# Patient Record
Sex: MALE | Race: WHITE | NOT HISPANIC OR LATINO | Employment: OTHER | URBAN - METROPOLITAN AREA
[De-identification: names, ages, dates, MRNs, and addresses within clinical notes are randomized per-mention and may not be internally consistent; named-entity substitution may affect disease eponyms.]

---

## 2018-08-02 ENCOUNTER — OFFICE VISIT (OUTPATIENT)
Dept: FAMILY MEDICINE | Facility: CLINIC | Age: 63
End: 2018-08-02
Payer: MEDICARE

## 2018-08-02 ENCOUNTER — TELEPHONE (OUTPATIENT)
Dept: FAMILY MEDICINE | Facility: CLINIC | Age: 63
End: 2018-08-02

## 2018-08-02 VITALS
OXYGEN SATURATION: 97 % | HEIGHT: 64 IN | DIASTOLIC BLOOD PRESSURE: 70 MMHG | WEIGHT: 134 LBS | TEMPERATURE: 98.6 F | BODY MASS INDEX: 22.88 KG/M2 | SYSTOLIC BLOOD PRESSURE: 156 MMHG | HEART RATE: 82 BPM

## 2018-08-02 DIAGNOSIS — E78.5 HYPERLIPIDEMIA, UNSPECIFIED HYPERLIPIDEMIA TYPE: ICD-10-CM

## 2018-08-02 DIAGNOSIS — I10 ESSENTIAL HYPERTENSION: ICD-10-CM

## 2018-08-02 DIAGNOSIS — Z79.4 TYPE 2 DIABETES MELLITUS WITH COMPLICATION, WITH LONG-TERM CURRENT USE OF INSULIN (H): Primary | ICD-10-CM

## 2018-08-02 DIAGNOSIS — M54.12 CERVICAL RADICULOPATHY: ICD-10-CM

## 2018-08-02 DIAGNOSIS — Z12.11 SCREEN FOR COLON CANCER: ICD-10-CM

## 2018-08-02 DIAGNOSIS — Z11.4 SCREENING FOR HIV (HUMAN IMMUNODEFICIENCY VIRUS): ICD-10-CM

## 2018-08-02 DIAGNOSIS — E11.8 TYPE 2 DIABETES MELLITUS WITH COMPLICATION, WITH LONG-TERM CURRENT USE OF INSULIN (H): Primary | ICD-10-CM

## 2018-08-02 DIAGNOSIS — S06.9X9D TRAUMATIC BRAIN INJURY WITH LOSS OF CONSCIOUSNESS, SUBSEQUENT ENCOUNTER: ICD-10-CM

## 2018-08-02 DIAGNOSIS — F10.21 ALCOHOLISM IN REMISSION (H): ICD-10-CM

## 2018-08-02 DIAGNOSIS — Z11.59 NEED FOR HEPATITIS C SCREENING TEST: ICD-10-CM

## 2018-08-02 DIAGNOSIS — Z59.00 HOMELESS: ICD-10-CM

## 2018-08-02 DIAGNOSIS — H91.93 BILATERAL HEARING LOSS, UNSPECIFIED HEARING LOSS TYPE: ICD-10-CM

## 2018-08-02 LAB
ALBUMIN SERPL-MCNC: 3.6 G/DL (ref 3.4–5)
ALP SERPL-CCNC: 61 U/L (ref 40–150)
ALT SERPL W P-5'-P-CCNC: 20 U/L (ref 0–70)
AMPHETAMINES UR QL: NOT DETECTED NG/ML
ANION GAP SERPL CALCULATED.3IONS-SCNC: 4 MMOL/L (ref 3–14)
AST SERPL W P-5'-P-CCNC: 18 U/L (ref 0–45)
BARBITURATES UR QL SCN: NOT DETECTED NG/ML
BENZODIAZ UR QL SCN: NOT DETECTED NG/ML
BILIRUB SERPL-MCNC: 0.2 MG/DL (ref 0.2–1.3)
BUN SERPL-MCNC: 15 MG/DL (ref 7–30)
BUPRENORPHINE UR QL: NOT DETECTED NG/ML
CALCIUM SERPL-MCNC: 9.2 MG/DL (ref 8.5–10.1)
CANNABINOIDS UR QL: NOT DETECTED NG/ML
CHLORIDE SERPL-SCNC: 106 MMOL/L (ref 94–109)
CHOLEST SERPL-MCNC: 263 MG/DL
CO2 SERPL-SCNC: 30 MMOL/L (ref 20–32)
COCAINE UR QL SCN: NOT DETECTED NG/ML
CREAT SERPL-MCNC: 1 MG/DL (ref 0.66–1.25)
CREAT UR-MCNC: 49 MG/DL
D-METHAMPHET UR QL: NOT DETECTED NG/ML
ERYTHROCYTE [DISTWIDTH] IN BLOOD BY AUTOMATED COUNT: 13.4 % (ref 10–15)
GFR SERPL CREATININE-BSD FRML MDRD: 75 ML/MIN/1.7M2
GLUCOSE SERPL-MCNC: 120 MG/DL (ref 70–99)
HBA1C MFR BLD: 6.6 % (ref 0–5.6)
HCT VFR BLD AUTO: 42.5 % (ref 40–53)
HCV AB SERPL QL IA: ABNORMAL
HDLC SERPL-MCNC: 53 MG/DL
HGB BLD-MCNC: 14.3 G/DL (ref 13.3–17.7)
HIV 1+2 AB+HIV1 P24 AG SERPL QL IA: NONREACTIVE
LDLC SERPL CALC-MCNC: 180 MG/DL
MCH RBC QN AUTO: 30.2 PG (ref 26.5–33)
MCHC RBC AUTO-ENTMCNC: 33.6 G/DL (ref 31.5–36.5)
MCV RBC AUTO: 90 FL (ref 78–100)
METHADONE UR QL SCN: NOT DETECTED NG/ML
MICROALBUMIN UR-MCNC: 5 MG/L
MICROALBUMIN/CREAT UR: 10.57 MG/G CR (ref 0–17)
NONHDLC SERPL-MCNC: 210 MG/DL
OPIATES UR QL SCN: NOT DETECTED NG/ML
OXYCODONE UR QL SCN: NOT DETECTED NG/ML
PCP UR QL SCN: NOT DETECTED NG/ML
PLATELET # BLD AUTO: 267 10E9/L (ref 150–450)
POTASSIUM SERPL-SCNC: 4.2 MMOL/L (ref 3.4–5.3)
PROPOXYPH UR QL: NOT DETECTED NG/ML
PROT SERPL-MCNC: 7.3 G/DL (ref 6.8–8.8)
RBC # BLD AUTO: 4.74 10E12/L (ref 4.4–5.9)
SODIUM SERPL-SCNC: 140 MMOL/L (ref 133–144)
TRICYCLICS UR QL SCN: NOT DETECTED NG/ML
TRIGL SERPL-MCNC: 150 MG/DL
WBC # BLD AUTO: 5.8 10E9/L (ref 4–11)

## 2018-08-02 PROCEDURE — 87389 HIV-1 AG W/HIV-1&-2 AB AG IA: CPT | Performed by: NURSE PRACTITIONER

## 2018-08-02 PROCEDURE — 99203 OFFICE O/P NEW LOW 30 MIN: CPT | Performed by: NURSE PRACTITIONER

## 2018-08-02 PROCEDURE — 36415 COLL VENOUS BLD VENIPUNCTURE: CPT | Performed by: NURSE PRACTITIONER

## 2018-08-02 PROCEDURE — 80306 DRUG TEST PRSMV INSTRMNT: CPT | Performed by: NURSE PRACTITIONER

## 2018-08-02 PROCEDURE — 80053 COMPREHEN METABOLIC PANEL: CPT | Performed by: NURSE PRACTITIONER

## 2018-08-02 PROCEDURE — 85027 COMPLETE CBC AUTOMATED: CPT | Performed by: NURSE PRACTITIONER

## 2018-08-02 PROCEDURE — 82043 UR ALBUMIN QUANTITATIVE: CPT | Performed by: NURSE PRACTITIONER

## 2018-08-02 PROCEDURE — 80061 LIPID PANEL: CPT | Performed by: NURSE PRACTITIONER

## 2018-08-02 PROCEDURE — 86803 HEPATITIS C AB TEST: CPT | Performed by: NURSE PRACTITIONER

## 2018-08-02 PROCEDURE — 83036 HEMOGLOBIN GLYCOSYLATED A1C: CPT | Performed by: NURSE PRACTITIONER

## 2018-08-02 RX ORDER — METHYLPHENIDATE HYDROCHLORIDE 10 MG/1
10 TABLET ORAL
COMMUNITY
Start: 2018-07-06

## 2018-08-02 RX ORDER — AMLODIPINE BESYLATE 10 MG/1
10 TABLET ORAL
COMMUNITY
Start: 2012-06-26 | End: 2018-08-02

## 2018-08-02 RX ORDER — ASPIRIN 81 MG/1
81 TABLET, CHEWABLE ORAL DAILY
Qty: 36 TABLET | Refills: 0 | Status: SHIPPED | OUTPATIENT
Start: 2018-08-02 | End: 2018-08-02

## 2018-08-02 RX ORDER — AMLODIPINE BESYLATE 10 MG/1
10 TABLET ORAL DAILY
Qty: 30 TABLET | Refills: 1 | Status: SHIPPED | OUTPATIENT
Start: 2018-08-02 | End: 2020-09-25

## 2018-08-02 RX ORDER — GABAPENTIN 100 MG/1
CAPSULE ORAL
Qty: 180 CAPSULE | Refills: 0 | Status: SHIPPED | OUTPATIENT
Start: 2018-08-02 | End: 2018-08-02

## 2018-08-02 RX ORDER — GABAPENTIN 300 MG/1
CAPSULE ORAL
COMMUNITY
Start: 2018-05-03 | End: 2019-02-28

## 2018-08-02 RX ORDER — GABAPENTIN 100 MG/1
CAPSULE ORAL
Qty: 180 CAPSULE | Refills: 0 | Status: SHIPPED | OUTPATIENT
Start: 2018-08-02 | End: 2019-02-28 | Stop reason: DRUGHIGH

## 2018-08-02 RX ORDER — ASPIRIN 81 MG/1
81 TABLET, CHEWABLE ORAL
COMMUNITY
Start: 2017-08-17 | End: 2018-08-02

## 2018-08-02 RX ORDER — ESZOPICLONE 2 MG/1
2 TABLET, FILM COATED ORAL
COMMUNITY
Start: 2018-04-27 | End: 2019-02-26

## 2018-08-02 RX ORDER — ASPIRIN 81 MG/1
81 TABLET, CHEWABLE ORAL DAILY
Qty: 36 TABLET | Refills: 0 | Status: SHIPPED | OUTPATIENT
Start: 2018-08-02 | End: 2018-08-29

## 2018-08-02 RX ORDER — AMLODIPINE BESYLATE 10 MG/1
10 TABLET ORAL DAILY
Qty: 30 TABLET | Refills: 1 | Status: SHIPPED | OUTPATIENT
Start: 2018-08-02 | End: 2018-08-02

## 2018-08-02 SDOH — ECONOMIC STABILITY - HOUSING INSECURITY: HOMELESSNESS UNSPECIFIED: Z59.00

## 2018-08-02 ASSESSMENT — PAIN SCALES - GENERAL: PAINLEVEL: NO PAIN (0)

## 2018-08-02 NOTE — MR AVS SNAPSHOT
After Visit Summary   8/2/2018    Faheem Jeff    MRN: [id number]           Patient Information     Date Of Birth          1955        Visit Information        Provider Department      8/2/2018 8:40 AM Lauren Cole APRN CNP [facility]        Today's Diagnoses     Type 2 diabetes mellitus with complication, with long-term current use of insulin (H)    -  1    Alcoholism in remission (H)        Hyperlipidemia, unspecified hyperlipidemia type        Cervical radiculopathy        Essential hypertension        Traumatic brain injury with loss of consciousness, subsequent encounter        Bilateral hearing loss, unspecified hearing loss type        Homeless        Need for hepatitis C screening test        Screening for HIV (human immunodeficiency virus)        Screen for colon cancer          Care Instructions    At [facility], we strive to deliver an exceptional experience to you, every time we see you.  If you receive a survey in the mail, please send us back your thoughts. We really do value your feedback.    Based on your medical history, these are the current health maintenance/preventive care services that you are due for (some may have been done at this visit.)  Health Maintenance Due   Topic Date Due     PHQ-2 Q1 YR  05/24/1965     TETANUS IMMUNIZATION (SYSTEM ASSIGNED)  05/24/1971     HIV SCREEN (SYSTEM ASSIGNED)  05/24/1971     HEPATITIS C SCREENING  05/24/1971     LIPID SCREEN Q5 YR MALE (SYSTEM ASSIGNED)  05/24/1988     COLON CANCER SCREEN (SYSTEM ASSIGNED)  05/24/2003     ADVANCE DIRECTIVE PLANNING Q5 YRS  05/24/2008     FALL RISK ASSESSMENT  05/24/2018     PNEUMOCOCCAL (1 of 2 - PCV13) 05/24/2018     AORTIC ANEURYSM SCREENING (SYSTEM ASSIGNED)  05/24/2018       Suggested websites for health information:  Www.[employer].org : Up to date and easily searchable information on multiple topics.  Www.medlineplus.gov : medication info, interactive  tutorials, watch real surgeries online  Www.familydoctor.org : good info from the Academy of Family Physicians  Www.cdc.gov : public health info, travel advisories, epidemics (H1N1)  Www.aap.org : children's health info, normal development, vaccinations  Www.health.state[place].us : [place] dept of health, public health issues in [place], N1N1    Your care team:                            Family Medicine Internal Medicine   MD Ken Stern MD Shantel Branch-Fleming, MD Katya Georgiev PA-C Megan Hill, APRN CNP    Anish Rod MD Pediatrics   CHRISTOS Price, MD Helena Bermeo APRN CNP   MD Agueda Centeno MD Deborah Mielke, MD Kim Thein, APRN CNP      Clinic hours: Monday - Thursday 7 am-7 pm; Fridays 7 am-5 pm.   Urgent care: Monday - Friday 11 am-9 pm; Saturday and Sunday 9 am-5 pm.  Pharmacy : Monday -Thursday 8 am-8 pm; Friday 8 am-6 pm; Saturday and Sunday 9 am-5 pm.     Clinic: [phone]   Pharmacy: [phone]              Follow-ups after your visit        Additional Services     AUDIOLOGY ADULT REFERRAL       Your provider has referred you to: [facility]: [facility] [phone]   [email or web address]    Specialty Testing:  Audiogram w/Tymps and Reflexes (Comprehensive Audiology Evaluation)                  Follow-up notes from your care team     Return if symptoms worsen or fail to improve, for follow with PCP and specialists at [facility].      Future tests that were ordered for you today     Open Future Orders        Priority Expected Expires Ordered    Fecal colorectal cancer screen (FIT) Routine 8/23/2018 10/25/2018 8/2/2018            Who to contact     If you have questions or need follow up information about today's clinic visit or your schedule please contact [facility] directly at [phone].  Normal or non-critical lab and imaging results will be  "communicated to you by [employer], letter or phone within 4 business days after the clinic has received the results. If you do not hear from us within 7 days, please contact the clinic through [employer] or phone. If you have a critical or abnormal lab result, we will notify you by phone as soon as possible.  Submit refill requests through [employer] or call your pharmacy and they will forward the refill request to us. Please allow 3 business days for your refill to be completed.          Additional Information About Your Visit        [employer] Information     [employer] lets you send messages to your doctor, view your test results, renew your prescriptions, schedule appointments and more. To sign up, go to [email or web address] . Click on \"Log in\" on the left side of the screen, which will take you to the Welcome page. Then click on \"Sign up Now\" on the right side of the page.     You will be asked to enter the access code listed below, as well as some personal information. Please follow the directions to create your username and password.     Your access code is: [email or web address]  Expires: 10/31/2018  5:42 PM     Your access code will  in 90 days. If you need help or a new code, please call your [place] clinic or [phone].        Care EveryWhere ID     This is your Care EveryWhere ID. This could be used by other organizations to access your [place] medical records  [id number]        Your Vitals Were     Pulse Temperature Height Pulse Oximetry BMI (Body Mass Index)       82 98.6  F (37  C) (Oral) 5' 4\" (1.626 m) 97% 23 kg/m2        Blood Pressure from Last 3 Encounters:   18 156/70    Weight from Last 3 Encounters:   18 134 lb (60.8 kg)              We Performed the Following     Albumin Random Urine Quantitative with Creat Ratio     AUDIOLOGY ADULT REFERRAL     CBC with platelets     Comprehensive metabolic panel     Hemoglobin A1c     Hepatitis C Screen Reflex to HCV RNA Quant and Genotype     HIV " Screening     Lipid panel reflex to direct LDL Fasting     Urine Drugs of Abuse Screen Panel 13          Today's Medication Changes          These changes are accurate as of 8/2/18  5:42 PM.  If you have any questions, ask your nurse or doctor.               Start taking these medicines.        Dose/Directions    amLODIPine 10 MG tablet   Commonly known as:  NORVASC   Used for:  Essential hypertension   Started by:  Lauren Cole APRN CNP        Dose:  10 mg   Take 1 tablet (10 mg) by mouth daily   Quantity:  30 tablet   Refills:  1       aspirin 81 MG chewable tablet   Used for:  Essential hypertension   Started by:  Lauren Cole APRN CNP        Dose:  81 mg   Take 1 tablet (81 mg) by mouth daily   Quantity:  36 tablet   Refills:  0       metFORMIN 1000 MG tablet   Commonly known as:  GLUCOPHAGE   Used for:  Type 2 diabetes mellitus with complication, with long-term current use of insulin (H)   Started by:  Lauren Cole APRN CNP        Dose:  1000 mg   Take 1 tablet (1,000 mg) by mouth 2 times daily (with meals)   Quantity:  60 tablet   Refills:  1       order for DME   Used for:  Type 2 diabetes mellitus with complication, with long-term current use of insulin (H)   Started by:  Lauren Cole APRN CNP        Equipment being ordered: Glucometer per insurance preference, lancets, test strips.  Patient to check sugars 4 times daily, 90 day supply for test strips and lancets, refill 3 times   Quantity:  1 Device   Refills:  0         These medicines have changed or have updated prescriptions.        Dose/Directions    * gabapentin 300 MG capsule   Commonly known as:  NEURONTIN   This may have changed:  Another medication with the same name was added. Make sure you understand how and when to take each.   Changed by:  Lauren Cole APRN CNP        TAKE 3 CAPSULES BY MOUTH AT BEDTIME.   Refills:  0       * gabapentin 100 MG capsule   Commonly known as:  NEURONTIN   This may have changed:  You  were already taking a medication with the same name, and this prescription was added. Make sure you understand how and when to take each.   Used for:  Cervical radiculopathy   Changed by:  Lauren Cole APRN CNP        Take one tab po tid and increase to 2 tabs tid for 1 week, then increase to 3 tabs tid   Quantity:  180 capsule   Refills:  0       * Notice:  This list has 2 medication(s) that are the same as other medications prescribed for you. Read the directions carefully, and ask your doctor or other care provider to review them with you.      Stop taking these medicines if you haven't already. Please contact your care team if you have questions.     HYDROcodone-acetaminophen 5-500 MG per tablet   Commonly known as:  VICODIN   Stopped by:  Lauren Cole APRN CNP           VICODIN 5-500 MG per tablet   Generic drug:  HYDROcodone-acetaminophen   Stopped by:  Lauren Cole APRN CNP                Where to get your medicines      These medications were sent to [facility] [id number] [facility] [address] [place] [address] AT [facility] [address]  [address] [facility] [address]     Phone:  [phone]     amLODIPine 10 MG tablet    aspirin 81 MG chewable tablet    gabapentin 100 MG capsule    metFORMIN 1000 MG tablet         Some of these will need a paper prescription and others can be bought over the counter.  Ask your nurse if you have questions.     Bring a paper prescription for each of these medications     order for DME                Primary Care Provider Fax #    Physician No Ref-Primary [phone]       No address on file        Equal Access to Services     DARON MCGUIRE AH: Hadii chidi velasquezo Someenu, waaxda luqadaha, qaybta kaalmada adeegyada, chinmay pinedo. So [facility] [phone].    ATENCIÓN: Si habla español, tiene a martinez disposición servicios gratuitos de asistencia lingüística. Llame al [phone].    We comply with applicable federal  civil rights laws and [place] laws. We do not discriminate on the basis of race, color, national origin, age, disability, sex, sexual orientation, or gender identity.            Thank you!     Thank you for choosing [facility]  for your care. Our goal is always to provide you with excellent care. Hearing back from our patients is one way we can continue to improve our services. Please take a few minutes to complete the written survey that you may receive in the mail after your visit with us. Thank you!             Your Updated Medication List - Protect others around you: Learn how to safely use, store and throw away your medicines at www.disposemymeds.org.          This list is accurate as of 8/2/18  5:42 PM.  Always use your most recent med list.                   Brand Name Dispense Instructions for use Diagnosis    * ** PATIENT ALERT **     0    Warning:  All refills must be approved by MD.        * ** PATIENT ALERT **     0    Warning:  All refills must be approved by MD.        amLODIPine 10 MG tablet    NORVASC    30 tablet    Take 1 tablet (10 mg) by mouth daily    Essential hypertension       aspirin 81 MG chewable tablet     36 tablet    Take 1 tablet (81 mg) by mouth daily    Essential hypertension       eszopiclone 2 MG tablet    LUNESTA     Take 2 mg by mouth        * gabapentin 300 MG capsule    NEURONTIN     TAKE 3 CAPSULES BY MOUTH AT BEDTIME.        * gabapentin 100 MG capsule    NEURONTIN    180 capsule    Take one tab po tid and increase to 2 tabs tid for 1 week, then increase to 3 tabs tid    Cervical radiculopathy       metFORMIN 1000 MG tablet    GLUCOPHAGE    60 tablet    Take 1 tablet (1,000 mg) by mouth 2 times daily (with meals)    Type 2 diabetes mellitus with complication, with long-term current use of insulin (H)       methylphenidate 10 MG tablet    RITALIN     Take 10 mg by mouth        order for DME     1 Device    Equipment being ordered: Glucometer per insurance  preference, lancets, test strips.  Patient to check sugars 4 times daily, 90 day supply for test strips and lancets, refill 3 times    Type 2 diabetes mellitus with complication, with long-term current use of insulin (H)       * Notice:  This list has 4 medication(s) that are the same as other medications prescribed for you. Read the directions carefully, and ask your doctor or other care provider to review them with you.

## 2018-08-02 NOTE — PATIENT INSTRUCTIONS
At [facility], we strive to deliver an exceptional experience to you, every time we see you.  If you receive a survey in the mail, please send us back your thoughts. We really do value your feedback.    Based on your medical history, these are the current health maintenance/preventive care services that you are due for (some may have been done at this visit.)  Health Maintenance Due   Topic Date Due     PHQ-2 Q1 YR  05/24/1967     TETANUS IMMUNIZATION (SYSTEM ASSIGNED)  05/24/1971     HIV SCREEN (SYSTEM ASSIGNED)  05/24/1971     HEPATITIS C SCREENING  05/24/1971     LIPID SCREEN Q5 YR MALE (SYSTEM ASSIGNED)  05/24/1988     COLON CANCER SCREEN (SYSTEM ASSIGNED)  05/24/2003     ADVANCE DIRECTIVE PLANNING Q5 YRS  05/24/2008     FALL RISK ASSESSMENT  05/24/2018     PNEUMOCOCCAL (1 of 2 - PCV13) 05/24/2018     AORTIC ANEURYSM SCREENING (SYSTEM ASSIGNED)  05/24/2018       Suggested websites for health information:  Www.[facility].com."[employer] : Up to date and easily searchable information on multiple topics.  Www.medlineplus.gov : medication info, interactive tutorials, watch real surgeries online  Www.familydoctor.org : good info from the Academy of Family Physicians  Www.cdc.gov : public health info, travel advisories, epidemics (H1N1)  Www.aap.org : children's health info, normal development, vaccinations  Www.health.[facility].[place].us : [place] dept of health, public health issues in [place], N1N1    Your care team:                            Family Medicine Internal Medicine   MD Ken Stern MD Shantel Branch-Fleming, MD Katya Georgiev PA-C Megan Hill, APRN CNP Nam Ho, MD Pediatrics   CHRISTOS Price, MD Helena Bermeo APRN CNP   MD Agueda Centeno MD Deborah Mielke, MD Kim Thein, APRN [facility]      Clinic hours: Monday - Thursday 7 am-7 pm; Fridays 7 am-5 pm.   Urgent care: Monday - Friday 11 am-9 pm; Saturday and Sunday 9 am-5 pm.  Pharmacy  : Monday -Thursday 8 am-8 pm; Friday 8 am-6 pm; Saturday and Sunday 9 am-5 pm.     Clinic: [phone]   Pharmacy: [phone]
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
## 2018-08-02 NOTE — TELEPHONE ENCOUNTER
Spoke to patient because he forgot to take the prescription from provider for glucometer, I asked him what pharmacy he wanted us to fax it to or he can pick it up, he stated he had to ask his friend and they would give us a call back.  Brook Martínez MA

## 2018-08-03 ENCOUNTER — PATIENT OUTREACH (OUTPATIENT)
Dept: CARE COORDINATION | Facility: CLINIC | Age: 63
End: 2018-08-03

## 2018-08-03 DIAGNOSIS — I10 ESSENTIAL HYPERTENSION: ICD-10-CM

## 2018-08-03 DIAGNOSIS — S06.9XAA TRAUMATIC BRAIN INJURY (H): ICD-10-CM

## 2018-08-03 DIAGNOSIS — F41.8 DEPRESSION WITH ANXIETY: ICD-10-CM

## 2018-08-03 DIAGNOSIS — F10.21 ALCOHOLISM IN REMISSION (H): Primary | ICD-10-CM

## 2018-08-03 DIAGNOSIS — G47.01 INSOMNIA DUE TO MEDICAL CONDITION: ICD-10-CM

## 2018-08-03 DIAGNOSIS — F11.20 CHRONIC NARCOTIC DEPENDENCE (H): ICD-10-CM

## 2018-08-03 DIAGNOSIS — G47.33 OSA (OBSTRUCTIVE SLEEP APNEA): ICD-10-CM

## 2018-08-03 DIAGNOSIS — G47.12 IDIOPATHIC HYPERSOMNIA WITHOUT LONG SLEEP TIME: ICD-10-CM

## 2018-08-03 DIAGNOSIS — F14.20 COCAINE DEPENDENCE, EPISODIC (H): ICD-10-CM

## 2018-08-03 DIAGNOSIS — G89.4 CHRONIC PAIN DISORDER: ICD-10-CM

## 2018-08-03 DIAGNOSIS — E11.9 DIABETES MELLITUS, TYPE II (H): ICD-10-CM

## 2018-08-03 DIAGNOSIS — Z59.00 HOMELESS: ICD-10-CM

## 2018-08-03 SDOH — ECONOMIC STABILITY - HOUSING INSECURITY: HOMELESSNESS UNSPECIFIED: Z59.00

## 2018-08-03 ASSESSMENT — ANXIETY QUESTIONNAIRES
3. WORRYING TOO MUCH ABOUT DIFFERENT THINGS: MORE THAN HALF THE DAYS
GAD7 TOTAL SCORE: 12
IF YOU CHECKED OFF ANY PROBLEMS ON THIS QUESTIONNAIRE, HOW DIFFICULT HAVE THESE PROBLEMS MADE IT FOR YOU TO DO YOUR WORK, TAKE CARE OF THINGS AT HOME, OR GET ALONG WITH OTHER PEOPLE: VERY DIFFICULT
7. FEELING AFRAID AS IF SOMETHING AWFUL MIGHT HAPPEN: MORE THAN HALF THE DAYS
1. FEELING NERVOUS, ANXIOUS, OR ON EDGE: MORE THAN HALF THE DAYS
6. BECOMING EASILY ANNOYED OR IRRITABLE: MORE THAN HALF THE DAYS
2. NOT BEING ABLE TO STOP OR CONTROL WORRYING: SEVERAL DAYS
5. BEING SO RESTLESS THAT IT IS HARD TO SIT STILL: SEVERAL DAYS

## 2018-08-03 ASSESSMENT — PATIENT HEALTH QUESTIONNAIRE - PHQ9: 5. POOR APPETITE OR OVEREATING: MORE THAN HALF THE DAYS

## 2018-08-03 NOTE — TELEPHONE ENCOUNTER
Called and spoke to patient and patient states he would like this sent to [facility]. I confirmed with him that his other Rxs were E-Scribed there yesterday. Faxed Rx DME for the glucometer, strips and lancets to [facility], [phone], right fax confirmed at 11:09 am today.  Nisreen Crespo MA/  For [employer]
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
## 2018-08-03 NOTE — TELEPHONE ENCOUNTER
"Requested Prescriptions   Pending Prescriptions Disp Refills     amLODIPine (NORVASC) 10 MG tablet [Pharmacy Med Name: AMLODIPINE BESYLATE 10MG TABLETS] 90 tablet 1    Last Written Prescription Date:  8/2/18  Last Fill Quantity: 30,  # refills: 1   Last Office Visit with [facility], [facility] or [facility] prescribing provider:  8/2/2018     Future Office Visit:      Sig: TAKE 1 TABLET(10 MG) BY MOUTH DAILY    Calcium Channel Blockers Protocol  Failed    8/2/2018  5:42 PM       Failed - Blood pressure under 140/90 in past 12 months    BP Readings from Last 3 Encounters:   08/02/18 156/70                Passed - Recent (12 mo) or future (30 days) visit within the authorizing provider's specialty    Patient had office visit in the last 12 months or has a visit in the next 30 days with authorizing provider or within the authorizing provider's specialty.  See \"Patient Info\" tab in inbasket, or \"Choose Columns\" in Meds & Orders section of the refill encounter.           Passed - Patient is age 18 or older       Passed - Normal serum creatinine on file in past 12 months    Recent Labs   Lab Test  08/02/18   0954   CR  1.00               "

## 2018-08-03 NOTE — TELEPHONE ENCOUNTER
I have the Rx DME for the Glucometer,lancets and test strips. Called and left message for patient to return our call to let us know if he would like this brought to our Pharmacy or left at the .  Nisreen Crespo MA/  For [employer]

## 2018-08-04 ASSESSMENT — ANXIETY QUESTIONNAIRES: GAD7 TOTAL SCORE: 12

## 2018-08-04 ASSESSMENT — PATIENT HEALTH QUESTIONNAIRE - PHQ9: SUM OF ALL RESPONSES TO PHQ QUESTIONS 1-9: 9

## 2018-08-06 RX ORDER — AMLODIPINE BESYLATE 10 MG/1
TABLET ORAL
Qty: 90 TABLET | Refills: 1 | OUTPATIENT
Start: 2018-08-06

## 2018-08-14 ENCOUNTER — PATIENT OUTREACH (OUTPATIENT)
Dept: CARE COORDINATION | Facility: CLINIC | Age: 63
End: 2018-08-14

## 2018-08-14 NOTE — PROGRESS NOTES
Clinic Care Coordination Contact  Care Team Conversations    Received return call from pt.  He will continue to see Dr. Smith at the [facility] in [place].  Aware he is getting a new Case Manger and continues to work with his ILS worker.     Plan :  No further CC outreach planned as pt will be following with another health care system     Bailey Martínez [facility]  , Clinic Care Coordination  Clinics:  [facility] [place], [place]  [phone]   8/14/2018   12:51 PM

## 2018-08-14 NOTE — PROGRESS NOTES
Clinic Care Coordination Contact  [facility]/Voicemail- Social Work     Referral Source: PCP  Clinical Data: Care Coordinator Outreach  Outreach attempted x  As a follow up to last conversation  Left message on voicemail with call back information and requested return call.  Plan If no response, :Care Coordinator will try to reach patient again in 3-weeks. If not contact then , CC will plan to close as pt. indicated he renee follow at [facility]  , Clinic Care Coordination  Clinics:  [facility], [place]  [phone]   8/14/2018   9:51 AM

## 2018-08-29 DIAGNOSIS — I10 ESSENTIAL HYPERTENSION: ICD-10-CM

## 2018-08-29 DIAGNOSIS — M54.12 CERVICAL RADICULOPATHY: ICD-10-CM

## 2018-08-29 NOTE — TELEPHONE ENCOUNTER
"Requested Prescriptions   Pending Prescriptions Disp Refills     aspirin 81 MG chewable tablet [Pharmacy Med Name: ASPIRIN 81MG CHEWABLE TABLETS]  Last Written Prescription Date:  08/02/18  Last Fill Quantity: 36,  # refills: 0   Last Office Visit with [facility], [facility] or [facility] prescribing provider:  08/02/18-thein   Future Office Visit:    36 tablet 0     Sig: CHEW AND SWALLOW 1 TABLET(81 MG) BY MOUTH DAILY    Analgesics (Non-Narcotic Tylenol and ASA Only) Passed    8/29/2018  2:38 PM       Passed - Recent (12 mo) or future (30 days) visit within the authorizing provider's specialty    Patient had office visit in the last 12 months or has a visit in the next 30 days with authorizing provider or within the authorizing provider's specialty.  See \"Patient Info\" tab in inbasket, or \"Choose Columns\" in Meds & Orders section of the refill encounter.           Passed - Patient is age 20 years or older    If ASA is flagged for ages under 20 years old. Forward to provider for confirmation Ryes Syndrome is not a concern.              gabapentin (NEURONTIN) 100 MG capsule [Pharmacy Med Name: GABAPENTIN 100MG CAPSULES]  Last Written Prescription Date:  08/02/18  Last Fill Quantity: 180,  # refills: 0   Last Office Visit with [facility], [facility] or [facility] prescribing provider:  08/02/18-Thein   Future Office Visit:    180 capsule 0     Sig: TAKE 1 CAPSULE BY MOUTH THREE TIMES DAILY. INCREASE TO 2 TABLET 3 TIMES DAILY FOR 1 WEEK, THEN. INCREASE TO 3 TABLET 3 TIMES DAILY    There is no refill protocol information for this order          "

## 2018-08-30 RX ORDER — ASPIRIN 81 MG/1
TABLET, CHEWABLE ORAL
Qty: 36 TABLET | Refills: 0 | Status: SHIPPED | OUTPATIENT
Start: 2018-08-30

## 2018-08-30 NOTE — TELEPHONE ENCOUNTER
For gabapentin:  Routing refill request to provider for review/approval because:  Drug not on the [facility] refill protocol     For aspirin:  Prescription approved per [facility] Refill Protocol.      Yvon Nguyen RN, BSN

## 2018-09-10 RX ORDER — GABAPENTIN 300 MG/1
300 CAPSULE ORAL 3 TIMES DAILY
Qty: 90 CAPSULE | Refills: 1 | Status: SHIPPED | OUTPATIENT
Start: 2018-09-10 | End: 2020-01-08

## 2018-09-10 RX ORDER — GABAPENTIN 100 MG/1
CAPSULE ORAL
Qty: 180 CAPSULE | Refills: 0 | OUTPATIENT
Start: 2018-09-10

## 2018-09-10 NOTE — TELEPHONE ENCOUNTER
I refilled his medication but encourage him to re establish care with [facility] where he is well known to them and  Established with specialty providers.  Lauren HERNANDEZ, CNP

## 2019-01-11 ENCOUNTER — TRANSFERRED RECORDS (OUTPATIENT)
Dept: HEALTH INFORMATION MANAGEMENT | Facility: CLINIC | Age: 64
End: 2019-01-11

## 2019-02-07 ENCOUNTER — TRANSFERRED RECORDS (OUTPATIENT)
Dept: HEALTH INFORMATION MANAGEMENT | Facility: CLINIC | Age: 64
End: 2019-02-07

## 2019-02-19 ENCOUNTER — TELEPHONE (OUTPATIENT)
Dept: FAMILY MEDICINE | Facility: CLINIC | Age: 64
End: 2019-02-19

## 2019-02-19 NOTE — TELEPHONE ENCOUNTER
Reason for call:  Other   Patient called regarding (reason for call): appointment  Additional comments: AURELIO. Patient is looking to establish care with Dr. Correa. He has an appointment on 02/25/19. He has a brain injury so he has trouble remembering things. This note is for Dr. Correa and in case patient forgets to mention these at his visit. His main concern is to make sure he can talk about the pain he is experiencing in his legs. They are continually painful. He would like to obtain a referral to an orthopedics specialist or pain clinic.     Deepali PARADA  Central Scheduler

## 2019-02-26 ENCOUNTER — APPOINTMENT (OUTPATIENT)
Dept: GENERAL RADIOLOGY | Facility: CLINIC | Age: 64
End: 2019-02-26
Attending: EMERGENCY MEDICINE
Payer: MEDICARE

## 2019-02-26 ENCOUNTER — HOSPITAL ENCOUNTER (EMERGENCY)
Facility: CLINIC | Age: 64
Discharge: HOME | End: 2019-02-26
Attending: EMERGENCY MEDICINE | Admitting: EMERGENCY MEDICINE
Payer: MEDICARE

## 2019-02-26 VITALS
OXYGEN SATURATION: 98 % | SYSTOLIC BLOOD PRESSURE: 153 MMHG | HEART RATE: 75 BPM | TEMPERATURE: 98 F | RESPIRATION RATE: 16 BRPM | BODY MASS INDEX: 23.05 KG/M2 | DIASTOLIC BLOOD PRESSURE: 86 MMHG | WEIGHT: 135 LBS | HEIGHT: 64 IN

## 2019-02-26 DIAGNOSIS — M25.552 HIP PAIN, LEFT: ICD-10-CM

## 2019-02-26 PROCEDURE — 99283 EMERGENCY DEPT VISIT LOW MDM: CPT

## 2019-02-26 PROCEDURE — 73502 X-RAY EXAM HIP UNI 2-3 VIEWS: CPT

## 2019-02-26 RX ORDER — METOPROLOL TARTRATE 25 MG/1
25 TABLET, FILM COATED ORAL 2 TIMES DAILY
COMMUNITY
End: 2019-12-05

## 2019-02-26 RX ORDER — LIDOCAINE 50 MG/G
2 PATCH TOPICAL EVERY 24 HOURS
Qty: 30 PATCH | Refills: 0 | Status: SHIPPED | OUTPATIENT
Start: 2019-02-26 | End: 2019-02-28

## 2019-02-26 ASSESSMENT — MIFFLIN-ST. JEOR: SCORE: 1308.36

## 2019-02-26 ASSESSMENT — ENCOUNTER SYMPTOMS
FLANK PAIN: 0
SHORTNESS OF BREATH: 0
BACK PAIN: 1
ARTHRALGIAS: 1
ABDOMINAL PAIN: 0

## 2019-02-26 NOTE — ED AVS SNAPSHOT
Emergency Department  [address] [place] [address]  Phone:  [phone]  Fax:  [phone]                                    Faheem Jeff   MRN: [id number]    Department:   Emergency Department   Date of Visit:  2/26/2019           After Visit Summary Signature Page    I have received my discharge instructions, and my questions have been answered. I have discussed any challenges I see with this plan with the nurse or doctor.    ..........................................................................................................................................  Patient/Patient Representative Signature      ..........................................................................................................................................  Patient Representative Print Name and Relationship to Patient    ..................................................               ................................................  Date                                   Time    ..........................................................................................................................................  Reviewed by Signature/Title    ...................................................              ..............................................  Date                                               Time          22EPIC Rev 08/18

## 2019-02-26 NOTE — ED PROVIDER NOTES
"  History     Chief Complaint:  Back Pain and Lower Leg Pain    HPI   Faheem Jeff is a 63 year old male who presents with back pain and lower leg pain, radiating from his left hip. The patient states that he has an appointment to get a primary physician in 2 days, however his pain is so severe and over the counter ibuprofen gives him diarrhea. The patient has been visiting multiple care facilities but has not received the care he has wants, so he visits the emergency department today to be further evaluated and to control his pain. The patient states that in January of 2019, he fell on ice on his porch, resulting in a minimally displaced left greater trochanter, contributing to his chronic pain. The patient denies any other associated symptoms. The patient does state he has slipped a few times since his last fall.    Allergies:  Meclizine  Trazodone derivatives    Medications:    Norvasc  Aspirin  Neurontin  Glucophage  Ritalin   Amlodipine  Metoprolol    Past Medical History:    Displacement of cervical intervertebral discs without myelopathy  Osteoarthritis  Drug Abuse    Past Surgical History:    History reviewed. No pertinent past surgical history.    Family History:    The patient denies any relevant family medical history.    Social History:  Marital Status: Single  Smoking Status: Every day  Alcohol Use: No  Drug Use: No    Review of Systems   Respiratory: Negative for shortness of breath.    Cardiovascular: Negative for chest pain.   Gastrointestinal: Negative for abdominal pain.   Genitourinary: Negative for flank pain.   Musculoskeletal: Positive for arthralgias and back pain.   All other systems reviewed and are negative.    Physical Exam     Patient Vitals for the past 24 hrs:   BP Temp Temp src Pulse Heart Rate Resp SpO2 Height Weight   02/26/19 1853 -- -- -- 75 75 16 98 % -- --   02/26/19 1637 153/86 98  F (36.7  C) Oral -- 77 16 97 % 1.626 m (5' 4\") 61.2 kg (135 lb)       Physical Exam  Nursing note " and vitals reviewed.  Constitutional:  Appears well-developed and well-nourished.   HENT:   Head:    Atraumatic.   Mouth/Throat:   Oropharynx is clear and moist. No oropharyngeal exudate.   Eyes:    Pupils are equal, round, and reactive to light.   Neck:    Normal range of motion. Neck supple.      No tracheal deviation present. No thyromegaly present.   Cardiovascular:  Normal rate, regular rhythm, no murmur   Pulmonary/Chest: Breath sounds are clear and equal without wheezes or crackles.  Abdominal:   Soft. Bowel sounds are normal. Exhibits no distension and      no mass. There is no tenderness.      There is no rebound and no guarding.   Musculoskeletal:  Exhibits no edema. Mild tenderness over the left greater trochanter    of the hip.  Lymphadenopathy:  No cervical adenopathy.   Neurological:   Alert and oriented to person, place, and time.  GCS 15.  CN 2-12 intact.  and proximal upper extremity strength strong and equal.  Bilateral lower extremity strength strong and equal, including strong dorsiflexion and plantarflexion strength.  Sensation intact and equal to the face, arms and legs.  No facial droop or weakness. Normal speech.  Follows commands and answers questions normally.     Skin:    Skin is warm and dry. No rash noted. No pallor.     Emergency Department Course     Imaging:  Radiographic findings were communicated with the patient who voiced understanding of the findings.    XR Pelvis w Hip Left 1 View:  Minimal cortical irregularity of the greater trochanter on  the left may be related to a healing fracture. No definite new  fracture or dislocation demonstrated.  Per Radiologist.     Emergency Department Course:  The patient arrived in the emergency department via EMS.  Past medical records, nursing notes, and vitals reviewed.  1639: I performed an exam of the patient and obtained history, as documented above.    The patient was taken for a pelvis with hip XR, see imaging results above.     I  rechecked the patient. Findings and plan explained to the Patient. Patient discharged home with instructions regarding supportive care, medications, and reasons to return. The importance of close follow-up was reviewed.     Impression & Plan      Medical Decision Making:  Faheem Jeff is a 63 year old male who presents to the ED for evaluation of ongoing left hip pain since he had fallen and had a mildly displaced left greater trochanter fracture. He has been going to different emergency departments and clinics requesting pain medication and I did not feel comfortable prescribing  Narcotic pain medications for him today especially since he has a pain clinic appointment tomorrow to establish care. I performed new XR's on him which did not show any sign of new fracture and it shows  Cortical irregularity which would be consistent with his healing old fracture and he is ambulating well here. I wrote him a prescription for lidocaine patches and he was told to continue the gabapentin. He has been having  Some diarrhea since this morning but does not appear dehydrated so I did not feel any testing was indicated. He states that this is likely due to ibuprofen which he is going to stop. He does not have any abdomin al pain or tenderness to be concerned about an ulcer or perforated ulcer so I feel he is safe to be discharged home.   Critical Care time:  none    Diagnosis:    ICD-10-CM   1. Hip pain, left M25.552       Disposition:  discharged to home    Discharge Medications:     Medication List      Started    lidocaine 5 % patch  Commonly known as:  LIDODERM  2 patches, Transdermal, EVERY 24 HOURS              Arnie Mcgill  2/26/2019    EMERGENCY DEPARTMENT    I, Arnie Mcgill, am serving as a scribe at 4:39 PM on 2/26/2019 to document services personally performed by Emily Jefferson MD based on my observations and the provider's statements to me.          Emily Jefferson MD  02/26/19 8509

## 2019-02-26 NOTE — ED NOTES
Bed: ED01  Expected date:   Expected time: 4:25 PM  Means of arrival:   Comments:  [address] 63m leg and back pain eta 20 min

## 2019-02-27 NOTE — ED NOTES
Patient was observed in triage lobby calling [facility] services for a Healthride. After getting off the phone, the patient approached the ER triage desk and asked if we could help coordinate a ride home as [facility] does not do healthrides after 7pm. He states that he did not have any available friends or family that could come pick him up, nor did he have money to pay for a ride.     ER charge RN consulted and confirmed we would send the patient home in a cab. Cab called at [phone].     Jay Williamson, 7:53 PM

## 2019-02-28 ENCOUNTER — OFFICE VISIT (OUTPATIENT)
Dept: FAMILY MEDICINE | Facility: CLINIC | Age: 64
End: 2019-02-28
Payer: MEDICARE

## 2019-02-28 VITALS
OXYGEN SATURATION: 97 % | HEART RATE: 74 BPM | RESPIRATION RATE: 17 BRPM | SYSTOLIC BLOOD PRESSURE: 144 MMHG | TEMPERATURE: 98.3 F | DIASTOLIC BLOOD PRESSURE: 80 MMHG | WEIGHT: 137 LBS | HEIGHT: 64 IN | BODY MASS INDEX: 23.39 KG/M2

## 2019-02-28 DIAGNOSIS — Z79.4 TYPE 2 DIABETES MELLITUS WITH COMPLICATION, WITH LONG-TERM CURRENT USE OF INSULIN (H): Primary | ICD-10-CM

## 2019-02-28 DIAGNOSIS — E11.8 TYPE 2 DIABETES MELLITUS WITH COMPLICATION, WITH LONG-TERM CURRENT USE OF INSULIN (H): Primary | ICD-10-CM

## 2019-02-28 DIAGNOSIS — M54.16 LUMBAR RADICULAR PAIN: ICD-10-CM

## 2019-02-28 DIAGNOSIS — S72.112D CLOSED DISPLACED FRACTURE OF GREATER TROCHANTER OF LEFT FEMUR WITH ROUTINE HEALING, SUBSEQUENT ENCOUNTER: ICD-10-CM

## 2019-02-28 LAB
HBA1C MFR BLD: 6.9 % (ref 0–5.6)
LDLC SERPL DIRECT ASSAY-MCNC: 195 MG/DL
TSH SERPL DL<=0.005 MIU/L-ACNC: 0.59 MU/L (ref 0.4–4)

## 2019-02-28 PROCEDURE — 80306 DRUG TEST PRSMV INSTRMNT: CPT | Performed by: FAMILY MEDICINE

## 2019-02-28 PROCEDURE — 99215 OFFICE O/P EST HI 40 MIN: CPT | Performed by: FAMILY MEDICINE

## 2019-02-28 PROCEDURE — 36415 COLL VENOUS BLD VENIPUNCTURE: CPT | Performed by: FAMILY MEDICINE

## 2019-02-28 PROCEDURE — 83721 ASSAY OF BLOOD LIPOPROTEIN: CPT | Performed by: FAMILY MEDICINE

## 2019-02-28 PROCEDURE — 83036 HEMOGLOBIN GLYCOSYLATED A1C: CPT | Performed by: FAMILY MEDICINE

## 2019-02-28 PROCEDURE — 84443 ASSAY THYROID STIM HORMONE: CPT | Performed by: FAMILY MEDICINE

## 2019-02-28 RX ORDER — CALCIUM CARBONATE/VITAMIN D3 500-10/5ML
LIQUID (ML) ORAL
COMMUNITY
End: 2020-01-08

## 2019-02-28 RX ORDER — CELECOXIB 200 MG/1
200 CAPSULE ORAL DAILY
Qty: 30 CAPSULE | Refills: 0 | Status: SHIPPED | OUTPATIENT
Start: 2019-02-28 | End: 2019-02-28

## 2019-02-28 ASSESSMENT — ANXIETY QUESTIONNAIRES
3. WORRYING TOO MUCH ABOUT DIFFERENT THINGS: MORE THAN HALF THE DAYS
5. BEING SO RESTLESS THAT IT IS HARD TO SIT STILL: MORE THAN HALF THE DAYS
1. FEELING NERVOUS, ANXIOUS, OR ON EDGE: MORE THAN HALF THE DAYS
GAD7 TOTAL SCORE: 13
IF YOU CHECKED OFF ANY PROBLEMS ON THIS QUESTIONNAIRE, HOW DIFFICULT HAVE THESE PROBLEMS MADE IT FOR YOU TO DO YOUR WORK, TAKE CARE OF THINGS AT HOME, OR GET ALONG WITH OTHER PEOPLE: EXTREMELY DIFFICULT
7. FEELING AFRAID AS IF SOMETHING AWFUL MIGHT HAPPEN: NOT AT ALL
6. BECOMING EASILY ANNOYED OR IRRITABLE: MORE THAN HALF THE DAYS
2. NOT BEING ABLE TO STOP OR CONTROL WORRYING: MORE THAN HALF THE DAYS

## 2019-02-28 ASSESSMENT — PATIENT HEALTH QUESTIONNAIRE - PHQ9
5. POOR APPETITE OR OVEREATING: NEARLY EVERY DAY
SUM OF ALL RESPONSES TO PHQ QUESTIONS 1-9: 17

## 2019-02-28 ASSESSMENT — MIFFLIN-ST. JEOR: SCORE: 1317.43

## 2019-02-28 ASSESSMENT — PAIN SCALES - GENERAL: PAINLEVEL: EXTREME PAIN (8)

## 2019-02-28 NOTE — LETTER
[address]  [address]  [phone]    March 4, 2019      Faheem Jeff  [address]  [place] [address]      Dear Faheem,  Attached you will find a copy of your recent laboratory report.   Your urine testing does not show any unexpected substance/drugs.  There is no sign of the Ritalin you have been prescribed.     Your thyroid testing is normal.   Your cholesterol level is elevated as is the long term blood sugar testing (A1C).  Use of cholesterol lowering medication is recommended to decrease the risk for heart disease for you.  We will be calling you to discuss this.   Please call or MyChart message me if you have any questions.       Sincerely,         Dinorah Correa MD

## 2019-02-28 NOTE — PROGRESS NOTES
SUBJECTIVE:   Faheem Jeff is a 63 year old male who presents to clinic today for the following health issues:  Over 50% of this 40-minute visit is spent face-to-face with the patient, counseling him on my recommended treatment of his various problems and coordination of his care, as summarized below:     Back Pain Follow Up    Description:   Location of pain:  Low back   Character of pain: sharp  Pain radiation: radiates into the left buttocks and leg  Since last visit, pain is:  worsened  New numbness or weakness in legs, not attributed to pain:  YES    Intensity: Currently 8/10    History:   Pain interferes with job: Not applicable, disabled  Therapies tried without relief: acetaminophen (Tylenol) and NSAIDs, is having diarrhea from the ibuprofen  Therapies tried with relief: rest/inactivity     He is unable to ambulate much on his own anymore. He previously had a motorized scooter, but it broke, so he has been trying to get a new scooter.  He has someone working with him to get this.     He does not have a pain management doctor at this time, as his previous doctor is no longer in practice. In the emergency room he was given lidocaine patches, but insurance wouldn't cover it. He has had multiple problems with both Medicare and [employer]. He was evicted from his house for sometime. He would like to see his previous occupational therapist, who was a big help to him. He states that his pain is almost intolerable at this point, he really needs to have a permanent pain management doctor, and he needs something stronger for his pain. He takes ibuprofen 1000 mg twice a day. This has been giving him acid reflux. Over the counter medications have not been effective for him, including Advil, Tylenol, and aspirin. His pain is severely limiting his life. He is trying to avoid surgery as long as possible. He had a fall recently and injured his left leg. He has also been seen at [facility]. He needs to get his  "prescriptions refilled.   He has not had good results with some of the doctors that he has seen for his pain. He feels like he has been going on a wild goose myesha trying to find someone to treat his pain, but has been continually disappointed by the specialists that he has seen. The doctors he have seen have offered him many different interventions, but nothing has seemed to offer him much long term relief.   He also notes that he has a \"brain injury\" as well. He states that in the past he has done street drugs, and states that his pain is so great that \"no legal drug has done much for [him]\".   He feels that Dr. Winter was one of the last doctors that was able to restore some of his function.   He has also had some numbness and tingling in his feet.       Accompanying Signs & Symptoms:  Risk of Fracture:  Recent history of trauma or blunt force  Risk of Cauda Equina:  None  Risk of Infection:  None  Risk of Cancer:  None      Amount of exercise or physical activity: None    Problems taking medications regularly: Not taking Lidocaine patch prescribed as insurance does not cover    Medication side effects: none    Diet: regular (no restrictions)      ED/UC Followup:    Facility:  [facility]  Date of visit: 2/26/19  Reason for visit: hip pain  Current Status: still experiencing hip pain, unable to get pain under control with ibuprofen and tylenol     Problem list and histories reviewed & adjusted, as indicated.  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 No pertinent surgical history.    Social History     Tobacco Use     Smoking status: Current Every Day Smoker     Smokeless tobacco: Never Used   Substance Use Topics     Alcohol use: No     History reviewed. No pertinent family history.      Current Outpatient Medications   Medication Sig Dispense Refill      PATIENT ALERT  Warning:  All refills must be approved by MD. 0 0      PATIENT ALERT  Warning:  All refills must be approved by MD. 0 0     amLODIPine (NORVASC) 10 MG tablet Take 1 tablet (10 mg) by mouth daily 30 tablet 1     aspirin 81 MG chewable tablet CHEW AND SWALLOW 1 TABLET(81 MG) BY MOUTH DAILY 36 tablet 0     gabapentin (NEURONTIN) 300 MG capsule Take 1 capsule (300 mg) by mouth 3 times daily 90 capsule 1     magnesium oxide 400 MG CAPS        metFORMIN (GLUCOPHAGE) 1000 MG tablet Take 1 tablet (1,000 mg) by mouth 2 times daily (with meals) 60 tablet 1     methylphenidate (RITALIN) 10 MG tablet Take 10 mg by mouth       metoprolol tartrate (LOPRESSOR) 25 MG tablet Take 25 mg by mouth 2 times daily       order for DME Equipment being ordered: Glucometer per insurance preference, lancets, test strips.  Patient to check sugars 4 times daily, 90 day supply for test strips and lancets, refill 3 times 1 Device 0     Allergies   Allergen Reactions     Dust Mite Extract Unknown     Meclizine Other (See Comments)     Described \"feeling funny and burning.\"     Poplar Bud Extract Unknown     Trazodone Derivatives Unknown     \"Puts me in a coma\"     Recent Labs   Lab Test 08/02/18  0954   A1C 6.6*   *   HDL 53   TRIG 150*   ALT 20   CR 1.00   GFRESTIMATED 75   GFRESTBLACK >90   POTASSIUM 4.2      BP Readings from Last 3 Encounters:   02/28/19 144/80 " "  02/26/19 153/86   08/02/18 156/70    Wt Readings from Last 3 Encounters:   02/28/19 62.1 kg (137 lb)   02/26/19 61.2 kg (135 lb)   08/02/18 60.8 kg (134 lb)         Labs reviewed in EPIC    Reviewed and updated as needed this visit by clinical staff  Tobacco  Allergies  Meds  Med Hx  Surg Hx  Fam Hx  Soc Hx      Reviewed and updated as needed this visit by Provider       ROS:  Constitutional, HEENT, cardiovascular, pulmonary, gi and gu systems are negative, except as otherwise noted.    This document serves as a record of the services and decisions personally performed and made by Dinorah Correa MD. It was created on her behalf by Neela Meier, a trained medical scribe. The creation of this document is based the provider's statements to the medical scribe.  Neela Meier February 28, 2019 3:06 PM      OBJECTIVE:     /80   Pulse 74   Temp 98.3  F (36.8  C) (Oral)   Resp 17   Ht 1.626 m (5' 4\")   Wt 62.1 kg (137 lb)   SpO2 97%   BMI 23.52 kg/m    Body mass index is 23.52 kg/m .  GENERAL: healthy, alert and no distress  EYES: Eyes grossly normal to inspection, PERRL and conjunctivae and sclerae normal  HENT: ear canals and TM's normal, nose and mouth without ulcers or lesions  NECK: no adenopathy, no asymmetry, masses, or scars and thyroid normal to palpation  RESP: lungs clear to auscultation - no rales, rhonchi or wheezes  CV: regular rate and rhythm, normal S1 S2, no S3 or S4, no murmur, click or rub, no peripheral edema and peripheral pulses strong  ABDOMEN: soft, nontender, no hepatosplenomegaly, no masses and bowel sounds normal  MS: tenderness in the lumbar paraspinous muscles.   Pain with ROM of both hips, left > right. Pain in left hip with flexion and external rotation. Mild tenderness to touch over the greater trochanter.   Reduced ROM in bilateral shoulders with pain on raising above horizontal.    SKIN: no suspicious lesions or rashes  NEURO: Normal strength and tone, speech normal, some " difficulty with communicating noted.    PSYCH: mentation appears normal, affect normal/bright    Diagnostic Test Results:    Results for orders placed or performed during the hospital encounter of 02/26/19   XR Pelvis w Hip Left 1 View    Narrative    PELVIS WITH UNILATERAL HIP ONE VIEW LEFT  2/26/2019 6:24 PM     HISTORY: Check for new fracture, history of left greater trochanter  fracture 1/4/2019. Now new fall.    COMPARISON: None.      Impression    IMPRESSION: Minimal cortical irregularity of the greater trochanter on  the left may be related to a healing fracture. No definite new  fracture or dislocation demonstrated.    KOREY ARVIZU MD       ASSESSMENT/PLAN:     1. Type 2 diabetes mellitus with complication, with long-term current use of insulin (H)  He is due for some of his chronic panel management. Will complete the needed diabetes labs today.   - HEMOGLOBIN A1C  - TSH WITH FREE T4 REFLEX  - LDL cholesterol direct    2. Closed displaced fracture of greater trochanter of left femur with routine healing, subsequent encounter  Discussed there results of his most recent xray, which possibly showed a healing fracture on the left greater trochanter. Prescription for Celebrex was given, advised only short term use to avoid any cardiac side effects. He will follow up with the [facility], possibly have a new MRI done of the lower back, and to discuss his pain treatment plan.   - celecoxib (CELEBREX) 200 MG capsule; Take 1 capsule (200 mg) by mouth daily  Dispense: 30 capsule; Refill: 0  - PAIN MANAGEMENT REFERRAL    3. Lumbar radicular pain  - obtain records from previous imaging and treatments.    Will need to complete a drug screen today given his history, and also in expectation that he will start following with pain management and need one soon any ways. Referral to pain management was also placed.   - Drug Abuse Screen Panel 13, Urine (Pain Care Package)  - PAIN MANAGEMENT  REFERRAL    Patient Instructions   Release of information for records from [facility] and [facility]    Referral to pain clinic.    Discontinue the ibuprofen now.  Begin Celebrex for the short term use for hip pain           The information in this document, created by the medical scribe for me, accurately reflects the services I personally performed and the decisions made by me. I have reviewed and approved this document for accuracy.     Dinorah Correa MD  [facility]

## 2019-02-28 NOTE — PATIENT INSTRUCTIONS
Release of information for records from [facility] and [facility]    Referral to pain clinic.    Discontinue the ibuprofen now.  Begin Celebrex for the short term use for hip pain

## 2019-02-28 NOTE — LETTER
My Depression Action Plan  Name: Faheem Jeff   Date of Birth 1955  Date: 2/28/2019    My doctor: [facility]   My clinic: [facility] [address] [address] [phone]          GREEN    ZONE   Good Control    What it looks like:     Things are going generally well. You have normal up s and down s. You may even feel depressed from time to time, but bad moods usually last less than a day.   What you need to do:  1. Continue to care for yourself (see self care plan)  2. Check your depression survival kit and update it as needed  3. Follow your physician s recommendations including any medication.  4. Do not stop taking medication unless you consult with your physician first.           YELLOW         ZONE Getting Worse    What it looks like:     Depression is starting to interfere with your life.     It may be hard to get out of bed; you may be starting to isolate yourself from others.    Symptoms of depression are starting to last most all day and this has happened for several days.     You may have suicidal thoughts but they are not constant.   What you need to do:     1. Call your care team, your response to treatment will improve if you keep your care team informed of your progress. Yellow periods are signs an adjustment may need to be made.     2. Continue your self-care, even if you have to fake it!    3. Talk to someone in your support network    4. Open up your depression survival kit           RED    ZONE Medical Alert - Get Help    What it looks like:     Depression is seriously interfering with your life.     You may experience these or other symptoms: You can t get out of bed most days, can t work or engage in other necessary activities, you have trouble taking care of basic hygiene, or basic responsibilities, thoughts of suicide or death that will not go away, self-injurious behavior.     What you need to do:  1. Call your  care team and request a same-day appointment. If they are not available (weekends or after hours) call your local crisis line, emergency room or 911.            Depression Self Care Plan / Survival Kit    Self-Care for Depression  Here s the deal. Your body and mind are really not as separate as most people think.  What you do and think affects how you feel and how you feel influences what you do and think. This means if you do things that people who feel good do, it will help you feel better.  Sometimes this is all it takes.  There is also a place for medication and therapy depending on how severe your depression is, so be sure to consult with your medical provider and/ or Behavioral Health Consultant if your symptoms are worsening or not improving.     In order to better manage my stress, I will:    Exercise  Get some form of exercise, every day. This will help reduce pain and release endorphins, the  feel good  chemicals in your brain. This is almost as good as taking antidepressants!  This is not the same as joining a gym and then never going! (they count on that by the way ) It can be as simple as just going for a walk or doing some gardening, anything that will get you moving.      Hygiene   Maintain good hygiene (Get out of bed in the morning, Make your bed, Brush your teeth, Take a shower, and Get dressed like you were going to work, even if you are unemployed).  If your clothes don't fit try to get ones that do.    Diet  I will strive to eat foods that are good for me, drink plenty of water, and avoid excessive sugar, caffeine, alcohol, and other mood-altering substances.  Some foods that are helpful in depression are: complex carbohydrates, B vitamins, flaxseed, fish or fish oil, fresh fruits and vegetables.    Psychotherapy  I agree to participate in Individual Therapy (if recommended).    Medication  If prescribed medications, I agree to take them.  Missing doses can result in serious side effects.  I  understand that drinking alcohol, or other illicit drug use, may cause potential side effects.  I will not stop my medication abruptly without first discussing it with my provider.    Staying Connected With Others  I will stay in touch with my friends, family members, and my primary care provider/team.    Use your imagination  Be creative.  We all have a creative side; it doesn t matter if it s oil painting, sand castles, or mud pies! This will also kick up the endorphins.    Witness Beauty  (AKA stop and smell the roses) Take a look outside, even in mid-winter. Notice colors, textures. Watch the squirrels and birds.     Service to others  Be of service to others.  There is always someone else in need.  By helping others we can  get out of ourselves  and remember the really important things.  This also provides opportunities for practicing all the other parts of the program.    Humor  Laugh and be silly!  Adjust your TV habits for less news and crime-drama and more comedy.    Control your stress  Try breathing deep, massage therapy, biofeedback, and meditation. Find time to relax each day.     My support system    Clinic Contact:  Phone number:    Contact 1:  Phone number:    Contact 2:  Phone number:    Gnosticist/:  Phone number:    Therapist:  Phone number:    Local crisis center:    Phone number:    Other community support:  Phone number:

## 2019-03-01 LAB
AMPHETAMINES UR QL: NOT DETECTED NG/ML
BARBITURATES UR QL SCN: NOT DETECTED NG/ML
BENZODIAZ UR QL SCN: NOT DETECTED NG/ML
BUPRENORPHINE UR QL: NOT DETECTED NG/ML
CANNABINOIDS UR QL: NOT DETECTED NG/ML
COCAINE UR QL SCN: NOT DETECTED NG/ML
D-METHAMPHET UR QL: NOT DETECTED NG/ML
METHADONE UR QL SCN: NOT DETECTED NG/ML
OPIATES UR QL SCN: NOT DETECTED NG/ML
OXYCODONE UR QL SCN: NOT DETECTED NG/ML
PCP UR QL SCN: NOT DETECTED NG/ML
PROPOXYPH UR QL: NOT DETECTED NG/ML
TRICYCLICS UR QL SCN: NOT DETECTED NG/ML

## 2019-03-01 ASSESSMENT — ANXIETY QUESTIONNAIRES: GAD7 TOTAL SCORE: 13

## 2019-03-01 NOTE — TELEPHONE ENCOUNTER
"Requested Prescriptions   Pending Prescriptions Disp Refills     celecoxib (CELEBREX) 200 MG capsule [Pharmacy Med Name: CELECOXIB 200MG CAPSULES]  POSSIBLE DUPLICATE REQUEST. FILLED ON 2/28/19.     90 capsule 0     Sig: TAKE 1 CAPSULE(200 MG) BY MOUTH DAILY    NSAID Medications Failed - 2/28/2019  4:38 PM       Failed - Blood pressure under 140/90 in past 12 months    BP Readings from Last 3 Encounters:   02/28/19 144/80   02/26/19 153/86   08/02/18 156/70                Failed - Patient is age 6-64 years       Passed - Normal ALT on file in past 12 months    Recent Labs   Lab Test 08/02/18  0954   ALT 20            Passed - Normal AST on file in past 12 months    Recent Labs   Lab Test 08/02/18  0954   AST 18            Passed - Recent (12 mo) or future (30 days) visit within the authorizing provider's specialty    Patient had office visit in the last 12 months or has a visit in the next 30 days with authorizing provider or within the authorizing provider's specialty.  See \"Patient Info\" tab in inbasket, or \"Choose Columns\" in Meds & Orders section of the refill encounter.             Passed - Normal CBC on file in past 12 months    Recent Labs   Lab Test 08/02/18  0954   WBC 5.8   RBC 4.74   HGB 14.3   HCT 42.5                   Passed - Medication is active on med list       Passed - Normal serum creatinine on file in past 12 months    Recent Labs   Lab Test 08/02/18  0954   CR 1.00               "

## 2019-03-02 ENCOUNTER — TELEPHONE (OUTPATIENT)
Dept: FAMILY MEDICINE | Facility: CLINIC | Age: 64
End: 2019-03-02

## 2019-03-02 DIAGNOSIS — Z79.4 TYPE 2 DIABETES MELLITUS WITH COMPLICATION, WITH LONG-TERM CURRENT USE OF INSULIN (H): ICD-10-CM

## 2019-03-02 DIAGNOSIS — E11.8 TYPE 2 DIABETES MELLITUS WITH COMPLICATION, WITH LONG-TERM CURRENT USE OF INSULIN (H): ICD-10-CM

## 2019-03-02 DIAGNOSIS — E78.5 HYPERLIPIDEMIA, UNSPECIFIED HYPERLIPIDEMIA TYPE: Primary | ICD-10-CM

## 2019-03-02 DIAGNOSIS — S72.112D CLOSED DISPLACED FRACTURE OF GREATER TROCHANTER OF LEFT FEMUR WITH ROUTINE HEALING, SUBSEQUENT ENCOUNTER: ICD-10-CM

## 2019-03-02 NOTE — RESULT ENCOUNTER NOTE
Please print letter and attach results.  PSK      Attached you will find a copy of your recent laboratory report.  Your urine testing does not show any unexpected substance/drugs.  There is no sign of the Ritalin you have been prescribed.    Your thyroid testing is normal.  Your cholesterol level is elevated as is the long term blood sugar testing (A1C).  Use of cholesterol lowering medication is recommended to decrease the risk for heart disease for you.  We will be calling you to discuss this.  Please call or MyChart message me if you have any questions.    Sincerely,         Dinorah Correa MD    abdomen

## 2019-03-02 NOTE — TELEPHONE ENCOUNTER
Please call patient re:  Results.   If agreeable - I would recommend Lipitor 10 mg daily.             Attached you will find a copy of your recent laboratory report.  Your urine testing does not show any unexpected substance/drugs.  There is no sign of the Ritalin you have been prescribed.    Your thyroid testing is normal.  Your cholesterol level is elevated as is the long term blood sugar testing (A1C).  Use of cholesterol lowering medication is recommended to decrease the risk for heart disease for you.  We will be calling you to discuss this.  Please call or MyChart message me if you have any questions.     Sincerely,         Dinorah Correa MD

## 2019-03-04 NOTE — TELEPHONE ENCOUNTER
This writer attempted to contact Faheem on 03/04/19      Reason for call results and left message.      If patient calls back:   Registered Nurse called. Follow Triage Call workflow        Anni Kennedy RN

## 2019-03-05 ENCOUNTER — TELEPHONE (OUTPATIENT)
Dept: PALLIATIVE MEDICINE | Facility: CLINIC | Age: 64
End: 2019-03-05

## 2019-03-05 RX ORDER — CELECOXIB 200 MG/1
CAPSULE ORAL
Qty: 90 CAPSULE | Refills: 0 | Status: SHIPPED | OUTPATIENT
Start: 2019-03-05 | End: 2019-03-10

## 2019-03-05 NOTE — TELEPHONE ENCOUNTER
patient returned call    Best number to reach caller: Cell number on file:    Telephone Information:   Mobile [phone]       Is it ok to leave a detailed message: YES

## 2019-03-05 NOTE — TELEPHONE ENCOUNTER
Letter was sent yesterday.    This writer attempted to contact patient on 03/05/19      Reason for call results and left message.      If patient calls back:   Registered Nurse called. Follow Triage Call workflow        Matilda Alexandre RN

## 2019-03-05 NOTE — TELEPHONE ENCOUNTER
Patient updated on the below.  Patient saw Dr. Caicedo at [facility] and was prescribed Atorvastatin 40 mg daily 9/20/18.  Patient has been taking it and is now out.  Agreed to continue; pls send a prescription if appropriate.    Patient is requesting an increase in Celebrex dose, pain is not controlled.  Has an apt with pain clinic on 3/11, looking to be addressed before the appointment.     Matilda Alexandre RN

## 2019-03-05 NOTE — TELEPHONE ENCOUNTER
Routing refill request to provider for review/approval because:  Patient failed age and BP for this medication per protocol.    Sarah Hobson RN, [facility]

## 2019-03-05 NOTE — TELEPHONE ENCOUNTER
[facility] Referral      1. Confirmed address with patient? Yes  2. Confirmed phone number with patient? Yes  3. Confirmed referring provider? Yes  4. Is the PCP the same as the referring provider? Yes  5. Has the patient been to any previous pain clinics? Yes  (If yes, send KEM with welcome letter)  6. Which insurance are we to bill for this appointment?  [place]    7. Informed pt of cancellation (48 hour) policy? Yes    REGARDING OPIOID MEDICATIONS: We will always address appropriateness of opioid pain medications, but we generally will not automatically take on a prescribing role. When we do take on prescribing of opioids for chronic pain, it is in collaboration with the referring physician for an intermediate period of time (months), with an expectation that the primary physician or provider will assume the prescribing role if medications are effective at stable doses with demonstrated compliance. Therefore, please do not assume that your prescribing responsibilities end on the day of pain clinic consultation.  8. Informed pt of prescribing policy? Yes    9.Please be aware that once you are established with a pain provider and location, you will need to continue have all future visits with that provider and location. It is best to determine what location is the most convenient for you and schedule with that one.    ** PATIENT INFORMED OF THIS POLICY Yes      9. Referring Provider: [facility]     10. Criteria for Triage Eval:   -Missed/Failed 1st DUAL appointment? N/A   -Medication Focused? N/A   -Mental Health Concerns? (e.g. Recent psych hospitalization/snap shot)? N/A   -Active substance abuse? N/A   -Patient behaviors (e.g. Offensive language/raised voice)? N/A     verbalization

## 2019-03-05 NOTE — TELEPHONE ENCOUNTER
Left VM for patient to schedule a new evaluation.        Cielo AUGUSTIN    [facility]

## 2019-03-06 NOTE — TELEPHONE ENCOUNTER
Faheem returned call    Best number to reach caller: Home number on file [phone] (home)    Is it ok to leave a detailed message: YES     Says he is still waiting to here back from someone in regards to increasing Celebrex dosage.  Please call as soon as possible.  Thank you

## 2019-03-06 NOTE — TELEPHONE ENCOUNTER
Reason for Call:  Other call back - routed to dyad/lindsey and parked.     Detailed comments: Patient wants to speak to a nurse regarding results - A1c is elevated and cholesterol and also if provider can increase Celebrex.     Phone Number Patient can be reached at: Cell number on file:    Telephone Information:   Mobile [phone]       Best Time: asap    Can we leave a detailed message on this number? YES    Call taken on 3/6/2019 at 4:42 PM by Charles Pinon

## 2019-03-06 NOTE — TELEPHONE ENCOUNTER
Reason for Call:  Other     Detailed comments: Patient called said he would like a higher dosage on the CELEBREX. He said he will be going to the pain clinic on Monday and they wont give him medication on his first visit.    Phone Number Patient can be reached at: Home number on file [phone] (home)    Best Time: anytime    Can we leave a detailed message on this number? YES    Call taken on 3/6/2019 at 9:50 AM by Buck Puga

## 2019-03-06 NOTE — TELEPHONE ENCOUNTER
I will route to Dr. Correa to address since she last saw this patient and has been the one who recommended lipitor   A1C shows diabetes is controlled with current medication regimen

## 2019-03-06 NOTE — TELEPHONE ENCOUNTER
Spoke with patient and he would like to increase the Celebrex due to his pain not being controlled on his current dose of celebrex. He is scheduled for the pain clinic on 3/11/19 but will most likely not get any mediation at that consult. Patient also wanted to know if atorvastatin should be continued at current dose he was previously prescribed (40 mg daily). Patient also would like to know if his metformin needs to be increased due to elevated A1C. He states he takes 1 and a half tablets of metformin in the morning (1,500 mg) and 1 tablet (1,000 mg) in the evening. Orders pended for atorvastatin; please advise on Celebrex increase and if metformin dosage should be changed. Patient would like this resolved ASAP so sending high priority message.         Yvon Nguyen RN, BSN

## 2019-03-10 RX ORDER — ATORVASTATIN CALCIUM 40 MG/1
40 TABLET, FILM COATED ORAL DAILY
Qty: 90 TABLET | Refills: 3 | Status: SHIPPED | OUTPATIENT
Start: 2019-03-10 | End: 2019-12-05

## 2019-03-10 RX ORDER — CELECOXIB 200 MG/1
200 CAPSULE ORAL 2 TIMES DAILY
Qty: 60 CAPSULE | Refills: 1 | Status: SHIPPED | OUTPATIENT
Start: 2019-03-10 | End: 2019-03-12

## 2019-03-11 ENCOUNTER — HOSPITAL ENCOUNTER (OUTPATIENT)
Facility: CLINIC | Age: 64
Setting detail: SPECIMEN
Discharge: HOME | End: 2019-03-11
Admitting: NURSE PRACTITIONER
Payer: MEDICARE

## 2019-03-11 ENCOUNTER — OFFICE VISIT (OUTPATIENT)
Dept: PALLIATIVE MEDICINE | Facility: CLINIC | Age: 64
End: 2019-03-11
Payer: MEDICARE

## 2019-03-11 VITALS — SYSTOLIC BLOOD PRESSURE: 160 MMHG | OXYGEN SATURATION: 98 % | DIASTOLIC BLOOD PRESSURE: 74 MMHG | HEART RATE: 77 BPM

## 2019-03-11 DIAGNOSIS — Z71.6 TOBACCO ABUSE COUNSELING: ICD-10-CM

## 2019-03-11 DIAGNOSIS — Z79.891 LONG TERM (CURRENT) USE OF OPIATE ANALGESIC: Primary | ICD-10-CM

## 2019-03-11 DIAGNOSIS — M50.30 DDD (DEGENERATIVE DISC DISEASE), CERVICAL: ICD-10-CM

## 2019-03-11 DIAGNOSIS — M51.369 DDD (DEGENERATIVE DISC DISEASE), LUMBAR: ICD-10-CM

## 2019-03-11 PROCEDURE — 99205 OFFICE O/P NEW HI 60 MIN: CPT | Performed by: NURSE PRACTITIONER

## 2019-03-11 PROCEDURE — 80307 DRUG TEST PRSMV CHEM ANLYZR: CPT | Performed by: NURSE PRACTITIONER

## 2019-03-11 ASSESSMENT — PAIN SCALES - GENERAL: PAINLEVEL: WORST PAIN (10)

## 2019-03-11 NOTE — TELEPHONE ENCOUNTER
Please call patient re:   1.  Cholesterol medication - he can resume the Lipitor 40 mg daily that he has previously taken for this.    2.  He should continue the current doses of the Metformin since he is in the goal range of less than 7% for this.      3.   For the pain - he can increase to twice a day Celebrex use.       PSK
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
## 2019-03-11 NOTE — TELEPHONE ENCOUNTER
Patient states he will  Atorvastatin today.     Patient states that he has been taking Metformin differently than what is documented in Epic.   Per Epic, patient is to take 1,000 mg of Metformin BID.     Patient states that his provider Dr. Marian Weston at [facility] had advised patient to take Metformin with the dosing 1,500 mg in the AM and 1,000 mg in the PM. Patient states he has been doing this for the last couple of years.     Routing to provider to review and advise on Metformin dosing discrepancy.     Pricila Almonte RN

## 2019-03-11 NOTE — TELEPHONE ENCOUNTER
Patient  returned call    Best number to reach caller: Home number on file [phone] (home)    Is it ok to leave a detailed message: Not Applicable       Has questions

## 2019-03-11 NOTE — TELEPHONE ENCOUNTER
Spoke with Faheem to relay provider message. He states understanding and has no further questions. Will  medication today.     Pricila Almonte RN

## 2019-03-11 NOTE — PATIENT INSTRUCTIONS
After Visit Instructions:     Thank you for coming to [facility] for your care. It is my goal to partner with you to help you reach your optimal state of health.     Continue daily self-care, identifying contributing factors, and monitoring variations in pain level. Continue to integrate self-care into your life.      1. Schedule physical therapy assessment  2. Schedule follow-up with DAVID Mcadams NP-C in 2 weeks  3. Labs: Urine drug screen   4. Procedures recommended: We will talk about this in the future    5. Release of information: [facility]  6. Medication recommendations:   1. We will talk about this at your next appt.       DAVID Drummond NP-C  [facility]  Clinic Number:  [phone]

## 2019-03-12 DIAGNOSIS — S72.112D CLOSED DISPLACED FRACTURE OF GREATER TROCHANTER OF LEFT FEMUR WITH ROUTINE HEALING, SUBSEQUENT ENCOUNTER: ICD-10-CM

## 2019-03-12 NOTE — LETTER
[address]  [address]  [phone]    March 14, 2019      Faheem Jeff  [address]  [facility] [address]      Dear Faheem,    We recently received a call from your pharmacy requesting a refill of your medication.    A review of your chart indicates that an appointment is required with your provider.  Please call the clinic at [phone] to schedule your appointment.    We have authorized one refill of your medication to allow time for you to schedule.   If you have a history of diabetes or high cholesterol, please come in fasting for the appointment. Fasting entails nothing to eat or drink 8 hours prior to your appointment; with the exception on water. You may take your medication the day of the appointment.    Thank you,      Dinorah Correa M.D.

## 2019-03-12 NOTE — TELEPHONE ENCOUNTER
"Requested Prescriptions   Pending Prescriptions Disp Refills     celecoxib (CELEBREX) 200 MG capsule [Pharmacy Med Name: CELECOXIB 200MG CAPSULES]  May be duplicate request.  Last Written Prescription Date:  3/10/19  Last Fill Quantity: 60 capsule,  # refills: 1   Last office visit: 2/28/2019 with prescribing provider:  Dr. Correa   Future Office Visit:   Next 5 appointments (look out 90 days)    Mar 25, 2019  1:30 PM CDT  Return Visit with DAVID Cosme CNP  [facility] ([facility] [address]  [address]  [facility] [address]  [phone]          180 capsule 1     Sig: TAKE 1 CAPSULE(200 MG) BY MOUTH TWICE DAILY    NSAID Medications Failed - 3/12/2019  8:28 AM       Failed - Blood pressure under 140/90 in past 12 months    BP Readings from Last 3 Encounters:   03/11/19 160/74   02/28/19 144/80   02/26/19 153/86                Failed - Patient is age 6-64 years       Passed - Normal ALT on file in past 12 months    Recent Labs   Lab Test 08/02/18  0954   ALT 20            Passed - Normal AST on file in past 12 months    Recent Labs   Lab Test 08/02/18  0954   AST 18            Passed - Recent (12 mo) or future (30 days) visit within the authorizing provider's specialty    Patient had office visit in the last 12 months or has a visit in the next 30 days with authorizing provider or within the authorizing provider's specialty.  See \"Patient Info\" tab in inbasket, or \"Choose Columns\" in Meds & Orders section of the refill encounter.             Passed - Normal CBC on file in past 12 months    Recent Labs   Lab Test 08/02/18  0954   WBC 5.8   RBC 4.74   HGB 14.3   HCT 42.5                   Passed - Medication is active on med list       Passed - Normal serum creatinine on file in past 12 months    Recent Labs   Lab Test 08/02/18  0954   CR 1.00               "

## 2019-03-14 LAB — PAIN DRUG SCR UR W RPTD MEDS: NORMAL

## 2019-03-14 RX ORDER — CELECOXIB 200 MG/1
CAPSULE ORAL
Qty: 180 CAPSULE | Refills: 0 | Status: SHIPPED | OUTPATIENT
Start: 2019-03-14 | End: 2019-12-05

## 2019-03-14 NOTE — TELEPHONE ENCOUNTER
Routing refill request to provider for review/approval because:  **Patient is asking for a 90 day supply. Was only given 2 months on most recent script, RN can not change to 90.  Fails protocol, pt above recommended age limit and BP above goal.    Bonny Regalado, RN

## 2019-03-14 NOTE — TELEPHONE ENCOUNTER
Given 90 day supply but due for follow up with Dr. Correa - last visit with Dr. Correa 2/28/19 recommended follow up with her in one month

## 2019-03-15 ENCOUNTER — TELEPHONE (OUTPATIENT)
Dept: PALLIATIVE MEDICINE | Facility: CLINIC | Age: 64
End: 2019-03-15

## 2019-03-15 NOTE — TELEPHONE ENCOUNTER
Please call Faheem. His UDS showed metabolites of cocaine. Per Pain Center policy, I cannot prescribe opiate pain medication for him due to the cocaine use. I have placed a referral for Addiction Med for consideration of Suboxone to help with his pain. He has a Pain PT appt on Monday. I'd like him to keep that appt as well as his appt with me the following week to discuss additional options for helping him to manage his multiple pain issues.     DAVID Drummond, NP-C  [facility]

## 2019-03-15 NOTE — TELEPHONE ENCOUNTER
Outreach X1. Left a  requesting call back. Provided call back number.    KESHIA OdonnellN, RN  Care Coordinator  [facility] [place]

## 2019-03-18 DIAGNOSIS — Z79.4 TYPE 2 DIABETES MELLITUS WITH COMPLICATION, WITH LONG-TERM CURRENT USE OF INSULIN (H): ICD-10-CM

## 2019-03-18 DIAGNOSIS — E11.8 TYPE 2 DIABETES MELLITUS WITH COMPLICATION, WITH LONG-TERM CURRENT USE OF INSULIN (H): ICD-10-CM

## 2019-03-18 PROBLEM — G89.29 OTHER CHRONIC PAIN: Status: ACTIVE | Noted: 2019-03-18

## 2019-03-18 NOTE — TELEPHONE ENCOUNTER
Outreach X2. Left a  requesting call back. Provided call back number.    KESHIA OdonnellN, RN  Care Coordinator  [facility]

## 2019-03-20 NOTE — TELEPHONE ENCOUNTER
Prescription approved per [facility].  Matilda Alexandre RN

## 2019-03-20 NOTE — TELEPHONE ENCOUNTER
Reviewed chart. Pt No-showed his appointment with Pain PT on Monday 3/18.     Called pt and LM requesting a call back at the main clinic number    TRINITY Goodson, RN-BC  Patient Care Supervisor/Care Coordinator  [facility] [place]

## 2019-03-25 ENCOUNTER — OFFICE VISIT (OUTPATIENT)
Dept: PALLIATIVE MEDICINE | Facility: CLINIC | Age: 64
End: 2019-03-25
Payer: MEDICARE

## 2019-03-25 VITALS
WEIGHT: 137 LBS | BODY MASS INDEX: 23.52 KG/M2 | OXYGEN SATURATION: 98 % | RESPIRATION RATE: 16 BRPM | HEART RATE: 80 BPM | DIASTOLIC BLOOD PRESSURE: 75 MMHG | SYSTOLIC BLOOD PRESSURE: 135 MMHG

## 2019-03-25 DIAGNOSIS — Z71.6 TOBACCO ABUSE COUNSELING: ICD-10-CM

## 2019-03-25 DIAGNOSIS — F19.20 CHEMICAL DEPENDENCY (H): ICD-10-CM

## 2019-03-25 DIAGNOSIS — M54.16 LUMBAR RADICULAR PAIN: Primary | ICD-10-CM

## 2019-03-25 PROCEDURE — 99214 OFFICE O/P EST MOD 30 MIN: CPT | Performed by: NURSE PRACTITIONER

## 2019-03-25 ASSESSMENT — PAIN SCALES - GENERAL: PAINLEVEL: SEVERE PAIN (6)

## 2019-03-25 NOTE — PATIENT INSTRUCTIONS
After Visit Instructions:     Thank you for coming to [facility] for your care. It is my goal to partner with you to help you reach your optimal state of health.     Continue daily self-care, identifying contributing factors, and monitoring variations in pain level. Continue to integrate self-care into your life.      1. Keep physical therapy assessment appt on 4/11/19 at 12:30 pm  2. Schedule follow-up with DAVID Mcadams NP-C in 4 weeks after you've had the MRI.  3. Imaging: MRI of lumbar spine. Order given to take to [facility].   4. Procedures recommended: We will talk about this after your MRI   5. Referrals: Addiction Medicine. They will call you to schedule.   6. Please have Max Moran (ILS worker) send paperwork regarding scooter. Ok for him to call since you signed the release to communicate. If Max can come to your appts with Uma, this may be helpful.   7. Medication recommendations:   1. No change to current medications.     DAVID Drummond NP-C  [facility]  Clinic Number:  [phone]   Fax number : [phone]

## 2019-03-25 NOTE — PROGRESS NOTES
[facility]    CHIEF COMPLAINT: Multiple pain concerns.     INTERVAL HISTORY:  Last seen on 3/11/19.        Recommendations/plan at the last visit included:               1. Schedule physical therapy assessment  2. Schedule follow-up with DAVID Mcadams NP-C in 2 weeks  3. Labs: Urine drug screen   4. Procedures recommended: We will talk about this in the future    5. Release of information: [facility]  6. Medication recommendations:        1. We will talk about this at your next appt.        Since last visit:   - No showed PT assessment.   -Urine drug screen showed metabolites of cocaine.    Pain Information:   Pain quality: Aching, Exhausting, Miserable, Nagging, Shooting, Stabbing, Tiring and Unbearable    Pain rating: intensity ranges from 6/10 to 8/10, and averages 7/10 on a 0-10 scale.      Annual Controlled Substance Agreement/UDS due date: N/A    Current Pain Relevant Medications:    Gabapentin 300 mg TID  Celebrex 200 mg every day  Ritalin 10 mg daily  Tylenol: Up to 400 mg daily      Previous Pain Relevant Medications: (H--helped; HI--Helped initially; SWH--Somewhat helpful; NH--No help; W--worse; SE--side effects; ?--Unsure if helpful)   NOTE: This medication information taken from patient's intake form, not medical records.               Opiates: Codeine:H, Hydrocodone:H, Methadone:NH, Morphine:N, Oxycodone:H, Tramadol:NH              NSAIDS: Celebrex:NH, Ibuprofen:SWH, Relafen:NH              Muscle Relaxants: Flexeril:H              Anti-migraine mediations: none noted              Anti-depressants: Amitriptyline:NH, Nortriptyline:NH              Sleep aids:none noted              Anxiolytics: none noted              Neuropathics: Gabapentin:H                Topicals: none noted              Other medications not covered above: none noted.      Any illicit drug use: Hx of polysubstance abuse, cocaine,unsure of last use   EtOH use: 9/5/06, last use   Caffeine use:  1-2 per day.   Nicotine use: 1 PPD   Any use of prescriptions other than how they were prescribed:denies         THE 4 As OF OPIOID MAINTENANCE ANALGESIA    Analgesia: Is pain relief clinically significant? N/A   Activity: Is patient functional and able to perform Activities of Daily Living? N/A   Adverse effects: Is patient free from adverse side effects from opiates? N/A   Adherence to Rx protocol: Is patient adhering to Controlled Substance Agreement and taking medications ONLY as ordered? N/A     Is Narcan prescribed for opiate use >50 MME daily? N/A     [place] Board of Pharmacy Data Base Reviewed:    YES; multiple controlled substances including opiates, stimulants          Medications:  Current Outpatient Medications   Medication Sig Dispense Refill     amLODIPine (NORVASC) 10 MG tablet Take 1 tablet (10 mg) by mouth daily 30 tablet 1     aspirin 81 MG chewable tablet CHEW AND SWALLOW 1 TABLET(81 MG) BY MOUTH DAILY 36 tablet 0     atorvastatin (LIPITOR) 40 MG tablet Take 1 tablet (40 mg) by mouth daily 90 tablet 3     celecoxib (CELEBREX) 200 MG capsule TAKE 1 CAPSULE(200 MG) BY MOUTH TWICE DAILY 180 capsule 0     gabapentin (NEURONTIN) 300 MG capsule Take 1 capsule (300 mg) by mouth 3 times daily 90 capsule 1     magnesium oxide 400 MG CAPS        metFORMIN (GLUCOPHAGE) 1000 MG tablet Take 1 tablet (1,000 mg) by mouth 2 times daily (with meals) 60 tablet 1     metFORMIN (GLUCOPHAGE) 500 MG tablet TAKE 3 TABLETS BY MOUTH IN THE MORNING AND 2 TABLETS IN THE EVENING 450 tablet 1     methylphenidate (RITALIN) 10 MG tablet Take 10 mg by mouth       metoprolol tartrate (LOPRESSOR) 25 MG tablet Take 25 mg by mouth 2 times daily       order for DME Equipment being ordered: Glucometer per insurance preference, lancets, test strips.  Patient to check sugars 4 times daily, 90 day supply for test strips and lancets, refill 3 times 1 Device 0       Review of Systems: A 10-point review of systems was negative, with the  exception of chronic pain issues.      Social History: Reviewed; unchanged from previous consultation.      Family history: Reviewed; unchanged from previous consultation.     PHYSICAL EXAM    Vitals:    03/25/19 1322   BP: 135/75   BP Location: Right arm   Patient Position: Chair   Cuff Size: Adult Regular   Pulse: 80   Resp: 16   SpO2: 98%   Weight: 62.1 kg (137 lb)       Constitutional: healthy, alert and mild distress  HEENT: Head atraumatic, normocephalic. Eyes without conjunctival injection or jaundice. Neck supple. No obvious neck masses.  Cardiovascular: No edema or JVD appreciated. Peripheral pulses are palpable, no edema on bilateral lower extremities  Skin: No rash, lesions, or petechiae of exposed skin.   Extremities: No clubbing, cyanosis, or edema to exposed extremities  Psychiatric/mental status: Alert, without lethargy or stupor. Appropriate affect. Mood: Intermittently upset regarding discussion of his cocaine use and concern for not being able to get opiates.  Poor historian due to pre-existing TBI  Neurologic exam:  CN:  Cranial nerves 2-12 are grossly normal.  Motor:  3/5 UE and 2/5 LE strength    Musculoskeletal exam:  Cervical spine:             Flex:  10 degrees, painful              Ext: 10 degrees, painful              Rotation to right: 10 degrees, painful              Rotation to left: 10 degrees, painful              Tenderness in the cervical spine at midline. Yes             Tenderness in the cervical paraspinal muscles. Yes  Thoracic spine:              Kyphosis. Yes             Tenderness in the thoracic spine at midline. Yes             Tenderness in the thoracic paraspinal muscles. Yes  Lumbar/Sacral spine:             Forward Flexion:  refused             Ext: Refused              Rotation/ext to right: Refused             Rotation/ext to left: Refused             Lordosis. No             Tenderness in the lumbar spine at midline. Yes             Tenderness in the lumbar paraspinal  muscles.Yes        DIRE Score for ongoing opioid management is calculated as follows:    Diagnosis = 3 pts (advanced condition; severe pain/objective findings)    Intractability = 1 pt (few therapies tried; passive patient role)    Risk        Psych = 2 pts (personality dysfunction/mental illness that moderately interferes with care)         Chem Hlth = 2 pts (use of medications to cope with stress; chemical dependency in remission)       Reliability = 2 pts (occasional difficulties with compliance; generally reliable)       Social = 2 pts (reduction in some relationships/life rolls)       (Psych + Chem hlth + Reliability + Social) = 12    Efficacy = 2 pts (moderate benefit/function; low med dose; too early/not tried meds)    DIRE Score = 14        7-13: likely NOT suitable candidate for long-term opioid analgesia       14-21: may be a suitable candidate for long-term opioid analgesia        Previous Diagnostic Tests:   Imaging Studies:                ASSESSMENT:   1.  DDD of lumbar spine.   2.  Recent fracture of left femur  3.  Bilateral knee osteoarthritis   4.  Hx: traumatic brain injury 2006  5.  Hx: Polysubstance abuse in remission, current tobacco use    Faheem presents back to follow-up after urine drug screen to discuss medication management.  As noted above his UDS was positive for metabolites of cocaine.  He does not dispute cocaine use however is concerned that he cannot get opiate pain medication.  We had previously tried to reach him by phone to let him know about the test results and that I had placed in addiction medicine referral.  He was having difficulty with his phone working properly and did not get the message.  I walked him across the [place]rillo to the addiction medicine clinic so that he could schedule this appointment.  Due to his TBI he has very difficult time remembering appointments and no showed his pain PT assessment although he was provided with a calendar that had all of his appointments on  it and that appointment was listed.  He provides a consent to communicate with his ILS worker and his traumatic brain injury .  I have asked Faheem to see if his ILS worker is able to come with him to his next appointment to help coordinate and have better follow through on his treatment plan.  We will get an updated MRI of his lumbar spine to review for possible interventional procedures.  I have given him a paper order to hand carry to [facility] as that is his preference for imaging location.    Tobacco use: Reviewed at this appointment    Plan:    Diagnosis reviewed, treatment option addressed, and risk/benifits discussed.  Self-care instructions given.  I am recommending a multidisciplinary treatment plan to help this patient better manage pain.        1. Keep physical therapy assessment appt on 4/11/19 at 12:30 pm  2. Schedule follow-up with DAVID Mcadams NP-C in 4 weeks after you've had the MRI.  3. Imaging: MRI of lumbar spine. Order given to take to [facility].   4. Procedures recommended: We will talk about this after your MRI   5. Referrals: Addiction Medicine. They will call you to schedule.   6. Please have Max Luisa (ILS worker) send paperwork regarding scooter. Ok for him to call since you signed the release to communicate. If Max can come to your appts with Uma, this may be helpful.   7. Medication recommendations:   1. No change to current medications.     Total time spent face to face was 30 minutes and more than 50% of face to face time was spent in counseling and/or coordination of care regarding the diagnosis and recommendations above.      DAVID Drummond NP-C   [facility]    Disclaimer: This note consists of symbols derived from keyboarding, dictation and/or voice recognition software. As a result, there may be errors in the script that have gone undetected. Please consider this when interpreting information found in this chart.

## 2019-05-14 ENCOUNTER — TELEPHONE (OUTPATIENT)
Dept: PALLIATIVE MEDICINE | Facility: CLINIC | Age: 64
End: 2019-05-14

## 2019-05-14 NOTE — TELEPHONE ENCOUNTER
Today (5-) patient missed their 3rd appointment within the [facility] program.    Routing to the nurse team to follow up with patient.    Juliane Tougaloo  Patient Representative  [facility]

## 2019-05-15 NOTE — TELEPHONE ENCOUNTER
Called patient to inquire about what barriers he has to making his appointments.  Phone message says this person is not accepting calls right now and there is no option for leaving a voicemail.     KESHIA OdonnellN, RN  Care Coordinator  [facility]

## 2019-05-16 NOTE — TELEPHONE ENCOUNTER
Please place appt note that Faheem is not to schedule any further appts with therapies until seen by me. Routing to PCP,Dr Correa, as an FYI.       DAVID Drummond, NP-C  [facility] [place]

## 2019-05-17 NOTE — TELEPHONE ENCOUNTER
Added scheduling note to patient's last no show appt for schedulers' reference.        Juliane Napakiak  Patient Representative  [facility]

## 2019-10-13 DIAGNOSIS — Z79.4 TYPE 2 DIABETES MELLITUS WITH COMPLICATION, WITH LONG-TERM CURRENT USE OF INSULIN (H): ICD-10-CM

## 2019-10-13 DIAGNOSIS — E11.8 TYPE 2 DIABETES MELLITUS WITH COMPLICATION, WITH LONG-TERM CURRENT USE OF INSULIN (H): ICD-10-CM

## 2019-10-14 NOTE — TELEPHONE ENCOUNTER
"Requested Prescriptions   Pending Prescriptions Disp Refills     metFORMIN (GLUCOPHAGE) 500 MG tablet [Pharmacy Med Name: METFORMIN 500MG TABLETS] 450 tablet 5     Sig: TAKE 3 TABLETS BY MOUTH IN THE MORNING AND 2 TABLETS IN THE EVENING       Biguanide Agents Failed - 10/13/2019  5:09 AM        Failed - Blood pressure less than 140/90 in past 6 months     BP Readings from Last 3 Encounters:   03/25/19 135/75   03/11/19 160/74   02/28/19 144/80                 Failed - Patient has documented A1c within the specified period of time.     If HgbA1C is 8 or greater, it needs to be on file within the past 3 months.  If less than 8, must be on file within the past 6 months.     Recent Labs   Lab Test 02/28/19  1545   A1C 6.9*             Failed - Patient's CR is NOT>1.4 OR Patient's EGFR is NOT<45 within past 12 mos.     Recent Labs   Lab Test 08/02/18  0954   GFRESTIMATED 75   GFRESTBLACK >90       Recent Labs   Lab Test 08/02/18  0954   CR 1.00             Failed - Recent (6 mo) or future (30 days) visit within the authorizing provider's specialty     Patient had office visit in the last 6 months or has a visit in the next 30 days with authorizing provider or within the authorizing provider's specialty.  See \"Patient Info\" tab in inbasket, or \"Choose Columns\" in Meds & Orders section of the refill encounter.            Passed - Patient has documented LDL within the past 12 mos.     Recent Labs   Lab Test 02/28/19  1545   *             Passed - Patient has had a Microalbumin in the past 15 mos.     Recent Labs   Lab Test 08/02/18  0956   MICROL 5   UMALCR 10.57             Passed - Patient is age 10 or older        Passed - Patient does NOT have a diagnosis of CHF.        Passed - Medication is active on med list        metFORMIN (GLUCOPHAGE) 500 MG tablet  Last Written Prescription Date:  3/20/19  Last Fill Quantity: 450,  # refills: 1   Last office visit: 2/28/2019 with prescribing provider:  Dr. Correa   Future " Office Visit:

## 2019-10-16 NOTE — TELEPHONE ENCOUNTER
Routing refill request to provider for review/approval because:  Labs not current:  Creatinine, A1C  BP and visit not current. Two doses on current medication list.    Sarah Hobson RN, [facility]

## 2019-12-05 ENCOUNTER — OFFICE VISIT (OUTPATIENT)
Dept: FAMILY MEDICINE | Facility: CLINIC | Age: 64
End: 2019-12-05
Payer: MEDICARE

## 2019-12-05 ENCOUNTER — MEDICAL CORRESPONDENCE (OUTPATIENT)
Dept: HEALTH INFORMATION MANAGEMENT | Facility: CLINIC | Age: 64
End: 2019-12-05

## 2019-12-05 ENCOUNTER — TELEPHONE (OUTPATIENT)
Dept: FAMILY MEDICINE | Facility: CLINIC | Age: 64
End: 2019-12-05

## 2019-12-05 VITALS
HEART RATE: 70 BPM | TEMPERATURE: 97.9 F | DIASTOLIC BLOOD PRESSURE: 70 MMHG | WEIGHT: 137.8 LBS | HEIGHT: 64 IN | SYSTOLIC BLOOD PRESSURE: 136 MMHG | RESPIRATION RATE: 24 BRPM | BODY MASS INDEX: 23.52 KG/M2 | OXYGEN SATURATION: 99 %

## 2019-12-05 DIAGNOSIS — E78.5 HYPERLIPIDEMIA, UNSPECIFIED HYPERLIPIDEMIA TYPE: ICD-10-CM

## 2019-12-05 DIAGNOSIS — M54.12 CERVICAL RADICULOPATHY: ICD-10-CM

## 2019-12-05 DIAGNOSIS — E11.8 TYPE 2 DIABETES MELLITUS WITH COMPLICATION, WITH LONG-TERM CURRENT USE OF INSULIN (H): ICD-10-CM

## 2019-12-05 DIAGNOSIS — M54.16 LUMBAR RADICULAR PAIN: ICD-10-CM

## 2019-12-05 DIAGNOSIS — K21.9 GASTROESOPHAGEAL REFLUX DISEASE, ESOPHAGITIS PRESENCE NOT SPECIFIED: ICD-10-CM

## 2019-12-05 DIAGNOSIS — I10 ESSENTIAL HYPERTENSION: ICD-10-CM

## 2019-12-05 DIAGNOSIS — E11.9 TYPE 2 DIABETES MELLITUS WITHOUT COMPLICATION, WITH LONG-TERM CURRENT USE OF INSULIN (H): Primary | ICD-10-CM

## 2019-12-05 DIAGNOSIS — H91.93 DECREASED HEARING OF BOTH EARS: ICD-10-CM

## 2019-12-05 DIAGNOSIS — Z79.4 TYPE 2 DIABETES MELLITUS WITHOUT COMPLICATION, WITH LONG-TERM CURRENT USE OF INSULIN (H): Primary | ICD-10-CM

## 2019-12-05 DIAGNOSIS — G47.12 IDIOPATHIC HYPERSOMNIA WITHOUT LONG SLEEP TIME: ICD-10-CM

## 2019-12-05 DIAGNOSIS — Z79.4 TYPE 2 DIABETES MELLITUS WITH COMPLICATION, WITH LONG-TERM CURRENT USE OF INSULIN (H): ICD-10-CM

## 2019-12-05 DIAGNOSIS — G47.33 OSA (OBSTRUCTIVE SLEEP APNEA): ICD-10-CM

## 2019-12-05 DIAGNOSIS — S06.9X9D TRAUMATIC BRAIN INJURY WITH LOSS OF CONSCIOUSNESS, SUBSEQUENT ENCOUNTER: ICD-10-CM

## 2019-12-05 LAB
ALBUMIN SERPL-MCNC: 3.7 G/DL (ref 3.4–5)
ALP SERPL-CCNC: 85 U/L (ref 40–150)
ALT SERPL W P-5'-P-CCNC: 20 U/L (ref 0–70)
ANION GAP SERPL CALCULATED.3IONS-SCNC: 5 MMOL/L (ref 3–14)
AST SERPL W P-5'-P-CCNC: 11 U/L (ref 0–45)
BILIRUB SERPL-MCNC: 0.2 MG/DL (ref 0.2–1.3)
BUN SERPL-MCNC: 22 MG/DL (ref 7–30)
CALCIUM SERPL-MCNC: 9.5 MG/DL (ref 8.5–10.1)
CHLORIDE SERPL-SCNC: 105 MMOL/L (ref 94–109)
CHOLEST SERPL-MCNC: 290 MG/DL
CO2 SERPL-SCNC: 31 MMOL/L (ref 20–32)
CREAT SERPL-MCNC: 0.86 MG/DL (ref 0.66–1.25)
ERYTHROCYTE [DISTWIDTH] IN BLOOD BY AUTOMATED COUNT: 13.9 % (ref 10–15)
GFR SERPL CREATININE-BSD FRML MDRD: 90 ML/MIN/{1.73_M2}
GLUCOSE SERPL-MCNC: 125 MG/DL (ref 70–99)
HBA1C MFR BLD: 6.8 % (ref 0–5.6)
HCT VFR BLD AUTO: 40.2 % (ref 40–53)
HDLC SERPL-MCNC: 59 MG/DL
HGB BLD-MCNC: 13.6 G/DL (ref 13.3–17.7)
LDLC SERPL CALC-MCNC: 203 MG/DL
MCH RBC QN AUTO: 29.6 PG (ref 26.5–33)
MCHC RBC AUTO-ENTMCNC: 33.8 G/DL (ref 31.5–36.5)
MCV RBC AUTO: 87 FL (ref 78–100)
NONHDLC SERPL-MCNC: 231 MG/DL
PLATELET # BLD AUTO: 323 10E9/L (ref 150–450)
POTASSIUM SERPL-SCNC: 4.6 MMOL/L (ref 3.4–5.3)
PROT SERPL-MCNC: 7.4 G/DL (ref 6.8–8.8)
RBC # BLD AUTO: 4.6 10E12/L (ref 4.4–5.9)
SODIUM SERPL-SCNC: 141 MMOL/L (ref 133–144)
TRIGL SERPL-MCNC: 141 MG/DL
WBC # BLD AUTO: 5.7 10E9/L (ref 4–11)

## 2019-12-05 PROCEDURE — 80061 LIPID PANEL: CPT | Performed by: FAMILY MEDICINE

## 2019-12-05 PROCEDURE — 83036 HEMOGLOBIN GLYCOSYLATED A1C: CPT | Performed by: FAMILY MEDICINE

## 2019-12-05 PROCEDURE — 80053 COMPREHEN METABOLIC PANEL: CPT | Performed by: FAMILY MEDICINE

## 2019-12-05 PROCEDURE — 85027 COMPLETE CBC AUTOMATED: CPT | Performed by: FAMILY MEDICINE

## 2019-12-05 PROCEDURE — 36415 COLL VENOUS BLD VENIPUNCTURE: CPT | Performed by: FAMILY MEDICINE

## 2019-12-05 PROCEDURE — 99214 OFFICE O/P EST MOD 30 MIN: CPT | Performed by: FAMILY MEDICINE

## 2019-12-05 RX ORDER — ATORVASTATIN CALCIUM 40 MG/1
40 TABLET, FILM COATED ORAL DAILY
Qty: 90 TABLET | Refills: 3 | Status: SHIPPED | OUTPATIENT
Start: 2019-12-05 | End: 2020-09-25

## 2019-12-05 RX ORDER — METOPROLOL TARTRATE 25 MG/1
12.5 TABLET, FILM COATED ORAL 2 TIMES DAILY
COMMUNITY
Start: 2019-12-05 | End: 2023-08-19

## 2019-12-05 ASSESSMENT — PATIENT HEALTH QUESTIONNAIRE - PHQ9
SUM OF ALL RESPONSES TO PHQ QUESTIONS 1-9: 9
5. POOR APPETITE OR OVEREATING: NEARLY EVERY DAY

## 2019-12-05 ASSESSMENT — ANXIETY QUESTIONNAIRES
6. BECOMING EASILY ANNOYED OR IRRITABLE: NOT AT ALL
5. BEING SO RESTLESS THAT IT IS HARD TO SIT STILL: NOT AT ALL
3. WORRYING TOO MUCH ABOUT DIFFERENT THINGS: NOT AT ALL
1. FEELING NERVOUS, ANXIOUS, OR ON EDGE: SEVERAL DAYS
2. NOT BEING ABLE TO STOP OR CONTROL WORRYING: SEVERAL DAYS
GAD7 TOTAL SCORE: 5
7. FEELING AFRAID AS IF SOMETHING AWFUL MIGHT HAPPEN: NOT AT ALL

## 2019-12-05 ASSESSMENT — MIFFLIN-ST. JEOR: SCORE: 1316.06

## 2019-12-05 ASSESSMENT — PAIN SCALES - GENERAL: PAINLEVEL: EXTREME PAIN (8)

## 2019-12-05 NOTE — PATIENT INSTRUCTIONS
Referral to PHYSICAL THERAPY and OT - Call one number to schedule at any of the above locations: [phone].    [facility] will send me paperwork for the hospital bed.    Referral for hearing testing.      Refill medications.  Labs today.

## 2019-12-05 NOTE — TELEPHONE ENCOUNTER
Please call  Patients ILS worker - [employer] - [place]  [phone]  -   Patient needs appointment with the following:    PHYSICAL THERAPY  OT  Hearing testing.    Give ILS worker the # for scheduling.  Referral to PHYSICAL THERAPY and OT - Call one number to schedule at any of the above locations: [phone].   hearing testing at [facility] -  You can call [phone]    Thanks,PSK

## 2019-12-05 NOTE — PROGRESS NOTES
CURRENT SYMPTOMS:    Yellow / orange mucus   Cough  Sinus pain  Headache   No fever    Patient believes he has a sinus infection. Patient requesting an antibiotic be sent with out an appointment. Subjective     Faheem Jeff is a 64 year old male who presents to clinic today for the following health issues:    HPI     Concern - Need referral for medical bed  Hard to sleep at night - sleeping on air mattress but painful. Unable to prop on pillows - causes pain in neck. Ideally sleep at a 30-45 degree angle but stiffness if unable to move during night, frequently repositions but difficult to do on own due to pain and decreased strength.   Shoulder pain with attempting to lay down.  Back pain when flat.    History GERD - no current symptoms.      He is able to adequately operate bed controls on his own.       Diabetes Follow-up      How often are you checking your blood sugar? Not at all, haven't checked blood sugar in a while    What concerns do you have today about your diabetes? None     Do you have any of these symptoms? (Select all that apply)  Numbness in feet, Burning in feet and Blurry vision     Have you had a diabetic eye exam in the last 12 months? No    BP Readings from Last 2 Encounters:   12/05/19 136/70   03/25/19 135/75     Hemoglobin A1C (%)   Date Value   02/28/2019 6.9 (H)   08/02/2018 6.6 (H)     LDL Cholesterol Calculated (mg/dL)   Date Value   08/02/2018 180 (H)     LDL Cholesterol Direct (mg/dL)   Date Value   02/28/2019 195 (H)       Diabetes Management Resources    Hyperlipidemia Follow-Up      Are you regularly taking any medication or supplement to lower your cholesterol?   Yes- atorvastatin     Are you having muscle aches or other side effects that you think could be caused by your cholesterol lowering medication?  No    Hypertension Follow-up      Do you check your blood pressure regularly outside of the clinic? No     Are you following a low salt diet? No    Are your blood pressures ever more than 140 on the top number (systolic) OR more   than 90 on the bottom number (diastolic), for example 140/90? No, does not monitor BP outside of clinic visits     Depression and Anxiety  Follow-Up    How are you doing with your depression since your last visit? Worsened- so much trouble of arthritis and winter is here    How are you doing with your anxiety since your last visit?  No change    Are you having other symptoms that might be associated with depression or anxiety? No    Have you had a significant life event? No     Do you have any concerns with your use of alcohol or other drugs? No    Social History     Tobacco Use     Smoking status: Current Every Day Smoker     Smokeless tobacco: Never Used   Substance Use Topics     Alcohol use: No     Drug use: No     PHQ 8/3/2018 2/28/2019 12/5/2019   PHQ-9 Total Score 9 17 9   Q9: Thoughts of better off dead/self-harm past 2 weeks Not at all Not at all Not at all     LANE-7 SCORE 8/3/2018 2/28/2019 12/5/2019   Total Score 12 13 5           Suicide Assessment Five-step Evaluation and Treatment (SAFE-T)      How many servings of fruits and vegetables do you eat daily?  0-1    On average, how many sweetened beverages do you drink each day (Examples: soda, juice, sweet tea, etc.  Do NOT count diet or artificially sweetened beverages)?   0    How many days per week do you miss taking your medication? 0    Concern - Need referral for hearing aid  Has had hearing aids previously.  Does not have them now and difficulty with communication.  Isolation     Concern - Need referral for OT/PT that is closer to home. Current PT location is too far - neck and back pain.  Has had PHYSICAL THERAPY and OT in past.  History of closed head injury in past.            BP Readings from Last 3 Encounters:   12/05/19 136/70   03/25/19 135/75   03/11/19 160/74    Wt Readings from Last 3 Encounters:   12/05/19 62.5 kg (137 lb 12.8 oz)   03/25/19 62.1 kg (137 lb)   02/28/19 62.1 kg (137 lb)                    Reviewed and updated as needed this visit by Provider  Tobacco  Allergies  Meds  Med Hx  Surg Hx  Fam Hx  Soc Hx        Review of Systems   ROS COMP: Constitutional,  "HEENT, cardiovascular, pulmonary, gi and gu systems are negative, except as otherwise noted.      Objective    /70 (BP Location: Left arm, Patient Position: Chair, Cuff Size: Adult Regular)   Pulse 70   Temp 97.9  F (36.6  C) (Oral)   Resp 24   Ht 1.626 m (5' 4\")   Wt 62.5 kg (137 lb 12.8 oz)   SpO2 99%   BMI 23.65 kg/m    Body mass index is 23.65 kg/m .  Physical Exam   GENERAL: alert, no distress and fatigued  EYES: Eyes grossly normal to inspection, PERRL and conjunctivae and sclerae normal  HENT: ear canals and TM's normal, nose and mouth without ulcers or lesions  NECK: no adenopathy, no asymmetry, masses, or scars and thyroid normal to palpation  RESP: lungs clear to auscultation - no rales, rhonchi or wheezes  CV: regular rate and rhythm, normal S1 S2, no S3 or S4, no murmur, click or rub, no peripheral edema and peripheral pulses strong  MS: tenderness to palpation over the paravertebral muscles lumbar and cervical spine.    NEURO: Normal strength and tone, sensory exam grossly normal, mentation intact, speech normal, gait abnormal: antalgic/unsteady and hard of hearing.  PSYCH: mentation appears normal, affect flat, fatigued and appearance disheveled    Diagnostic Test Results:  Labs reviewed in Epic  Results for orders placed or performed in visit on 12/05/19   Lipid panel reflex to direct LDL Fasting     Status: Abnormal   Result Value Ref Range    Cholesterol 290 (H) <200 mg/dL    Triglycerides 141 <150 mg/dL    HDL Cholesterol 59 >39 mg/dL    LDL Cholesterol Calculated 203 (H) <100 mg/dL    Non HDL Cholesterol 231 (H) <130 mg/dL   HEMOGLOBIN A1C     Status: Abnormal   Result Value Ref Range    Hemoglobin A1C 6.8 (H) 0 - 5.6 %   Comprehensive metabolic panel     Status: Abnormal   Result Value Ref Range    Sodium 141 133 - 144 mmol/L    Potassium 4.6 3.4 - 5.3 mmol/L    Chloride 105 94 - 109 mmol/L    Carbon Dioxide 31 20 - 32 mmol/L    Anion Gap 5 3 - 14 mmol/L    Glucose 125 (H) 70 - 99 " mg/dL    Urea Nitrogen 22 7 - 30 mg/dL    Creatinine 0.86 0.66 - 1.25 mg/dL    GFR Estimate 90 >60 mL/min/[1.73_m2]    GFR Estimate If Black >90 >60 mL/min/[1.73_m2]    Calcium 9.5 8.5 - 10.1 mg/dL    Bilirubin Total 0.2 0.2 - 1.3 mg/dL    Albumin 3.7 3.4 - 5.0 g/dL    Protein Total 7.4 6.8 - 8.8 g/dL    Alkaline Phosphatase 85 40 - 150 U/L    ALT 20 0 - 70 U/L    AST 11 0 - 45 U/L   CBC with platelets     Status: None   Result Value Ref Range    WBC 5.7 4.0 - 11.0 10e9/L    RBC Count 4.60 4.4 - 5.9 10e12/L    Hemoglobin 13.6 13.3 - 17.7 g/dL    Hematocrit 40.2 40.0 - 53.0 %    MCV 87 78 - 100 fl    MCH 29.6 26.5 - 33.0 pg    MCHC 33.8 31.5 - 36.5 g/dL    RDW 13.9 10.0 - 15.0 %    Platelet Count 323 150 - 450 10e9/L           Assessment & Plan     1. Type 2 diabetes mellitus without complication, with long-term current use of insulin (H)  Controlled.  Continue current metformin dosing.   - Lipid panel reflex to direct LDL Fasting  - HEMOGLOBIN A1C  - atorvastatin (LIPITOR) 40 MG tablet; Take 1 tablet (40 mg) by mouth daily  Dispense: 90 tablet; Refill: 3  - Albumin Random Urine Quantitative with Creat Ratio; Future    2. Hyperlipidemia, unspecified hyperlipidemia type  Controlled.  Continue.    - atorvastatin (LIPITOR) 40 MG tablet; Take 1 tablet (40 mg) by mouth daily  Dispense: 90 tablet; Refill: 3    3. Essential hypertension  Controlled.  Continue lopressor and norvasc.  - Comprehensive metabolic panel  - CBC with platelets  - metoprolol tartrate (LOPRESSOR) 25 MG tablet; Take 0.5 tablets (12.5 mg) by mouth 2 times daily    4. Gastroesophageal reflux disease, esophagitis presence not specified  Monitor - continue prn treatment.  - calcium carbonate (OS-LORIN) 1500 (600 Ca) MG tablet; Take 1 tablet (600 mg) by mouth 2 times daily (with meals)  Dispense: 60 tablet; Refill: 3    5. Traumatic brain injury with loss of consciousness, subsequent encounter  6. Cervical radiculopathy  7. Lumbar radicular pain  Therapy and  hospital bed.    - order for DME; Equipment being ordered: Hospital Bed  Dispense: 1 each; Refill: 0  - [facility] PT, HAND, AND CHIROPRACTIC REFERRAL; Future  - OCCUPATIONAL THERAPY REFERRAL; Future    8. EDWINA (obstructive sleep apnea)  9. 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  Care with sleep medicine.    10. Decreased hearing of both ears  Evaluation planned.  - AUDIOLOGY ADULT REFERRAL     Tobacco Cessation:   reports that he has been smoking. He has never used smokeless tobacco.  Tobacco Cessation Action Plan: Information offered: Patient not interested at this time        Patient Instructions   Referral to PHYSICAL THERAPY and OT - Call one number to schedule at any of the above locations: [phone].    [facility] will send me paperwork for the hospital bed.    Referral for hearing testing.      Refill medications.  Labs today.            Return in about 2 weeks (around 12/19/2019) for wellness exam.    Dinorah Correa MD  [facility]

## 2019-12-05 NOTE — LETTER
[address]  [address]  [phone]    December 10, 2019      Faheem Jeff  [address]  [place] [address]      Dear Faheem,    Attached you will find a copy of your recent laboratory report.   Your cholesterol is higher than previous.  I would recommend use of the Lipitor as we discussed in the office.  A refill was sent in at the time of your visit.  Follow up labs are recommended in 3-4 months.   Your kidney and liver testing is normal.   Your blood cell counts are normal.   Long term blood sugar testing indicates good control.  I would recommend you continue on the same medications as previous.   Please call or MyChart message me if you have any questions.     Sincerely,         Dinorah Correa MD

## 2019-12-06 ASSESSMENT — ANXIETY QUESTIONNAIRES: GAD7 TOTAL SCORE: 5

## 2019-12-09 ENCOUNTER — THERAPY VISIT (OUTPATIENT)
Dept: PHYSICAL THERAPY | Facility: CLINIC | Age: 64
End: 2019-12-09
Attending: FAMILY MEDICINE
Payer: MEDICARE

## 2019-12-09 DIAGNOSIS — M54.12 CERVICAL RADICULOPATHY: ICD-10-CM

## 2019-12-09 DIAGNOSIS — M54.16 LUMBAR RADICULAR PAIN: ICD-10-CM

## 2019-12-09 DIAGNOSIS — M54.2 NECK PAIN: ICD-10-CM

## 2019-12-09 PROCEDURE — 97162 PT EVAL MOD COMPLEX 30 MIN: CPT | Mod: GP | Performed by: PHYSICAL THERAPIST

## 2019-12-09 PROCEDURE — 97110 THERAPEUTIC EXERCISES: CPT | Mod: GP | Performed by: PHYSICAL THERAPIST

## 2019-12-09 NOTE — TELEPHONE ENCOUNTER
This writer attempted to contact 1 on 12/09/19      Reason for call RELAY MESSAGE and left message.      If Patient's ILS calls back:   Relay message, (read verbatim), document that pt called and close encounter        [id number], MEDICAL ASSISTANT

## 2019-12-09 NOTE — LETTER
"DEPARTMENT OF HEALTH AND HUMAN SERVICES  [facility]ENTERS FOR MEDICARE & MEDICAID SERVICES    PLAN/UPDATED PLAN OF PROGRESS FOR OUTPATIENT REHABILITATION    PATIENTS NAME:  Faheem Jeff   : 1955    PROVIDER NUMBER:    [id number]    HICN:  [id number]     PROVIDER NAME: [facility] Located within [facility] PHYSICAL [facility]    MEDICAL RECORD NUMBER: [id number]     START OF CARE DATE:  SOC Date: 19   TYPE:  PT    PRIMARY/TREATMENT DIAGNOSIS: (Pertinent Medical Diagnosis)     Cervical radiculopathy  Lumbar radicular pain  Neck pain    VISITS FROM START OF CARE:  Rxs Used: 1     [facility] Initial Evaluation  Subjective:  The history is provided by the patient. No  was used.   Faheem Jeff being seen for lumbar pain secondary to \"degenerative disc disease.\" Also, coming in for neck pain secondary to a \"pinched nerve.\" Reports he has stenosis in his neck. States that he is not active and spends most of his time lying down because sitting and standing for long periods of time are painful. Pain has been around for many years and nothing seems to make it better. Has been to multiple pain specialists without improvement. Rides a scooter almost everywhere with the occasional use of a cane. .   Problem began 2019. Problem occurred: gradual onset.  and reported as 9/10 on pain scale. General health as reported by patient is fair. Pertinent medical history includes:  High blood pressure, heart problems, osteoarthritis, numbness/tingling, diabetes, cancer, history of fractures, sleep disorder/apnea and smoking.  Medical allergies: other. Other medical allergies details: Meclazine.  Surgeries include:  Other. Other surgery history details: Bladder resections.  Current medications: Cardiac medication, sleep medication and high blood pressure medication. Primary job tasks include:  Prolonged standing, prolonged sitting, lifting/carrying, pushing/pulling and repetitive " "tasks.  Pain is described as sharp and shooting and is intermittent. Pain is worse during the day. Since onset symptoms are gradually worsening. Special tests:  X-ray (Imaging revealed DJD in spine and stenosis in neck). Previous treatment includes physical therapy and chiropractic. There was mild improvement following previous treatment. Patient is Disabled/Previously a .   Barriers include:  None as reported by patient.  Red flags:  Calf pain-swelling-warmth and chest pain (Calf pain manifests as cramping in the middle of the night. No current chest pain but has had pain in the past. ).  Type of problem:  Lumbar  Condition occurred with:  Degenerative joint disease and repetition/overuse. This is a chronic condition    Patient reports pain:  Central lumbar spine, lumbar spine right and lumbar spine left. Radiates to:  Lower leg right and lower leg left. Associated symptoms:  Loss of balance, loss of motion, loss of motion/stiffness and loss of strength (Pt reports \"shooting\" pain into the lower extremities but denies tingling, numbness, and burning sensations into either leg). Symptoms are exacerbated by walking, sitting, certain positions, standing, lifting, stress, lying down, twisting and bending and relieved by rest.    Objective:  Gait:  Pt rides a scooter mostly to get around. States that he does not walk much anymore because he is afraid of the pain. Has a SEC that is available for use around the house or for short distances.  Gait Type:  Antalgic   Weight Bearing Status:  WBAT   Assistive Devices:  Cane       Lumbar/SI Evaluation  AROM Lumbar:   Flexion:            To mid-shin, no pain. Pt states this is a relieving position  PATIENTS NAME:  Faheem Jeff   : 1955      Ext:                    10%, increased pain   Side Bend:        Left:  To upper thigh, painful    Right:  To mid thigh, no pain  Rotation:           Left:  Painful with rotation    Right:  Painful with rotation  Side Glide:      "   Left:     Right:   Lumbar Myotomes:    T12-L3 (Hip Flex):  Left: 4+    Right: 4+  L2-4 (Quads):  Left:  5      L4 (Ankle DF):  Left:  4+    Right:  4+  L5 (Great Toe Ext): Left: 5    Right: 5   S1 (Toe Raise):  Left: 4+    Right: 4+  Lumbar DTR's:  not assessed  Cord Signs:  not assessed  Lumbar Dermtomes:  normal (Pt able to sense light touch in bilateral LEs in all dermatomes)  Lumbar Palpation:    Tenderness present at Left:    Quadratus Lumborum; Piriformis; Gluteus Medius and Greater Trochanter  Tenderness not present at Left:    Erector Spinae or Hamstrings  Tenderness present at Right: Quadratus Lumborum; Piriformis; Gluteus Medius and Greater Trochanter  Tenderness not present at Right:  Erector Spinae or Hamstrings  Functional Tests:  Core strength and proprioception lumbar: Sit to stand: Pt fatigues after completing 5 from  standard chair height.  Lumbar Provocation:    Left negative with:  PROM hip  Right negative with:  PROM hip    Assessment/Plan:    Patient is a 64 year old male with cervical and lumbar complaints.    Patient has the following significant findings with corresponding treatment plan.                Diagnosis 1:  Neck pain, needs further evaluation  Pain -  hot/cold therapy, US, electric stimulation, manual therapy, self management, education and home program  Decreased ROM/flexibility - manual therapy, therapeutic exercise and home program  Decreased joint mobility - manual therapy and therapeutic exercise  Decreased strength - therapeutic exercise, therapeutic activities and home program  Impaired muscle performance - neuro re-education  Decreased function - therapeutic activities and home program  Impaired posture - neuro re-education and home program  Diagnosis 2:  Low back pain consistent with stenosis and generalized weakness   Pain -  hot/cold therapy, US, electric stimulation, mechanical traction, manual therapy, self management, education, directional preference exercise and  home program  Decreased ROM/flexibility - manual therapy, therapeutic exercise and home program  Decreased joint mobility - manual therapy, therapeutic exercise and home program  Decreased strength - therapeutic exercise, therapeutic activities and home program  Impaired gait - gait training and home program  Impaired muscle performance - neuro re-education and home program  Decreased function - therapeutic activities and home program  Impaired posture - neuro re-education and home program    Therapy Evaluation Codes:   1) History comprised of:   Personal factors that impact the plan of care:      Age, Coping style, Past/current experiences, Time since onset of symptoms and Work status.    Comorbidity factors that impact the plan of care are:      Cancer, Chest pain, Pain at night/rest, Smoking and Weakness.     Medications impacting care: None.  2) Examination of Body Systems comprised of:   Body structures and functions that impact the plan of care:      Cervical spine and Lumbar spine.   Activity limitations that impact the plan of care are:      Bathing, Bending, Driving, Dressing, Lifting, Reading/Computer work, Sitting, Squatting/kneeling, Stairs, Standing, Walking, Working, Sleeping and Laying down.  3) Clinical presentation characteristics are:   Stable/Uncomplicated.  PATIENTS NAME:  Faheem Jeff   : 1955        4) Decision-Making    Moderate complexity using standardized patient assessment instrument and/or measureable assessment of functional outcome.  Cumulative Therapy Evaluation is: Moderate complexity.  Previous and current functional limitations:  (See Goal Flow Sheet for this information)    Short term and Long term goals: (See Goal Flow Sheet for this information)   Communication ability:  Patient appears to be able to clearly communicate and understand verbal and written communication and follow directions correctly.  Treatment Explanation - The following has been discussed with the  "patient:   RX ordered/plan of care  Anticipated outcomes  Possible risks and side effects  This patient would benefit from PT intervention to resume normal activities.   Rehab potential is fair.    Frequency:  1 X week, once daily  Duration:  for 8 weeks  Discharge Plan:  Achieve all LTG.  Independent in home treatment program.  Reach maximal therapeutic benefit.      Caregiver Signature/Credentials _____________________________ Date ________      Treating Provider: Corina Mullins PT   I have reviewed and certified the need for these services and plan of treatment while under my care.        PHYSICIAN'S SIGNATURE:   _________________________________________  Date___________         Dinorah Correa MD    Certification period:  Beginning of Cert date period: 12/09/19 to  End of Cert period date: 03/07/20     Functional Level Progress Report: Please see attached \"Goal Flow sheet for Functional level.\"    ____X____ Continue Services or       ________ DC Services                Service dates: From  SOC Date: 12/09/19 date to present                         "

## 2019-12-10 ENCOUNTER — TELEPHONE (OUTPATIENT)
Dept: FAMILY MEDICINE | Facility: CLINIC | Age: 64
End: 2019-12-10

## 2019-12-10 NOTE — TELEPHONE ENCOUNTER
This writer attempted to contact 1 on 12/10/19      Reason for call per message  and left message.      If patient calls back:   Relay message , (read verbatim), document that pt called and close encounter        [id number], MEDICAL ASSISTANT

## 2019-12-10 NOTE — RESULT ENCOUNTER NOTE
Please print letter and attach results.  PSK        Attached you will find a copy of your recent laboratory report.  Your cholesterol is higher than previous.  I would recommend use of the Lipitor as we discussed in the office.  A refill was sent in at the time of your visit.  Follow up labs are recommended in 3-4 months.  Your kidney and liver testing is normal.  Your blood cell counts are normal.  Long term blood sugar testing indicates good control.  I would recommend you continue on the same medications as previous.   Please call or MyChart message me if you have any questions.    Sincerely,         Dinorah Correa MD

## 2019-12-16 NOTE — TELEPHONE ENCOUNTER
Patient went to PHYSICAL THERAPY but was physically unable to do the visit  He is having pain issues and it cannot be tolerated     Can you call him to advise  [phone]

## 2019-12-16 NOTE — TELEPHONE ENCOUNTER
Provider to address the update on the PT visit.    Sarah Hobson RN, [facility]

## 2019-12-17 NOTE — TELEPHONE ENCOUNTER
"Called and realyed message to patient. He stated that magnesium is on his list of medication but he is not currently taking it. Patient is requesting for it to be prescribed so he does not have to pay out of pocket for it.     Patient is also saying that he is trying to get better but he is struggling to get around due to the high amount of pain that he is is. Faheem is requesting medication to be prescribed for the pain so he can continue on with his PT. \"I am even willing to take a drug test.\"     Patient is also scheduled for a physical on 01/08/2020.     Juliann Henry RN    "

## 2019-12-17 NOTE — TELEPHONE ENCOUNTER
Please call to get more information on this -  If he is having worse pain than his normal - should follow up in office.    Was to follow up with me for physical this week or next week anyway.   Has he gotten the hospital bed?        TALIBK

## 2019-12-17 NOTE — TELEPHONE ENCOUNTER
He has magnesium oxide on his list of meds already.  He is ok to continue this if already taking it or to start if not taking.   Continue the exercises at home with gradual increase back to PHYSICAL THERAPY with PHYSICAL THERAPIST is recommended.    MARIE

## 2019-12-17 NOTE — TELEPHONE ENCOUNTER
Patient states that PT was very hard on him. He wants to continue with it but he is struggling with the pain from it.     States that he is also doing the therapy that was provided to him by PT to attempt at home. He is trying to go at it very slowly. Patient is looking for pain medication that could help him continue with PT.     OT provider told the patient to start taking magnesium as well. aFheem would like to know what his provider thinks of this.     [facility] ([facility] living [facility]) is coming this afternoon and the patient would like to wait for her to get there to schedule and apt with Dr. Correa.     Patient has no received his bed yet, but will request for the ILS to follow up with it when she comes this afternoon.     Routing to provider to please review.     Juliann Henry RN

## 2019-12-19 ENCOUNTER — TELEPHONE (OUTPATIENT)
Dept: FAMILY MEDICINE | Facility: CLINIC | Age: 64
End: 2019-12-19

## 2020-01-08 ENCOUNTER — OFFICE VISIT (OUTPATIENT)
Dept: FAMILY MEDICINE | Facility: CLINIC | Age: 65
End: 2020-01-08
Payer: MEDICARE

## 2020-01-08 VITALS
WEIGHT: 135 LBS | SYSTOLIC BLOOD PRESSURE: 130 MMHG | OXYGEN SATURATION: 98 % | DIASTOLIC BLOOD PRESSURE: 72 MMHG | TEMPERATURE: 98 F | HEIGHT: 64 IN | BODY MASS INDEX: 23.05 KG/M2 | HEART RATE: 67 BPM

## 2020-01-08 DIAGNOSIS — I10 ESSENTIAL HYPERTENSION: ICD-10-CM

## 2020-01-08 DIAGNOSIS — Z00.00 ROUTINE GENERAL MEDICAL EXAMINATION AT A HEALTH CARE FACILITY: Primary | ICD-10-CM

## 2020-01-08 DIAGNOSIS — M54.12 CERVICAL RADICULOPATHY: ICD-10-CM

## 2020-01-08 DIAGNOSIS — Z12.5 SCREENING FOR PROSTATE CANCER: ICD-10-CM

## 2020-01-08 DIAGNOSIS — S06.9X9D TRAUMATIC BRAIN INJURY WITH LOSS OF CONSCIOUSNESS, SUBSEQUENT ENCOUNTER: ICD-10-CM

## 2020-01-08 DIAGNOSIS — M54.16 LUMBAR RADICULAR PAIN: ICD-10-CM

## 2020-01-08 DIAGNOSIS — Z11.59 NEED FOR HEPATITIS C SCREENING TEST: ICD-10-CM

## 2020-01-08 LAB
CREAT UR-MCNC: 28 MG/DL
MICROALBUMIN UR-MCNC: <5 MG/L
MICROALBUMIN/CREAT UR: NORMAL MG/G CR (ref 0–17)
PSA SERPL-ACNC: 1.35 UG/L (ref 0–4)

## 2020-01-08 PROCEDURE — G0472 HEP C SCREEN HIGH RISK/OTHER: HCPCS | Performed by: FAMILY MEDICINE

## 2020-01-08 PROCEDURE — G0438 PPPS, INITIAL VISIT: HCPCS | Performed by: FAMILY MEDICINE

## 2020-01-08 PROCEDURE — G0103 PSA SCREENING: HCPCS | Performed by: FAMILY MEDICINE

## 2020-01-08 PROCEDURE — 36415 COLL VENOUS BLD VENIPUNCTURE: CPT | Performed by: FAMILY MEDICINE

## 2020-01-08 PROCEDURE — 99213 OFFICE O/P EST LOW 20 MIN: CPT | Mod: 25 | Performed by: FAMILY MEDICINE

## 2020-01-08 PROCEDURE — 82043 UR ALBUMIN QUANTITATIVE: CPT | Performed by: FAMILY MEDICINE

## 2020-01-08 RX ORDER — CALCIUM CARBONATE/VITAMIN D3 500-10/5ML
1 LIQUID (ML) ORAL DAILY
Qty: 90 CAPSULE | Refills: 3 | Status: SHIPPED | OUTPATIENT
Start: 2020-01-08

## 2020-01-08 RX ORDER — MULTIPLE VITAMINS W/ MINERALS TAB 9MG-400MCG
1 TAB ORAL DAILY
Qty: 90 TABLET | Refills: 3 | Status: SHIPPED | OUTPATIENT
Start: 2020-01-08

## 2020-01-08 RX ORDER — GABAPENTIN 300 MG/1
900 CAPSULE ORAL AT BEDTIME
Qty: 90 CAPSULE | Refills: 1 | COMMUNITY
Start: 2020-01-08 | End: 2020-09-25

## 2020-01-08 ASSESSMENT — MIFFLIN-ST. JEOR: SCORE: 1303.36

## 2020-01-08 NOTE — PATIENT INSTRUCTIONS
I will send a prescription for magnesium and a multivitamin. If it is not covered by insurance, unfortunately, you will have to pay out of pocket. [employer] may have coupons you can use.     I have ordered a referral for the pain clinic. They should contact you to schedule a consult. I advise following up with them to explore further options to manage your pain.    Flu shot today.    Blood and urine labs today.    We will attempt to get the records from your colonoscopy.  I will let you know if you are due for another one.        Preventive Health Recommendations:     See your health care provider every year to    Review health changes.     Discuss preventive care.      Review your medicines if your doctor has prescribed any.      Talk with your health care provider about whether you should have a test to screen for prostate cancer (PSA).    Every 3 years, have a diabetes test (fasting glucose). If you are at risk for diabetes, you should have this test more often.    Every 5 years, have a cholesterol test. Have this test more often if you are at risk for high cholesterol or heart disease.     Every 10 years, have a colonoscopy. Or, have a yearly FIT test (stool test). These exams will check for colon cancer.    Talk to with your health care provider about screening for Abdominal Aortic Aneurysm if you have a family history of AAA or have a history of smoking.    Shots:     Get a flu shot each year.     Get a tetanus shot every 10 years.     Talk to your doctor about your pneumonia vaccines. There are now two you should receive - Pneumovax (PPSV 23) and Prevnar (PCV 13).     Talk to your pharmacist about a shingles vaccine.     Talk to your doctor about the hepatitis B vaccine.  Nutrition:     Eat at least 5 servings of fruits and vegetables each day.     Eat whole-grain bread, whole-wheat pasta and brown rice instead of white grains and rice.     Get adequate Calcium and Vitamin D.   Lifestyle    Exercise for at  least 150 minutes a week (30 minutes a day, 5 days a week). This will help you control your weight and prevent disease.     Limit alcohol to one drink per day.     No smoking.     Wear sunscreen to prevent skin cancer.    See your dentist every six months for an exam and cleaning.    See your eye doctor every 1 to 2 years to screen for conditions such as glaucoma, macular degeneration, cataracts, etc.    Personalized Prevention Plan  You are due for the preventive services outlined below.  Your care team is available to assist you in scheduling these services.  If you have already completed any of these items, please share that information with your care team to update in your medical record.  Health Maintenance Due   Topic Date Due     Diabetic Foot Exam  1955     Discuss Advance Care Planning  1955     Eye Exam  1955     Colonoscopy  05/24/1963     Zoster (Shingles) Vaccine (2 of 3) 11/26/2013     Diptheria Tetanus Pertussis (DTAP/TDAP/TD) Vaccine (3 - Td) 09/05/2016     Annual Wellness Visit  05/24/2018     FALL RISK ASSESSMENT  05/24/2018     Pneumococcal Vaccine (1 of 2 - PCV13) 05/24/2018     AORTIC ANEURYSM SCREENING (SYSTEM ASSIGNED)  05/24/2018     Kidney Microalbumin Urine Test  08/02/2019     Flu Vaccine (1) 09/01/2019     PHQ-2  01/01/2020

## 2020-01-08 NOTE — PROGRESS NOTES
"  SUBJECTIVE:   Faheem Jeff is a 64 year old male who presents for Preventive Visit.      Are you in the first 12 months of your Medicare Part B coverage?  No    Physical Health:    In general, how would you rate your overall physical health? Poor    Outside of work, how many days during the week do you exercise? none    Outside of work, approximately how many minutes a day do you exercise?not applicable    If you drink alcohol do you typically have >3 drinks per day or >7 drinks per week? No    Do you usually eat at least 4 servings of fruit and vegetables a day, include whole grains & fiber and avoid regularly eating high fat or \"junk\" foods? Yes    Do you have any problems taking medications regularly?  No    Do you have any side effects from medications? none    Needs assistance for the following daily activities: transportation and preparing meals    Which of the following safety concerns are present in your home?  none identified     Hearing impairment: Yes, Difficulty understanding soft or whispered speech.    In the past 6 months, have you been bothered by leaking of urine? no    Mental Health:    In general, how would you rate your overall mental or emotional health? fair  PHQ-2 Score:      Do you feel safe in your environment? Yes    Have you ever done Advance Care Planning? (For example, a Health Directive, POLST, or a discussion with a medical provider or your loved ones about your wishes): Yes, advance care planning is on file.    Additional concerns to address?  YES, Physical therapy concerns and pain management     Fall risk:  Fallen 2 or more times in the past year?: Yes  Any fall with injury in the past year?: No  Timed Up and Go Test (>13.5 is fall risk; contact physician) : 10    Cognitive Screenin) Repeat 3 items (Leader, Season, Table)    2) Clock draw: Declined  3) 3 item recall: Declined   Results: Declined  History of traumatic brain injury, poor memory chronic.    Mini-CogTM " Copyright REKHA Mortensen. Licensed by the author for use in [facility]; reprinted with permission (melany@[facility]). All rights reserved.      Do you have sleep apnea, excessive snoring or daytime drowsiness?: yes          HPI    Pain - chronic pain back/neck. Reports not tolerating the PHYSICAL THERAPY exercises.  Patient presents with his . He would like further support in managing pain at multiple sites including shoulders and back. He is currently not taking pain medication for this. He has gone to physical therapy before but these activities caused more discomfort for him so he stopped the exercises.  He notes he is not interested in any pain injections, stating he has done them in the past.  He recalls multiple falls last year that he does not believe he has recovered from. He uses a scooter for additional support.  Has been seen at pain clinics in the past.  Was seen at  Pain Management but missed appointments there and has not rescheduled.  Was also seen at [facility] pain clinic.        Sleep  He follows with a sleep doctor. He is not using a cpap machine. Lost it when he was homeless.  They are trying to get him a new machine.  He does not always compliant with Lunesta. He is waiting for his hospital bed to be delivered.     Health Maintenance  Labs were completed in December 2019.   He reports he had a colonoscopy completed with Dr. Wilbur Silvestre within the last 10 years he believes but uncertain date.  Release of information signed for obtaining records .  Willing to receive influenza vaccination.      Additional Notes  Patient brought in medication list from sleep doctor visit at [facility]  in order to update his med list today.    He notes magnesium was prescribed by his occupational therapist, but insurance is no longer covering the cost. He also notes insurance will not cover cost of multivitamin.    Reviewed and updated as needed this visit by clinical staff  Tobacco  Allergies  Meds   Med Hx  Surg Hx  Fam Hx  Soc Hx        Reviewed and updated as needed this visit by Provider  Tobacco  Allergies  Meds  Med Hx  Surg Hx  Fam Hx  Soc Hx       Social History     Tobacco Use     Smoking status: Current Every Day Smoker     Smokeless tobacco: Never Used   Substance Use Topics     Alcohol use: No                           Current providers sharing in care for this patient include:   Patient Care Team:  Clinic, [facility] as PCP - Dinorah Hayden MD as Assigned PCP    The following health maintenance items are reviewed in Epic and correct as of today:  Health Maintenance   Topic Date Due     DIABETIC FOOT EXAM  1955     ADVANCE CARE PLANNING  1955     EYE EXAM  1955     COLONOSCOPY  05/24/1963     ZOSTER IMMUNIZATION (2 of 3) 11/26/2013     DTAP/TDAP/TD IMMUNIZATION (3 - Td) 09/05/2016     MEDICARE ANNUAL WELLNESS VISIT  05/24/2018     FALL RISK ASSESSMENT  05/24/2018     PNEUMOCOCCAL IMMUNIZATION 65+ LOW/MEDIUM RISK (1 of 2 - PCV13) 05/24/2018     AORTIC ANEURYSM SCREENING (SYSTEM ASSIGNED)  05/24/2018     MICROALBUMIN  08/02/2019     INFLUENZA VACCINE (1) 09/01/2019     PHQ-2  01/01/2020     A1C  06/05/2020     BMP  12/05/2020     LIPID  12/05/2020     TSH W/FREE T4 REFLEX  02/28/2021     HEPATITIS C SCREENING  Completed     IPV IMMUNIZATION  Aged Out     MENINGITIS IMMUNIZATION  Aged Out     BP Readings from Last 3 Encounters:   01/08/20 130/72   12/05/19 136/70   03/25/19 135/75    Wt Readings from Last 3 Encounters:   01/08/20 61.2 kg (135 lb)   12/05/19 62.5 kg (137 lb 12.8 oz)   03/25/19 62.1 kg (137 lb)            Pneumonia Vaccine:obtain with next visit    ROS:  Constitutional, HEENT, cardiovascular, pulmonary, GI, , musculoskeletal, neuro, skin, endocrine and psych systems are negative, except as otherwise noted.    This document serves as a record of the services and decisions personally performed and made by Dinorah Correa MD. It was created on  "her behalf by Pam Arias, a trained medical scribe. The creation of this document is based on the provider's statements to the medical scribe.  Pam Arias 10:01 AM 1/8/2020      OBJECTIVE:   /72 (BP Location: Right arm, Patient Position: Chair, Cuff Size: Adult Regular)   Pulse 67   Temp 98  F (36.7  C) (Oral)   Ht 1.626 m (5' 4\")   Wt 61.2 kg (135 lb)   SpO2 98%   BMI 23.17 kg/m   Estimated body mass index is 23.17 kg/m  as calculated from the following:    Height as of this encounter: 1.626 m (5' 4\").    Weight as of this encounter: 61.2 kg (135 lb).  EXAM:   GENERAL: alert and no distress  EYES: Eyes grossly normal to inspection, PERRL and conjunctivae and sclerae normal  HENT: ear canals and TM's normal, nose and mouth without ulcers or lesions  NECK: no adenopathy, no asymmetry, masses, or scars and thyroid normal to palpation  RESP: lungs clear to auscultation - no rales, rhonchi or wheezes  CV: regular rate and rhythm, normal S1 S2, no S3 or S4, no murmur, click or rub, no peripheral edema and peripheral pulses strong  ABDOMEN: soft, nontender, no hepatosplenomegaly, no masses and bowel sounds normal  MS: tenderness to palpation paravertebral muscles cervical and lumbar spine.  Reacts to light touch on UE and LE bilaterally with complaints of pain.    SKIN: no suspicious lesions or rashes  NEURO: Normal strength and tone, sensory exam grossly normal, abnormal mental status - memory limited on questioning, oriented times 3, speech normal and cranial nerves 2-12 intact  PSYCH: concentration poor and affect normal/bright  LYMPH: no cervical, supraclavicular, axillary, or inguinal adenopathy  Diabetic foot exam: normal DP and PT pulses, no trophic changes or ulcerative lesions, normal sensory exam and normal monofilament exam    Diagnostic Test Results:  Labs reviewed in Epic  Results for orders placed or performed in visit on 01/08/20 (from the past 24 hour(s))   Albumin Random Urine " "Quantitative with Creat Ratio   Result Value Ref Range    Creatinine Urine 28 mg/dL    Albumin Urine mg/L <5 mg/L    Albumin Urine mg/g Cr Unable to calculate due to low value 0 - 17 mg/g Cr   PSA, screen   Result Value Ref Range    PSA 1.35 0 - 4 ug/L         ASSESSMENT / PLAN:   1. Routine general medical examination at a health care facility  Screening and preventative care discussed.   - magnesium oxide 400 MG CAPS; Take 1 capsule by mouth daily  Dispense: 90 capsule; Refill: 3  - multivitamin w/minerals (THERA-VIT-M) tablet; Take 1 tablet by mouth daily  Dispense: 90 tablet; Refill: 3    2. Cervical radiculopathy  Ongoing pain.  Updated dosing on gabapentin recorded.    - gabapentin (NEURONTIN) 300 MG capsule; Take 3 capsules (900 mg) by mouth At Bedtime  Dispense: 90 capsule; Refill: 1  - PAIN MANAGEMENT REFERRAL    3. Essential hypertension  Controlled.   - Albumin Random Urine Quantitative with Creat Ratio    4. Screening for prostate cancer  Normal screening  - PSA, screen    5. Lumbar radicular pain  Referral for ongoing managment  - PAIN MANAGEMENT REFERRAL    6. Traumatic brain injury with loss of consciousness, subsequent encounter  Complicating management - referral to pain clinic.  Has assistance to maintain his appointments, etc.  - PAIN MANAGEMENT REFERRAL    COUNSELING:  Reviewed preventive health counseling, as reflected in patient instructions  Special attention given to:       Immunizations       Colon cancer screening    Estimated body mass index is 23.65 kg/m  as calculated from the following:    Height as of 12/5/19: 1.626 m (5' 4\").    Weight as of 12/5/19: 62.5 kg (137 lb 12.8 oz).         reports that he has been smoking. He has never used smokeless tobacco.  Tobacco Cessation Action Plan: Information offered: Patient not interested at this time    Appropriate preventive services were discussed with this patient, including applicable screening as appropriate for cardiovascular disease, " diabetes, osteopenia/osteoporosis, and glaucoma.  As appropriate for age/gender, discussed screening for colorectal cancer, prostate cancer, breast cancer, and cervical cancer. Checklist reviewing preventive services available has been given to the patient.    Reviewed patients plan of care and provided an AVS. The Basic Care Plan (routine screening as documented in Health Maintenance) for Faheem meets the Care Plan requirement. This Care Plan has been established and reviewed with the Patient.    Counseling Resources:  ATP IV Guidelines  Pooled Cohorts Equation Calculator  Breast Cancer Risk Calculator  FRAX Risk Assessment  ICSI Preventive Guidelines  Dietary Guidelines for Americans, 2010  USDA's MyPlate  ASA Prophylaxis  Lung CA Screening    The information in this document, created by the medical scribe, Pam Arias, for me, accurately reflects the services I personally performed and the decisions made by me. I have reviewed and approved this document for accuracy prior to leaving the patient care area.    Dinorah Correa MD  [facility]

## 2020-01-08 NOTE — LETTER
[address]  [address]  [phone]    January 20, 2020      Faheem Jeff  [address]  [place] [address]      Dear Faheem,    Attached you will find a copy of your recent laboratory report.   Your testing for hepatitis C screening is negative/normal.   Prostate cancer screening is negative/normal.   Your urine testing is normal.  There is not elevated protein present.   Please call or MyChart message me if you have any questions.     Sincerely,         Dinorah Correa MD

## 2020-01-14 LAB — HCV AB SERPL QL IA: NONREACTIVE

## 2020-01-16 ENCOUNTER — TELEPHONE (OUTPATIENT)
Dept: PALLIATIVE MEDICINE | Facility: CLINIC | Age: 65
End: 2020-01-16

## 2020-01-16 NOTE — TELEPHONE ENCOUNTER
AN to schedule 60 minute return. Refer to Uma's note below and ask pt if he wants to schedule appt or if we should contact ILS worker to schedule(consent to communicate on file).    Leana MEADOWS    [facility]

## 2020-01-16 NOTE — TELEPHONE ENCOUNTER
Order received for a New Eval.    Are there any red flags that may impact the assessment or management of the patient? Mental Illness / Communication Difficulties (explain): TBI history and Patient has already been evaluated/treated at a pain clinic: has been seen at multiple pain clinics in past. Most recently - [facility] In 2019. Currently has assistance with scheduling and maintaining appointments through ILS worker.    Pt is still established in Pain Management Program. Last seen 3/25/2019.    Routing to Uma to review if she wants pt scheduled as a new or return and for 30 or 60 minutes.    Leana MEADOWS    [facility]

## 2020-01-16 NOTE — TELEPHONE ENCOUNTER
I would like an hour with him and would prefer his ILS worker/ or another support person to be there for the appt due to his TBI and memory issues.      Plan of care will likely be the same and will not include opiate pain medications. I had referred him to Addiction Med for consideration of Suboxone. Please ask if he ever got the lumbar MRI that was ordered last March and if so, where was it done. It is not in the [facility] system .     Please inform him of the above when scheduling.     DAVID Drummond, NP-C  [facility]

## 2020-01-19 NOTE — RESULT ENCOUNTER NOTE
Please print letter and attach results.  PSK      Attached you will find a copy of your recent laboratory report.  Your testing for hepatitis C screening is negative/normal.  Prostate cancer screening is negative/normal.  Your urine testing is normal.  There is not elevated protein present.  Please call or MyChart message me if you have any questions.    Sincerely,         Dinorah Correa MD

## 2020-01-21 ENCOUNTER — TELEPHONE (OUTPATIENT)
Dept: FAMILY MEDICINE | Facility: CLINIC | Age: 65
End: 2020-01-21

## 2020-01-21 NOTE — TELEPHONE ENCOUNTER
Reason for Call:  Other Referral    Detailed comments: Pt's ILS Worker  calling to follow up if there was referral sent to pain clinic for Pt and would like a call back    Phone Number ILS Worker can be reached at: Other phone number:  [phone]    Best Time: anytime    Can we leave a detailed message on this number? YES    Call taken on 1/21/2020 at 2:03 PM by Sanodr Norwood

## 2020-01-21 NOTE — TELEPHONE ENCOUNTER
Per review of chart, there is a pain management referral placed on 1/8/2020.    Returned call to patient ILS workerLeigh Ann.   Gave scheduling information of  Pain Management Center at [phone]. Instructed patient or worker to call to set up, referral is in chart.  Call back to office with any further questions.    Bonny Regalado RN  [facility]/ [facility]

## 2020-01-21 NOTE — TELEPHONE ENCOUNTER
Reason for call:  Order   Order or referral being requested: Hearing Aids   Reason for request: Patient is partially deaf   Date needed: as soon as possible  Has the patient been seen by the PCP for this problem? YES    Additional comments: Would like to get hearing aids.     Phone number to reach patient:  Home number on file [phone] (home)    Best Time:  Any    Can we leave a detailed message on this number?  YES'

## 2020-01-21 NOTE — TELEPHONE ENCOUNTER
"Returned call to Faheem.  Per review of chart, a referral to Audiology for eval and treat due to hearing loss,  is already in patient chart, was given by Dr. Correa at time of last OV on 12/5/19.  Patient stated he was not aware of this referral, would appreciate scheduling information.  Scheduling # for Audiology at [facility] given ([phone]).  Patient agreed, will have his \"helper\" call to set this up.   No further questions or concerns at this time.    Bonny Regalado RN  [facility]/ [facility]      "

## 2020-01-27 ENCOUNTER — OFFICE VISIT (OUTPATIENT)
Dept: AUDIOLOGY | Facility: CLINIC | Age: 65
End: 2020-01-27
Attending: FAMILY MEDICINE
Payer: MEDICARE

## 2020-01-27 DIAGNOSIS — H90.6 MIXED HEARING LOSS, BILATERAL: Primary | ICD-10-CM

## 2020-01-27 PROCEDURE — 99207 ZZC NO CHARGE LOS: CPT | Performed by: AUDIOLOGIST

## 2020-01-27 PROCEDURE — 92557 COMPREHENSIVE HEARING TEST: CPT | Performed by: AUDIOLOGIST

## 2020-01-27 PROCEDURE — 92550 TYMPANOMETRY & REFLEX THRESH: CPT | Performed by: AUDIOLOGIST

## 2020-01-27 NOTE — PROGRESS NOTES
AUDIOLOGY REPORT    SUBJECTIVE:  Faheem Jeff is a 64 year old male who was seen in the Audiology Clinic at the [facility] for audiologic evaluation, referred by Dinorah Correa M.D.  The patient reports a long standing hearing loss and use of hearing aids. The patient denies  bilateral tinnitus, bilateral otalgia, bilateral drainage and bilateral aural fullness but reports a history of noise exposure..  The patient notes difficulty with communication in a variety of listening situations.  They were accompanied today by their self.    OBJECTIVE:  Abuse Screening:  Do you feel unsafe at home or work/school? No  Do you feel threatened by someone? No  Does anyone try to keep you from having contact with others, or doing things outside of your home? No  Physical signs of abuse present? No     Otoscopic exam indicates Right ear partially occluded with cerumen, left ear clear     Pure Tone Thresholds assessed using conventional audiometry with good  reliability from 250-8000 Hz bilaterally using insert earphones and circumaural headphones     RIGHT:  moderate-severe sloping to profound mixed hearing loss (loss is mostly sensorineural with an air bone gap at 250-500 and 4000 Hz    LEFT:  moderate-severe  mixed hearing loss (loss is mostly sensorineural with an air bone gap at 250-500 Hz    Tympanogram:    RIGHT: normal eardrum mobility    LEFT:   normal eardrum mobility    Reflexes (reported by stimulus ear):  RIGHT: Ipsilateral is absent at frequencies tested  RIGHT: Contralateral is absent at frequencies tested  LEFT:   Ipsilateral is absent at frequencies tested  LEFT:   Contralateral is absent at frequencies tested      Speech Reception Threshold:    RIGHT: 65 dB HL    LEFT:   55 dB HL    Speech Reception Thresholds are in good agreement with pure tone thresholds.    Word Recognition Score:     RIGHT: 0% at 100 dB HL and 12% at 85 dB HLusing NU-6 recorded word list.    LEFT:   84% at 95 dB HL using  NU-6 recorded word list.      ASSESSMENT:     ICD-10-CM    1. Mixed hearing loss, bilateral H90.6        Today s results were discussed with the patient in detail.     PLAN:  Patient was counseled regarding hearing loss and impact on communication.  Patient is a good candidate for amplification at this time.  It is recommended that the patient schedule an ENT consultation for:    1) Asymmetrical hearing loss with very poor word recognition on the right side  2) Cerumen removal  3)  Medical clearance for hearing aid use    Following ENT appointment a hearing aid consultation is recommended.      Please call this clinic with questions regarding these results or recommendations.      Jono Frances MA, CCC-A  [place] Licensed Audiologist #[id number]  1/27/2020    CC: Dinorah Correa M.D

## 2020-01-30 ENCOUNTER — OFFICE VISIT (OUTPATIENT)
Dept: PALLIATIVE MEDICINE | Facility: CLINIC | Age: 65
End: 2020-01-30
Payer: MEDICARE

## 2020-01-30 VITALS — SYSTOLIC BLOOD PRESSURE: 147 MMHG | DIASTOLIC BLOOD PRESSURE: 56 MMHG | HEART RATE: 68 BPM | OXYGEN SATURATION: 99 %

## 2020-01-30 DIAGNOSIS — M50.30 DDD (DEGENERATIVE DISC DISEASE), CERVICAL: ICD-10-CM

## 2020-01-30 DIAGNOSIS — M54.16 LUMBAR RADICULAR PAIN: Primary | ICD-10-CM

## 2020-01-30 DIAGNOSIS — M51.369 DDD (DEGENERATIVE DISC DISEASE), LUMBAR: ICD-10-CM

## 2020-01-30 PROCEDURE — 99214 OFFICE O/P EST MOD 30 MIN: CPT | Performed by: NURSE PRACTITIONER

## 2020-01-30 ASSESSMENT — PAIN SCALES - GENERAL: PAINLEVEL: WORST PAIN (10)

## 2020-01-30 NOTE — PATIENT INSTRUCTIONS
After Visit Instructions:     Thank you for coming to [facility] for your care. It is my goal to partner with you to help you reach your optimal state of health.     Continue daily self-care, identifying contributing factors, and monitoring variations in pain level. Continue to integrate self-care into your life.      1. Recommend physical therapy  2. Recommend seeing Addiction Medicine clinic to discuss Suboxone which is a pain medication   3. Recommend MRI of lumbar spine  4. Follow up as needed with Pain Center if you have MRI and want to work with us.         DAVID Drummond, NP-C  [facility]  [facility]    Clinic Number:  [phone]     Call with any questions about your care and for scheduling assistance.     Calls are returned Monday through Friday between 8 AM and 4:30 PM. We usually get back to you within 2 business days depending on the issue/request.    If we are prescribing your medications:    For opioid medication refills, call the clinic or send a [employer] message 7 days in advance.  Please include:    Name of requested medication    Name of the pharmacy.    For non-opioid medications, call your pharmacy directly to request a refill. Please allow 3-4 days to be processed.     Per [place] State Law:    All controlled substance prescriptions must be filled within 30 days of being written.      For those controlled substances allowing refills, pickup must occur within 30 days of last fill.      We believe regular attendance is key to your success in our program!      Any time you are unable to keep your appointment we ask that you call us at least 24 hours in advance to cancel.This will allow us to offer the appointment time to another patient.   Multiple missed appointments may lead to dismissal from the clinic.

## 2020-01-30 NOTE — PROGRESS NOTES
[facility]    Patient is being re-referred after 10 month absence from this clinic:   1/8/2020: Reason for Referral: Evaluation for comprehensive services  Do you have any specific questions for the pain specialist? No  Are there any red flags that may impact the assessment or management of the patient? Mental Illness / Communication Difficulties (explain): TBI history and Patient has already been evaluated/treated at a pain clinic: has been seen at multiple pain clinics in past.  Most recently - [facility]  In 2019.  Currently has assistance with scheduling and maintaining appointments through [employer] worker.  What is your diagnosis for the patient's pain? DDD lumbar, DDD cervical, TBI     CHIEF COMPLAINT: Widespread pain    INTERVAL HISTORY:  Last seen on 3/25/19.        Recommendations/plan at the last visit included:  1.         Keep physical therapy assessment appt on 4/11/19 at 12:30 pm  2.         Schedule follow-up with DAVID Mcadams NP-C in 4 weeks after you've had the MRI.  3.         Imaging: MRI of lumbar spine. Order given to take to [facility].   4.         Procedures recommended: We will talk about this after your MRI   5.         Referrals: Addiction Medicine. They will call you to schedule.   6.         Please have Max Moran ([employer] worker) send paperwork regarding scooter. Ok for him to call since you signed the release to communicate. If Max can come to your appts with Uma, this may be helpful.   7. Medication recommendations:             1. No change to current medications.     Since last visit:   -Continues to have significant pain throughout his body.  -He chose not to follow-up on having a MRI or seeing addiction medicine as discussed before.    Pain Information:   Pain quality: No descriptor given   Pain rating: intensity ranges from 7/10 to 10/10, and averages 8/10 on a 0-10 scale.      Annual Controlled Substance Agreement/UDS due date:  N/A    Current Pain Relevant Medications:    Gabapentin 300 mg TID  Celebrex 200 mg every day  Ritalin 10 mg daily  Tylenol: Up to 400 mg daily      Previous Pain Relevant Medications: (H--helped; HI--Helped initially; [facility]--Somewhat helpful; NH--No help; W--worse; SE--side effects; ?--Unsure if helpful)   NOTE: This medication information taken from patient's intake form, not medical records.               Opiates: Codeine:H, Hydrocodone:H, Methadone:NH, Morphine:N, Oxycodone:H, Tramadol:NH              NSAIDS: Celebrex:NH, Ibuprofen:[facility], Relafen:NH              Muscle Relaxants: Flexeril:H              Anti-migraine mediations: none noted              Anti-depressants: Amitriptyline:NH, Nortriptyline:NH              Sleep aids:none noted              Anxiolytics: none noted              Neuropathics: Gabapentin:H                Topicals: none noted              Other medications not covered above: none noted.      Any illicit drug use: Hx of polysubstance abuse, cocaine,unsure of last use   EtOH use: 9/5/06, last use   Caffeine use: 1-2 per day.   Nicotine use: 1 PPD   Any use of prescriptions other than how they were prescribed:denies         THE 4 As OF OPIOID MAINTENANCE ANALGESIA    Analgesia: Is pain relief clinically significant? N/A   Activity: Is patient functional and able to perform Activities of Daily Living? N/A   Adverse effects: Is patient free from adverse side effects from opiates? N/A   Adherence to Rx protocol: Is patient adhering to Controlled Substance Agreement and taking medications ONLY as ordered? N/A     Is Narcan prescribed for opiate use >50 MME daily? N/A     [place] Board of Pharmacy Data Base Reviewed:    YES; multiple controlled substances including opiates, stimulants     Medications:  Current Outpatient Medications   Medication Sig Dispense Refill     amLODIPine (NORVASC) 10 MG tablet Take 1 tablet (10 mg) by mouth daily 30 tablet 1     aspirin 81 MG chewable tablet CHEW AND  SWALLOW 1 TABLET(81 MG) BY MOUTH DAILY 36 tablet 0     atorvastatin (LIPITOR) 40 MG tablet Take 1 tablet (40 mg) by mouth daily 90 tablet 3     calcium carbonate (OS-LORIN) 1500 (600 Ca) MG tablet Take 1 tablet (600 mg) by mouth 2 times daily (with meals) 60 tablet 3     gabapentin (NEURONTIN) 300 MG capsule Take 3 capsules (900 mg) by mouth At Bedtime 90 capsule 1     magnesium oxide 400 MG CAPS Take 1 capsule by mouth daily 90 capsule 3     metFORMIN (GLUCOPHAGE) 500 MG tablet TAKE 3 TABLETS BY MOUTH IN THE MORNING AND 2 TABLETS IN THE EVENING 450 tablet 1     methylphenidate (RITALIN) 10 MG tablet Take 10 mg by mouth       metoprolol tartrate (LOPRESSOR) 25 MG tablet Take 0.5 tablets (12.5 mg) by mouth 2 times daily       multivitamin w/minerals (THERA-VIT-M) tablet Take 1 tablet by mouth daily 90 tablet 3     order for DME Equipment being ordered: Hospital Bed 1 each 0     order for DME Equipment being ordered: Glucometer per insurance preference, lancets, test strips.  Patient to check sugars 4 times daily, 90 day supply for test strips and lancets, refill 3 times 1 Device 0       Review of Systems: A 10-point review of systems was negative, with the exception of chronic pain issues.      PHYSICAL EXAM    Vitals:    01/30/20 1413   BP: (!) 147/56   Pulse: 68   SpO2: 99%       Constitutional: healthy, alert and mild emotional distress  HEENT: Head atraumatic, normocephalic. Eyes without conjunctival injection or jaundice. Neck supple. No obvious neck masses.  Cardiovascular: No edema or JVD appreciated. Peripheral pulses are palpable, no edema on bilateral lower extremities  Skin: No rash, lesions, or petechiae of exposed skin.   Extremities: No clubbing, cyanosis, or edema to exposed extremities  Psychiatric/mental status: Alert, without lethargy or stupor.. Mood: Upset about discussion of his polysubstance use as well as his perception that he is being asked to do more than he is able to do.  Poor historian due  to pre-existing TBI  Neurologic exam:  CN:  Cranial nerves 2-12 are grossly normal.  Motor:  2/5 UE and 2/5 LE strength     Musculoskeletal exam:  Cervical spine:             Flex:  10 degrees, painful              Ext: 10 degrees, painful              Rotation to right: 10 degrees, painful              Rotation to left: 10 degrees, painful              Tenderness in the cervical spine at midline. Yes             Tenderness in the cervical paraspinal muscles. Yes  Thoracic spine:              Kyphosis. Yes             Tenderness in the thoracic spine at midline. Yes             Tenderness in the thoracic paraspinal muscles. Yes  Lumbar/Sacral spine:             Forward Flexion:  refused             Ext: Refused              Rotation/ext to right: Refused             Rotation/ext to left: Refused             Lordosis. No             Tenderness in the lumbar spine at midline. Yes             Tenderness in the lumbar paraspinal muscles.Yes        DIRE Score for ongoing opioid management is calculated as follows:    Diagnosis = 3 pts (advanced condition; severe pain/objective findings)    Intractability = 1 pt (few therapies tried; passive patient role)    Risk        Psych = 2 pts (personality dysfunction/mental illness that moderately interferes with care)         Chem Hlth = 2 pts (use of medications to cope with stress; chemical dependency in remission)       Reliability = 2 pts (occasional difficulties with compliance; generally reliable)       Social = 2 pts (reduction in some relationships/life rolls)       (Psych + Chem hlth + Reliability + Social) = 12    Efficacy = 2 pts (moderate benefit/function; low med dose; too early/not tried meds)    DIRE Score = 14        7-13: likely NOT suitable candidate for long-term opioid analgesia       14-21: may be a suitable candidate for long-term opioid analgesia        Previous Diagnostic Tests:   Imaging Studies:                ASSESSMENT:   1.  DDD of lumbar  spine.   2.  Recent fracture of left femur  3.  Bilateral knee osteoarthritis   4.  Hx: traumatic brain injury 2006  5.  Hx: Polysubstance abuse in remission, current tobacco use    Faheem presents after 10-month absence from the pain clinic.  We talked briefly about the last time that he was seen and the recommendations at that time.  He began yelling about everybody making him go all over town and do things and he cannot do that because he has so much pain.  He was advised to lower his voice which he did.  We talked about my needing more information in the form of an MRI before I could recommend any treatment.  We also discussed that given his history of polysubstance drug abuse I cannot prescribe opiate pain medication for him which is why I had recommended that he see the addiction medicine team to consider Suboxone.  He again escalated raising his voice repeating that I wanted him to run all over town and do things he is unable to do.    I told him that my recommendations remain the same as they did previously, physical therapy, a lumbar MRI assessment with the addiction medicine team.  He stated that he is not interested in any of these things.  I told him that he is welcome back to the pain clinic if he would like to.     Hypertension: Faheem to follow up with Primary Care provider regarding elevated blood pressure.   Tobacco use: Advised complete tobacco cessation.     Plan:    Diagnosis reviewed, treatment option addressed, and risk/benifits discussed.  Self-care instructions given.  I am recommending a multidisciplinary treatment plan to help this patient better manage pain.      1. Recommend physical therapy  2. Recommend seeing Addiction Medicine clinic to discuss Suboxone which is a pain medication   3. Recommend MRI of lumbar spine  4. Follow up as needed with Pain Center if you have MRI and want to work with us.       A total of 30 minutes was spent on the patient today, greater than 50% of that time  was spent on face to face counseling and care coordination regarding diagnoses and treatment options as mentioned above including the multidisciplinary pain management program and the benefits of physical therapy, pain psychology and medication options.      DAVID Drummond, NP-C  [facility] [place]

## 2020-02-12 ENCOUNTER — TELEPHONE (OUTPATIENT)
Dept: FAMILY MEDICINE | Facility: CLINIC | Age: 65
End: 2020-02-12

## 2020-02-12 ENCOUNTER — TELEPHONE (OUTPATIENT)
Dept: OTOLARYNGOLOGY | Facility: CLINIC | Age: 65
End: 2020-02-12

## 2020-02-12 NOTE — TELEPHONE ENCOUNTER
Reason for call:  Other   Patient called regarding (reason for call): call back  Additional comments: [facility] worker is calling because she wants to discuss the patients last appt and wants to know where they go from here. Please call back     Phone number to reach patient:  Other phone number:  [phone]    Best Time:  any    Can we leave a detailed message on this number?  YES

## 2020-03-11 ENCOUNTER — OFFICE VISIT (OUTPATIENT)
Dept: OTOLARYNGOLOGY | Facility: CLINIC | Age: 65
End: 2020-03-11
Payer: MEDICARE

## 2020-03-11 VITALS
BODY MASS INDEX: 21.68 KG/M2 | HEART RATE: 70 BPM | OXYGEN SATURATION: 98 % | DIASTOLIC BLOOD PRESSURE: 62 MMHG | SYSTOLIC BLOOD PRESSURE: 124 MMHG | RESPIRATION RATE: 16 BRPM | TEMPERATURE: 97.8 F | HEIGHT: 64 IN | WEIGHT: 127 LBS

## 2020-03-11 DIAGNOSIS — H61.21 IMPACTED CERUMEN OF RIGHT EAR: ICD-10-CM

## 2020-03-11 DIAGNOSIS — H90.3 SNHL (SENSORY-NEURAL HEARING LOSS), ASYMMETRICAL: Primary | ICD-10-CM

## 2020-03-11 PROCEDURE — 99203 OFFICE O/P NEW LOW 30 MIN: CPT | Mod: 25 | Performed by: OTOLARYNGOLOGY

## 2020-03-11 PROCEDURE — 69210 REMOVE IMPACTED EAR WAX UNI: CPT | Mod: RT | Performed by: OTOLARYNGOLOGY

## 2020-03-11 ASSESSMENT — PAIN SCALES - GENERAL: PAINLEVEL: NO PAIN (0)

## 2020-03-11 ASSESSMENT — MIFFLIN-ST. JEOR: SCORE: 1267.07

## 2020-03-11 NOTE — LETTER
3/11/2020         RE: Faheem Jeff  [address]  [place] [address]        Dear Colleague,    Thank you for referring your patient, Faheem Jeff, to the [facility]. Please see a copy of my visit note below.    Chief Complaint - Hearing loss    History of Present Illness - Faheem Jeff is a 64 year old male who presents to me today with hearing loss in both ears, much worse in the right.  It has been present and noticeable for approximately many years.  It was not in onset.  The patient has noticed increased difficulty hearing certain sounds and difficulty in understanding others, especially in noisy or crowded situations.  There is no history of recent head trauma, or chronic ear disease or ear surgery.  With regards to recreational, , and work-related noise exposure he has a lot for decades. No family history of hearing loss at a young age.     Past Medical History -   Patient Active Problem List   Diagnosis     Other, mixed, or unspecified nondependent drug abuse, continuous     Tobacco dependence     Traumatic brain injury (H)     EDWINA (obstructive sleep apnea)     Lumbar radicular pain     Insomnia due to medical condition     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     Hyperlipidemia     Headache, occipital     Essential hypertension     GERD (gastroesophageal reflux disease)     Diabetes mellitus, type II (H)     Depression with anxiety     Cocaine dependence, episodic (H)     Chronic pain disorder     Chronic narcotic dependence (H)     Cervical radiculopathy     Adhesive capsulitis of shoulder     Alcoholism in remission (H)     Homeless     Other chronic pain     Neck pain       Current Medications -   Current Outpatient Medications:      amLODIPine (NORVASC) 10 MG tablet, Take 1 tablet (10 mg) by mouth daily, Disp: 30 tablet, Rfl: 1     aspirin 81 MG chewable tablet, CHEW AND SWALLOW 1 TABLET(81 MG) BY MOUTH DAILY, Disp: 36 tablet, Rfl: 0     atorvastatin  "(LIPITOR) 40 MG tablet, Take 1 tablet (40 mg) by mouth daily, Disp: 90 tablet, Rfl: 3     calcium carbonate (OS-LORIN) 1500 (600 Ca) MG tablet, Take 1 tablet (600 mg) by mouth 2 times daily (with meals), Disp: 60 tablet, Rfl: 3     gabapentin (NEURONTIN) 300 MG capsule, Take 3 capsules (900 mg) by mouth At Bedtime, Disp: 90 capsule, Rfl: 1     magnesium oxide 400 MG CAPS, Take 1 capsule by mouth daily, Disp: 90 capsule, Rfl: 3     metFORMIN (GLUCOPHAGE) 500 MG tablet, TAKE 3 TABLETS BY MOUTH IN THE MORNING AND 2 TABLETS IN THE EVENING, Disp: 450 tablet, Rfl: 1     methylphenidate (RITALIN) 10 MG tablet, Take 10 mg by mouth, Disp: , Rfl:      metoprolol tartrate (LOPRESSOR) 25 MG tablet, Take 0.5 tablets (12.5 mg) by mouth 2 times daily, Disp: , Rfl:      multivitamin w/minerals (THERA-VIT-M) tablet, Take 1 tablet by mouth daily, Disp: 90 tablet, Rfl: 3     order for DME, Equipment being ordered: Hospital Bed, Disp: 1 each, Rfl: 0     order for DME, Equipment being ordered: Glucometer per insurance preference, lancets, test strips.  Patient to check sugars 4 times daily, 90 day supply for test strips and lancets, refill 3 times, Disp: 1 Device, Rfl: 0    Allergies -   Allergies   Allergen Reactions     Dust Mite Extract Unknown     Meclizine Other (See Comments)     Described \"feeling funny and burning.\"     Poplar Bud Extract Unknown     Trazodone Derivatives Unknown     \"Puts me in a coma\"       Social History -   Social History     Socioeconomic History     Marital status: Single     Spouse name: Not on file     Number of children: Not on file     Years of education: Not on file     Highest education level: Not on file   Occupational History     Not on file   Social Needs     Financial resource strain: Not on file     Food insecurity:     Worry: Not on file     Inability: Not on file     Transportation needs:     Medical: Not on file     Non-medical: Not on file   Tobacco Use     Smoking status: Current Every Day " "Smoker     Smokeless tobacco: Never Used   Substance and Sexual Activity     Alcohol use: No     Drug use: No     Sexual activity: Not Currently     Partners: Female   Lifestyle     Physical activity:     Days per week: Not on file     Minutes per session: Not on file     Stress: Not on file   Relationships     Social connections:     Talks on phone: Not on file     Gets together: Not on file     Attends Synagogue service: Not on file     Active member of club or organization: Not on file     Attends meetings of clubs or organizations: Not on file     Relationship status: Not on file     Intimate partner violence:     Fear of current or ex partner: Not on file     Emotionally abused: Not on file     Physically abused: Not on file     Forced sexual activity: Not on file   Other Topics Concern     Not on file   Social History Narrative     Not on file       Family History - No family history of hearing loss.    Review of Systems - As per HPI and PMHx, otherwise 7 system review is negative.    Physical Exam  /62   Pulse 70   Temp 97.8  F (36.6  C) (Oral)   Resp 16   Ht 1.626 m (5' 4\")   Wt 57.6 kg (127 lb)   SpO2 98%   BMI 21.80 kg/m    General - The patient is in no distress.  Alert and oriented to person and place, answers questions and cooperates with examination appropriately.   Voice and Breathing - The patient was breathing comfortably without the use of accessory muscles. There was no wheezing, stridor, or stertor.  The patients voice was clear and strong.  Ears - The auricles are normal. The right ear canal was impacted with cerumen. I used the otic microscope witht eh patient supine. I grabbed the large wax plug with an alligatory. I removed all the wax. Left ear was clean. The tympanic membranes are normal in appearance, bony landmarks are intact.  No retraction, perforation, or masses.  No fluid or purulence was seen in the external canal or the middle ear. No evidence of infection of the middle " ear or external canal, cerumen was normal in appearance.  Eyes - Extraocular movements intact.  Sclera were not icteric or injected.  Neck - Soft, non-tender. Palpation of the occipital, submental, submandibular, internal jugular chain, and supraclavicular nodes did not demonstrate any abnormal lymph nodes or masses. Parotid glands had no masses. Palpation of the thyroid was soft and smooth, with no nodules or goiter appreciated.  The trachea was mobile and midline.  Neurological - Cranial nerves 2 through 12 were grossly intact. House-Brackmann grade 1 out of 6 bilaterally.       Audiologic Studies - An audiogram and tympanogram was performed 1/24/2020 as part of the evaluation and personally reviewed. He has moderate sensorineural hearing loss left ear, moderate down-sloping to profound sensorineural hearing loss right ear with extremely poor word recognition scores on the right.      Assessment and Plan - Faheem Jeff is a 64 year old male who presents to me today with sensorineural hearing loss. Has asymmetry of unsure etiology. I recommend MRI brain to r/o retrocochlear pathology. Retest audiogram first as the right ear was impacted. He may need a CROS aid, for sure a hearing aid consult.      Jay Cai MD  Otolaryngology  [facility]        Again, thank you for allowing me to participate in the care of your patient.        Sincerely,        Jay Cai MD

## 2020-03-11 NOTE — PROGRESS NOTES
Chief Complaint - Hearing loss    History of Present Illness - Faheem Jeff is a 64 year old male who presents to me today with hearing loss in both ears, much worse in the right.  It has been present and noticeable for approximately many years.  It was not in onset.  The patient has noticed increased difficulty hearing certain sounds and difficulty in understanding others, especially in noisy or crowded situations.  There is no history of recent head trauma, or chronic ear disease or ear surgery.  With regards to recreational, , and work-related noise exposure he has a lot for decades. No family history of hearing loss at a young age.     Past Medical History -   Patient Active Problem List   Diagnosis     Other, mixed, or unspecified nondependent drug abuse, continuous     Tobacco dependence     Traumatic brain injury (H)     EDWINA (obstructive sleep apnea)     Lumbar radicular pain     Insomnia due to medical condition     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     Hyperlipidemia     Headache, occipital     Essential hypertension     GERD (gastroesophageal reflux disease)     Diabetes mellitus, type II (H)     Depression with anxiety     Cocaine dependence, episodic (H)     Chronic pain disorder     Chronic narcotic dependence (H)     Cervical radiculopathy     Adhesive capsulitis of shoulder     Alcoholism in remission (H)     Homeless     Other chronic pain     Neck pain       Current Medications -   Current Outpatient Medications:      amLODIPine (NORVASC) 10 MG tablet, Take 1 tablet (10 mg) by mouth daily, Disp: 30 tablet, Rfl: 1     aspirin 81 MG chewable tablet, CHEW AND SWALLOW 1 TABLET(81 MG) BY MOUTH DAILY, Disp: 36 tablet, Rfl: 0     atorvastatin (LIPITOR) 40 MG tablet, Take 1 tablet (40 mg) by mouth daily, Disp: 90 tablet, Rfl: 3     calcium carbonate (OS-LORIN) 1500 (600 Ca) MG tablet, Take 1 tablet (600 mg) by mouth 2 times daily (with meals), Disp: 60 tablet, Rfl: 3     gabapentin  "(NEURONTIN) 300 MG capsule, Take 3 capsules (900 mg) by mouth At Bedtime, Disp: 90 capsule, Rfl: 1     magnesium oxide 400 MG CAPS, Take 1 capsule by mouth daily, Disp: 90 capsule, Rfl: 3     metFORMIN (GLUCOPHAGE) 500 MG tablet, TAKE 3 TABLETS BY MOUTH IN THE MORNING AND 2 TABLETS IN THE EVENING, Disp: 450 tablet, Rfl: 1     methylphenidate (RITALIN) 10 MG tablet, Take 10 mg by mouth, Disp: , Rfl:      metoprolol tartrate (LOPRESSOR) 25 MG tablet, Take 0.5 tablets (12.5 mg) by mouth 2 times daily, Disp: , Rfl:      multivitamin w/minerals (THERA-VIT-M) tablet, Take 1 tablet by mouth daily, Disp: 90 tablet, Rfl: 3     order for DME, Equipment being ordered: Hospital Bed, Disp: 1 each, Rfl: 0     order for DME, Equipment being ordered: Glucometer per insurance preference, lancets, test strips.  Patient to check sugars 4 times daily, 90 day supply for test strips and lancets, refill 3 times, Disp: 1 Device, Rfl: 0    Allergies -   Allergies   Allergen Reactions     Dust Mite Extract Unknown     Meclizine Other (See Comments)     Described \"feeling funny and burning.\"     Poplar Bud Extract Unknown     Trazodone Derivatives Unknown     \"Puts me in a coma\"       Social History -   Social History     Socioeconomic History     Marital status: Single     Spouse name: Not on file     Number of children: Not on file     Years of education: Not on file     Highest education level: Not on file   Occupational History     Not on file   Social Needs     Financial resource strain: Not on file     Food insecurity:     Worry: Not on file     Inability: Not on file     Transportation needs:     Medical: Not on file     Non-medical: Not on file   Tobacco Use     Smoking status: Current Every Day Smoker     Smokeless tobacco: Never Used   Substance and Sexual Activity     Alcohol use: No     Drug use: No     Sexual activity: Not Currently     Partners: Female   Lifestyle     Physical activity:     Days per week: Not on file     Minutes " "per session: Not on file     Stress: Not on file   Relationships     Social connections:     Talks on phone: Not on file     Gets together: Not on file     Attends [employer] witness service: Not on file     Active member of club or organization: Not on file     Attends meetings of clubs or organizations: Not on file     Relationship status: Not on file     Intimate partner violence:     Fear of current or ex partner: Not on file     Emotionally abused: Not on file     Physically abused: Not on file     Forced sexual activity: Not on file   Other Topics Concern     Not on file   Social History Narrative     Not on file       Family History - No family history of hearing loss.    Review of Systems - As per HPI and PMHx, otherwise 7 system review is negative.    Physical Exam  /62   Pulse 70   Temp 97.8  F (36.6  C) (Oral)   Resp 16   Ht 1.626 m (5' 4\")   Wt 57.6 kg (127 lb)   SpO2 98%   BMI 21.80 kg/m    General - The patient is in no distress.  Alert and oriented to person and place, answers questions and cooperates with examination appropriately.   Voice and Breathing - The patient was breathing comfortably without the use of accessory muscles. There was no wheezing, stridor, or stertor.  The patients voice was clear and strong.  Ears - The auricles are normal. The right ear canal was impacted with cerumen. I used the otic microscope witht eh patient supine. I grabbed the large wax plug with an alligatory. I removed all the wax. Left ear was clean. The tympanic membranes are normal in appearance, bony landmarks are intact.  No retraction, perforation, or masses.  No fluid or purulence was seen in the external canal or the middle ear. No evidence of infection of the middle ear or external canal, cerumen was normal in appearance.  Eyes - Extraocular movements intact.  Sclera were not icteric or injected.  Neck - Soft, non-tender. Palpation of the occipital, submental, submandibular, internal jugular chain, and " supraclavicular nodes did not demonstrate any abnormal lymph nodes or masses. Parotid glands had no masses. Palpation of the thyroid was soft and smooth, with no nodules or goiter appreciated.  The trachea was mobile and midline.  Neurological - Cranial nerves 2 through 12 were grossly intact. House-Brackmann grade 1 out of 6 bilaterally.       Audiologic Studies - An audiogram and tympanogram was performed 1/24/2020 as part of the evaluation and personally reviewed. He has moderate sensorineural hearing loss left ear, moderate down-sloping to profound sensorineural hearing loss right ear with extremely poor word recognition scores on the right.      Assessment and Plan - Faheem Jeff is a 64 year old male who presents to me today with sensorineural hearing loss. Has asymmetry of unsure etiology. I recommend MRI brain to r/o retrocochlear pathology. Retest audiogram first as the right ear was impacted. He may need a CROS aid, for sure a hearing aid consult.      Jay Cai MD  Otolaryngology  [facility]

## 2020-03-13 PROBLEM — M54.2 NECK PAIN: Status: RESOLVED | Noted: 2019-12-09 | Resolved: 2020-03-13

## 2020-03-24 ENCOUNTER — TELEPHONE (OUTPATIENT)
Dept: AUDIOLOGY | Facility: CLINIC | Age: 65
End: 2020-03-24

## 2020-03-24 NOTE — TELEPHONE ENCOUNTER
I spoke to the patient in regards to the upcoming appointment and offered to reschedule after April 27th if they would prefer due to the Corona virus. They would like to reschedule but could not at this moment. Patient was provided the number to call at a more convenient time.     -Bib Mccall CCC-A

## 2020-05-14 ENCOUNTER — TELEPHONE (OUTPATIENT)
Dept: FAMILY MEDICINE | Facility: CLINIC | Age: 65
End: 2020-05-14

## 2020-05-14 NOTE — TELEPHONE ENCOUNTER
Restart forms for [employer] placed on Dr. Correa 's desk for completion.      Marley BLUNT (R)(MARITZA)  .

## 2020-05-26 ENCOUNTER — TELEPHONE (OUTPATIENT)
Dept: FAMILY MEDICINE | Facility: CLINIC | Age: 65
End: 2020-05-26

## 2020-05-26 NOTE — TELEPHONE ENCOUNTER
.Reason for Call:  Forms fax    Detailed comments: Cassy from [employer] called and stated that she fax over some forms on 05/20 called [employer], Dr. Correa  need to sign off on it. Please fax forms back ASAP on [phone] and also call back if any question at [phone]    Phone Number Patient can be reached at: Other phone number:  [phone]    Best Time: any    Can we leave a detailed message on this number? YES    Call taken on 5/26/2020 at 8:46 AM by [place]

## 2020-05-26 NOTE — TELEPHONE ENCOUNTER
Called back both numbers provided, neither worked.  If they call back, please tell them we did not receive any forms on 5/20/20.    Genet HOOVER, Patient Care

## 2020-05-27 ENCOUNTER — TELEPHONE (OUTPATIENT)
Dept: FAMILY MEDICINE | Facility: CLINIC | Age: 65
End: 2020-05-27

## 2020-05-27 NOTE — TELEPHONE ENCOUNTER
Forms from [employer] placed on Dr. Correa desk for completion.    Leana Singh

## 2020-05-27 NOTE — TELEPHONE ENCOUNTER
Correct fax number: [phone]  Correct phone number: [phone]. Please call [employer] back. Wrong information was provided.

## 2020-05-28 ENCOUNTER — MEDICAL CORRESPONDENCE (OUTPATIENT)
Dept: HEALTH INFORMATION MANAGEMENT | Facility: CLINIC | Age: 65
End: 2020-05-28

## 2020-05-29 ENCOUNTER — OFFICE VISIT (OUTPATIENT)
Dept: OTOLARYNGOLOGY | Facility: CLINIC | Age: 65
End: 2020-05-29
Payer: MEDICARE

## 2020-05-29 VITALS
HEART RATE: 89 BPM | WEIGHT: 127 LBS | BODY MASS INDEX: 21.8 KG/M2 | SYSTOLIC BLOOD PRESSURE: 130 MMHG | DIASTOLIC BLOOD PRESSURE: 68 MMHG

## 2020-05-29 DIAGNOSIS — H90.3 SNHL (SENSORY-NEURAL HEARING LOSS), ASYMMETRICAL: Primary | ICD-10-CM

## 2020-05-29 PROCEDURE — 99214 OFFICE O/P EST MOD 30 MIN: CPT | Performed by: OTOLARYNGOLOGY

## 2020-05-29 ASSESSMENT — PAIN SCALES - GENERAL: PAINLEVEL: NO PAIN (0)

## 2020-05-29 NOTE — PROGRESS NOTES
History of Present Illness - Faheem Jeff is a 65 year old male here to see me for the first time, but has seen both Dr. hdz and Dr. Cai, as recently as 3/11/2020 for his hearing loss.    To review his situation, he has hearing loss in both ears, much worse in the right.  It has been present and noticeable for approximately many years.  It was not sudden in onset.  The patient has noticed increased difficulty hearing certain sounds and difficulty in understanding others, especially in noisy or crowded situations.  There is no history of recent head trauma, or chronic ear disease or ear surgery.  With regards to recreational, , and work-related noise exposure he has a lot for decades. No family history of hearing loss at a young age.     An audiogram and tympanogram were performed 1/24/2020 as part of the evaluation and personally reviewed. He has moderate sensorineural hearing loss left ear, but an asymmetric, much worse down-sloping to profound sensorineural hearing loss right ear with extremely poor word recognition scores on the right.  To rule out retrocochlear pathology, Dr. Cai recommended an MRI, but it was never done.    The patient comes back to me today wanting to ask more questions about his hearing loss.  He tells me that the hearing is still an issue.  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     Hyperlipidemia     Headache, occipital     Essential hypertension     GERD (gastroesophageal reflux disease)     Diabetes mellitus, type II (H)     Depression with anxiety     Cocaine dependence, episodic (H)     Chronic pain  "disorder     Chronic narcotic dependence (H)     Cervical radiculopathy     Adhesive capsulitis of shoulder     Alcoholism in remission (H)     Homeless     Other chronic pain       Current Medications -   Current Outpatient Medications:      amLODIPine (NORVASC) 10 MG tablet, Take 1 tablet (10 mg) by mouth daily, Disp: 30 tablet, Rfl: 1     aspirin 81 MG chewable tablet, CHEW AND SWALLOW 1 TABLET(81 MG) BY MOUTH DAILY, Disp: 36 tablet, Rfl: 0     atorvastatin (LIPITOR) 40 MG tablet, Take 1 tablet (40 mg) by mouth daily, Disp: 90 tablet, Rfl: 3     calcium carbonate (OS-LORIN) 1500 (600 Ca) MG tablet, Take 1 tablet (600 mg) by mouth 2 times daily (with meals), Disp: 60 tablet, Rfl: 3     gabapentin (NEURONTIN) 300 MG capsule, Take 3 capsules (900 mg) by mouth At Bedtime, Disp: 90 capsule, Rfl: 1     magnesium oxide 400 MG CAPS, Take 1 capsule by mouth daily, Disp: 90 capsule, Rfl: 3     metFORMIN (GLUCOPHAGE) 500 MG tablet, TAKE 3 TABLETS BY MOUTH IN THE MORNING AND 2 TABLETS IN THE EVENING, Disp: 450 tablet, Rfl: 1     methylphenidate (RITALIN) 10 MG tablet, Take 10 mg by mouth, Disp: , Rfl:      metoprolol tartrate (LOPRESSOR) 25 MG tablet, Take 0.5 tablets (12.5 mg) by mouth 2 times daily, Disp: , Rfl:      multivitamin w/minerals (THERA-VIT-M) tablet, Take 1 tablet by mouth daily, Disp: 90 tablet, Rfl: 3     order for DME, Equipment being ordered: Hospital Bed, Disp: 1 each, Rfl: 0     order for DME, Equipment being ordered: Glucometer per insurance preference, lancets, test strips.  Patient to check sugars 4 times daily, 90 day supply for test strips and lancets, refill 3 times, Disp: 1 Device, Rfl: 0    Allergies -   Allergies   Allergen Reactions     Dust Mite Extract Unknown     Meclizine Other (See Comments)     Described \"feeling funny and burning.\"     Poplar Bud Extract Unknown     Trazodone Derivatives Unknown     \"Puts me in a coma\"       Social History -   Social History     Socioeconomic History     " Marital status: Single     Spouse name: Not on file     Number of children: Not on file     Years of education: Not on file     Highest education level: Not on file   Occupational History     Not on file   Social Needs     Financial resource strain: Not on file     Food insecurity     Worry: Not on file     Inability: Not on file     Transportation needs     Medical: Not on file     Non-medical: Not on file   Tobacco Use     Smoking status: Current Every Day Smoker     Smokeless tobacco: Never Used   Substance and Sexual Activity     Alcohol use: No     Drug use: No     Sexual activity: Not Currently     Partners: Female   Lifestyle     Physical activity     Days per week: Not on file     Minutes per session: Not on file     Stress: Not on file   Relationships     Social connections     Talks on phone: Not on file     Gets together: Not on file     Attends [employer] service: Not on file     Active member of club or organization: Not on file     Attends meetings of clubs or organizations: Not on file     Relationship status: Not on file     Intimate partner violence     Fear of current or ex partner: Not on file     Emotionally abused: Not on file     Physically abused: Not on file     Forced sexual activity: Not on file   Other Topics Concern     Not on file   Social History Narrative     Not on file       Family History - No family history on file.    Review of Systems - As per HPI and PMHx, otherwise 10+ system review of the head and neck, and general constitution is negative.    Physical Exam  /68   Pulse 89   Wt 57.6 kg (127 lb)   BMI 21.80 kg/m      General - The patient is well nourished and well developed, and appears to have good nutritional status.  Alert and oriented to person and place, answers questions and cooperates with examination appropriately.   Head and Face - Normocephalic and atraumatic, with no gross asymmetry noted of the contour of the facial features.  The facial nerve is intact,  with strong symmetric movements.  Voice and Breathing - The patient was breathing comfortably without the use of accessory muscles. There was no wheezing, stridor, or stertor.  The patients voice was clear and strong, and had appropriate pitch and quality.  Ears - The tympanic membranes are normal in appearance, bony landmarks are intact.  No retraction, perforation, or masses.  No fluid or purulence was seen in the external canal or the middle ear. No evidence of infection of the middle ear or external canal, cerumen was normal in appearance.  Eyes - Extraocular movements intact, and the pupils were reactive to light.  Sclera were not icteric or injected, conjunctiva were pink and moist.  Mouth - Examination of the oral cavity showed pink, healthy oral mucosa. No lesions or ulcerations noted.  The tongue was mobile and midline, and the dentition were in good condition.    Throat - The walls of the oropharynx were smooth, pink, moist, symmetric, and had no lesions or ulcerations.  The tonsillar pillars and soft palate were symmetric.  The uvula was midline on elevation.    Neck - Normal midline excursion of the laryngotracheal complex during swallowing.  Full range of motion on passive movement.  Palpation of the occipital, submental, submandibular, internal jugular chain, and supraclavicular nodes did not demonstrate any abnormal lymph nodes or masses.  The carotid pulse was palpable bilaterally.  Palpation of the thyroid was soft and smooth, with no nodules or goiter appreciated.  The trachea was mobile and midline.  Nose - External contour is symmetric, no gross deflection or scars.  Nasal mucosa is pink and moist with no abnormal mucus.  The septum was midline and non-obstructive, turbinates of normal size and position.  No polyps, masses, or purulence noted on examination.      A/P - Faheem Jeff is a 65 year old male  (H90.5) SNHL (sensory-neural hearing loss), asymmetrical  (primary encounter  diagnosis)    The rest of today's visit was spent discussing the theories behind the cause of assymetric sensorineural hearing loss.  These included the microembolic and viral neuritis theories.  Also were discussed the indication for MRI of the Brain and Internal Auditory Canals.      After speaking about the MRI, given that this has been going on for many years, I think it is reasonable to wait on the MRI.    He has my medical clearance for hearing aids and have given him the information packet.

## 2020-05-29 NOTE — LETTER
5/29/2020         RE: Faheem Jeff  [address]  [place] [address]        Dear Colleague,    Thank you for referring your patient, Faheem Jeff, to the [facility]. Please see a copy of my visit note below.    History of Present Illness - Faheem Jeff is a 65 year old male here to see me for the first time, but has seen both Dr. hdz and Dr. Cai, as recently as 3/11/2020 for his hearing loss.    To review his situation, he has hearing loss in both ears, much worse in the right.  It has been present and noticeable for approximately many years.  It was not sudden in onset.  The patient has noticed increased difficulty hearing certain sounds and difficulty in understanding others, especially in noisy or crowded situations.  There is no history of recent head trauma, or chronic ear disease or ear surgery.  With regards to recreational, , and work-related noise exposure he has a lot for decades. No family history of hearing loss at a young age.     An audiogram and tympanogram were performed 1/24/2020 as part of the evaluation and personally reviewed. He has moderate sensorineural hearing loss left ear, but an asymmetric, much worse down-sloping to profound sensorineural hearing loss right ear with extremely poor word recognition scores on the right.  To rule out retrocochlear pathology, Dr. Cai recommended an MRI, but it was never done.    The patient comes back to me today wanting to ask more questions about his hearing loss.  He tells me that the hearing is still an issue.  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     Hyperlipidemia     Headache, occipital     Essential hypertension     GERD (gastroesophageal reflux disease)     Diabetes mellitus, type II (H)     Depression with anxiety     Cocaine dependence, episodic (H)     Chronic pain disorder     Chronic narcotic dependence (H)     Cervical radiculopathy     Adhesive capsulitis of shoulder     Alcoholism in remission (H)     Homeless     Other chronic pain       Current Medications -   Current Outpatient Medications:      amLODIPine (NORVASC) 10 MG tablet, Take 1 tablet (10 mg) by mouth daily, Disp: 30 tablet, Rfl: 1     aspirin 81 MG chewable tablet, CHEW AND SWALLOW 1 TABLET(81 MG) BY MOUTH DAILY, Disp: 36 tablet, Rfl: 0     atorvastatin (LIPITOR) 40 MG tablet, Take 1 tablet (40 mg) by mouth daily, Disp: 90 tablet, Rfl: 3     calcium carbonate (OS-LORIN) 1500 (600 Ca) MG tablet, Take 1 tablet (600 mg) by mouth 2 times daily (with meals), Disp: 60 tablet, Rfl: 3     gabapentin (NEURONTIN) 300 MG capsule, Take 3 capsules (900 mg) by mouth At Bedtime, Disp: 90 capsule, Rfl: 1     magnesium oxide 400 MG CAPS, Take 1 capsule by mouth daily, Disp: 90 capsule, Rfl: 3     metFORMIN (GLUCOPHAGE) 500 MG tablet, TAKE 3 TABLETS BY MOUTH IN THE MORNING AND 2 TABLETS IN THE EVENING, Disp: 450 tablet, Rfl: 1     methylphenidate (RITALIN) 10 MG tablet, Take 10 mg by mouth, Disp: , Rfl:      metoprolol tartrate (LOPRESSOR) 25 MG tablet, Take 0.5 tablets (12.5 mg) by mouth 2 times daily, Disp: , Rfl:      multivitamin w/minerals (THERA-VIT-M) tablet, Take 1 tablet by mouth daily, Disp: 90 tablet, Rfl: 3     order for DME, Equipment being ordered: Hospital Bed, Disp: 1 each, Rfl: 0     order for DME, Equipment being ordered: Glucometer per insurance preference, lancets, test strips.  Patient to check sugars 4 times daily, 90 day supply for test strips and lancets, refill 3 times, Disp: 1 Device, Rfl: 0    Allergies -   Allergies   Allergen Reactions     Dust Mite Extract  "Unknown     Meclizine Other (See Comments)     Described \"feeling funny and burning.\"     Poplar Bud Extract Unknown     Trazodone Derivatives Unknown     \"Puts me in a coma\"       Social History -   Social History     Socioeconomic History     Marital status: Single     Spouse name: Not on file     Number of children: Not on file     Years of education: Not on file     Highest education level: Not on file   Occupational History     Not on file   Social Needs     Financial resource strain: Not on file     Food insecurity     Worry: Not on file     Inability: Not on file     Transportation needs     Medical: Not on file     Non-medical: Not on file   Tobacco Use     Smoking status: Current Every Day Smoker     Smokeless tobacco: Never Used   Substance and Sexual Activity     Alcohol use: No     Drug use: No     Sexual activity: Not Currently     Partners: Female   Lifestyle     Physical activity     Days per week: Not on file     Minutes per session: Not on file     Stress: Not on file   Relationships     Social connections     Talks on phone: Not on file     Gets together: Not on file     Attends Taoism service: Not on file     Active member of club or organization: Not on file     Attends meetings of clubs or organizations: Not on file     Relationship status: Not on file     Intimate partner violence     Fear of current or ex partner: Not on file     Emotionally abused: Not on file     Physically abused: Not on file     Forced sexual activity: Not on file   Other Topics Concern     Not on file   Social History Narrative     Not on file       Family History - No family history on file.    Review of Systems - As per HPI and PMHx, otherwise 10+ system review of the head and neck, and general constitution is negative.    Physical Exam  /68   Pulse 89   Wt 57.6 kg (127 lb)   BMI 21.80 kg/m      General - The patient is well nourished and well developed, and appears to have good nutritional status.  Alert " and oriented to person and place, answers questions and cooperates with examination appropriately.   Head and Face - Normocephalic and atraumatic, with no gross asymmetry noted of the contour of the facial features.  The facial nerve is intact, with strong symmetric movements.  Voice and Breathing - The patient was breathing comfortably without the use of accessory muscles. There was no wheezing, stridor, or stertor.  The patients voice was clear and strong, and had appropriate pitch and quality.  Ears - The tympanic membranes are normal in appearance, bony landmarks are intact.  No retraction, perforation, or masses.  No fluid or purulence was seen in the external canal or the middle ear. No evidence of infection of the middle ear or external canal, cerumen was normal in appearance.  Eyes - Extraocular movements intact, and the pupils were reactive to light.  Sclera were not icteric or injected, conjunctiva were pink and moist.  Mouth - Examination of the oral cavity showed pink, healthy oral mucosa. No lesions or ulcerations noted.  The tongue was mobile and midline, and the dentition were in good condition.    Throat - The walls of the oropharynx were smooth, pink, moist, symmetric, and had no lesions or ulcerations.  The tonsillar pillars and soft palate were symmetric.  The uvula was midline on elevation.    Neck - Normal midline excursion of the laryngotracheal complex during swallowing.  Full range of motion on passive movement.  Palpation of the occipital, submental, submandibular, internal jugular chain, and supraclavicular nodes did not demonstrate any abnormal lymph nodes or masses.  The carotid pulse was palpable bilaterally.  Palpation of the thyroid was soft and smooth, with no nodules or goiter appreciated.  The trachea was mobile and midline.  Nose - External contour is symmetric, no gross deflection or scars.  Nasal mucosa is pink and moist with no abnormal mucus.  The septum was midline and  non-obstructive, turbinates of normal size and position.  No polyps, masses, or purulence noted on examination.      A/P - Faheem Jeff is a 65 year old male  (H90.5) SNHL (sensory-neural hearing loss), asymmetrical  (primary encounter diagnosis)    The rest of today's visit was spent discussing the theories behind the cause of assymetric sensorineural hearing loss.  These included the microembolic and viral neuritis theories.  Also were discussed the indication for MRI of the Brain and Internal Auditory Canals.      After speaking about the MRI, given that this has been going on for many years, I think it is reasonable to wait on the MRI.    He has my medical clearance for hearing aids and have given him the information packet.    Again, thank you for allowing me to participate in the care of your patient.        Sincerely,        Zack Rogers MD

## 2020-05-29 NOTE — TELEPHONE ENCOUNTER
Yani with [facility] called to follow-up on request for [employer] glucose monitor forms.    Told that forms had been placed on Dr. Correa's desk and that she would be addressing them.

## 2020-06-04 NOTE — TELEPHONE ENCOUNTER
Reason for Call:  Other call back    Detailed comments: [facility] calling about this situation because they haven't heard anything back    Phone Number Patient can be reached at: Other phone number:  [phone]    Best Time: Anytime.    Can we leave a detailed message on this number? YES    Call taken on 6/4/2020 at 1:48 PM by Carla Rivero

## 2020-06-08 NOTE — TELEPHONE ENCOUNTER
Reason for Call:  Other prescription    Detailed comments: [facility] calling to follow up on forms for a blood glucose monitor for Pt and they have not heard back and Pt has been calling them to follow up.  They would like a call back with an update as soon as possible today.  Phone Number [employer] [place] can be reached at: Other phone number:  [phone]    Best Time: anytime    Can we leave a detailed message on this number? YES    Call taken on 6/8/2020 at 9:36 AM by Sandor Norwood

## 2020-07-01 ENCOUNTER — TELEPHONE (OUTPATIENT)
Dept: FAMILY MEDICINE | Facility: CLINIC | Age: 65
End: 2020-07-01

## 2020-07-01 DIAGNOSIS — M54.16 LUMBAR RADICULAR PAIN: Primary | ICD-10-CM

## 2020-07-01 DIAGNOSIS — S06.9X9D TRAUMATIC BRAIN INJURY WITH LOSS OF CONSCIOUSNESS, SUBSEQUENT ENCOUNTER: ICD-10-CM

## 2020-07-01 NOTE — TELEPHONE ENCOUNTER
.Reason for call:  Other   Patient called regarding (reason for call): prescription  Additional comments: pt needs new orders for his scooter. Pt needs updated scripts for this so that his scooter can be worked on. Please fax the new script to [employer] [facility] (fax number) [phone]    Phone number to reach patient:  Home number on file [phone] (home)    Best Time:  anytime    Can we leave a detailed message on this number?  YES    Travel screening: Negative

## 2020-07-17 DIAGNOSIS — K21.9 GASTROESOPHAGEAL REFLUX DISEASE, ESOPHAGITIS PRESENCE NOT SPECIFIED: ICD-10-CM

## 2020-07-21 NOTE — TELEPHONE ENCOUNTER
"    Requested Prescriptions   Pending Prescriptions Disp Refills     calcium carbonate (OS-LORIN) 1500 (600 Ca) MG tablet [Pharmacy Med Name: CALCIUM CARBONATE 600MG TABLETS] 60 tablet 3     Sig: TAKE 1 TABLET BY MOUTH TWICE DAILY WITH MEALS       Vitamin Supplements (Adult) Protocol Passed - 7/21/2020  1:40 PM        Passed - High dose Vitamin D not ordered        Passed - Recent (12 mo) or future (30 days) visit within the authorizing provider's specialty     Patient has had an office visit with the authorizing provider or a provider within the authorizing providers department within the previous 12 mos or has a future within next 30 days. See \"Patient Info\" tab in inbasket, or \"Choose Columns\" in Meds & Orders section of the refill encounter.              Passed - Medication is active on med list           Prescription approved per [facility] [place] Protocol.    Yvon Nguyen RN, BSN, PHN    "

## 2020-07-22 ENCOUNTER — TELEPHONE (OUTPATIENT)
Dept: FAMILY MEDICINE | Facility: CLINIC | Age: 65
End: 2020-07-22

## 2020-07-22 DIAGNOSIS — K21.9 GASTROESOPHAGEAL REFLUX DISEASE, ESOPHAGITIS PRESENCE NOT SPECIFIED: ICD-10-CM

## 2020-07-22 NOTE — TELEPHONE ENCOUNTER
Patient is still waiting for the following medication:     Calcium Carbonate 1500 (600 Ca) MG TAKE 1 TABLET BY MOUTH TWICE DAILY WITH     [facility]   [phone]=Fax  [phone]=Telephone    He can be reached @ [phone]

## 2020-07-22 NOTE — TELEPHONE ENCOUNTER
Different pharmacy being requested for refill than previously sent to.   30 day supply with 3 refills sent on 7/21/20 To [facility] in [place]. Should have refills on file at that pharmacy or patient can transfer prescription if he wishes.        Yvon Nguyen RN, BSN, PHN

## 2020-07-23 NOTE — TELEPHONE ENCOUNTER
Prescription approved per [facility] Refill Protocol.      Yvon Nguyen RN, BSN, PHN

## 2020-07-23 NOTE — TELEPHONE ENCOUNTER
Pt lives in [place] now. He called the pharmacy and they don't have the script on file. [facility] the script [facility] [place] [address]

## 2020-07-27 ENCOUNTER — OFFICE VISIT (OUTPATIENT)
Dept: AUDIOLOGY | Facility: CLINIC | Age: 65
End: 2020-07-27
Payer: MEDICARE

## 2020-07-27 DIAGNOSIS — H90.6 MIXED HEARING LOSS, BILATERAL: Primary | ICD-10-CM

## 2020-07-27 PROCEDURE — V5275 EAR IMPRESSION: HCPCS | Mod: GY | Performed by: AUDIOLOGIST

## 2020-07-27 PROCEDURE — 92590 HC HEARING AID EXAM MONAURAL: CPT | Mod: GY | Performed by: AUDIOLOGIST

## 2020-07-27 PROCEDURE — 99207 ZZC NO CHARGE LOS: CPT | Performed by: AUDIOLOGIST

## 2020-07-27 NOTE — PROGRESS NOTES
AUDIOLOGY REPORT    SUBJECTIVE: Faheem Jeff is a 65 year old male was seen in the Audiology Clinic at [facility] on 7/27/20 to discuss concerns with hearing and functional communication difficulties. Faheem has been seen previously on 1/27/2020, and results revealed a bilateral mixed hearing loss.  The patient was medically evaluated and determined to be cleared for binaural hearing aids by Dr. Rogers. Faheem notes difficulty with communication in a variety of listening situations.    OBJECTIVE:  Patient is a hearing aid candidate. Patient would like to move forward with a hearing aid evaluation today. Therefore, the patient was presented with different options for amplification to help aid in communication. Discussed styles, levels of technology and monaural vs. binaural fitting.     The hearing aid(s) mutually chosen were:  Left: Phonak Virto B70-13 and right ear CROS  COLOR: Tan/Pink  BATTERY SIZE: 13  EARMOLD/TIPS: not applicable  CANAL/ LENGTH: not applicable    Otoscopy revealed ears are clear of cerumen bilaterally. Bilateral earmolds were taken without incident.    ASSESSMENT:   Bilateral mixed hearing loss     Reviewed purchase information and warranty information with patient. The 45 day trial period was explained to patient. The patient was given a copy of the [place] Department of Health consumer brochure on purchasing hearing instruments. Patient risk factors have been provided to the patient in writing prior to the sale of the hearing aid per FDA regulation. The risk factors are also available in the User Instructional Booklet to be presented on the day of the hearing aid fitting. Hearing aid(s) ordered. Hearing aid evaluation completed. Patient was advised to check with their insurance to ascertain of they are eligible for any hearing aid benefits.     PLAN: Faheem is scheduled to return in 2-3 weeks for a hearing aid fitting and programming. Purchase agreement will be  completed on that date. Please contact this clinic with any questions or concerns.      Bib Mccall [facility]  Licensed Audiologist #[id number]  7/27/2020

## 2020-08-23 DIAGNOSIS — Z79.4 TYPE 2 DIABETES MELLITUS WITH COMPLICATION, WITH LONG-TERM CURRENT USE OF INSULIN (H): ICD-10-CM

## 2020-08-23 DIAGNOSIS — E11.8 TYPE 2 DIABETES MELLITUS WITH COMPLICATION, WITH LONG-TERM CURRENT USE OF INSULIN (H): ICD-10-CM

## 2020-08-25 NOTE — TELEPHONE ENCOUNTER
Please schedule patient. Indy refill given.   Needs appointment and labs for further refills.     Sarah Page RN  [facility]

## 2020-09-02 ENCOUNTER — TELEPHONE (OUTPATIENT)
Dept: AUDIOLOGY | Facility: CLINIC | Age: 65
End: 2020-09-02

## 2020-09-16 ENCOUNTER — TELEPHONE (OUTPATIENT)
Dept: AUDIOLOGY | Facility: CLINIC | Age: 65
End: 2020-09-16

## 2020-09-16 NOTE — TELEPHONE ENCOUNTER
I provided Leigh Ann with the total cost from the hearing aid prescription provided to the patient at his last visit.     -Bib Mccall CCC-A

## 2020-09-16 NOTE — TELEPHONE ENCOUNTER
Reason for Call:  Other call back    Detailed comments: sunshine is calling from patients independent living skills to request quote/price list of hearing aids received at previous visit with provider. Patient misplaced previous quote given. Please call to discuss. Thank you.    Phone Number Patient can be reached at: [phone]    Best Time:     Can we leave a detailed message on this number? YES    Call taken on 9/16/2020 at 3:55 PM by Bharti Bullock

## 2020-09-21 ENCOUNTER — OFFICE VISIT (OUTPATIENT)
Dept: FAMILY MEDICINE | Facility: CLINIC | Age: 65
End: 2020-09-21
Payer: MEDICARE

## 2020-09-21 ENCOUNTER — TELEPHONE (OUTPATIENT)
Dept: FAMILY MEDICINE | Facility: CLINIC | Age: 65
End: 2020-09-21

## 2020-09-21 VITALS
HEART RATE: 67 BPM | TEMPERATURE: 97.7 F | BODY MASS INDEX: 21.8 KG/M2 | OXYGEN SATURATION: 98 % | HEIGHT: 64 IN | DIASTOLIC BLOOD PRESSURE: 72 MMHG | SYSTOLIC BLOOD PRESSURE: 136 MMHG

## 2020-09-21 DIAGNOSIS — G47.33 OSA (OBSTRUCTIVE SLEEP APNEA): ICD-10-CM

## 2020-09-21 DIAGNOSIS — E78.5 HYPERLIPIDEMIA, UNSPECIFIED HYPERLIPIDEMIA TYPE: ICD-10-CM

## 2020-09-21 DIAGNOSIS — H91.93 DECREASED HEARING OF BOTH EARS: ICD-10-CM

## 2020-09-21 DIAGNOSIS — I10 ESSENTIAL HYPERTENSION: ICD-10-CM

## 2020-09-21 DIAGNOSIS — E11.9 TYPE 2 DIABETES MELLITUS WITHOUT COMPLICATION, WITHOUT LONG-TERM CURRENT USE OF INSULIN (H): Primary | ICD-10-CM

## 2020-09-21 DIAGNOSIS — M54.12 CERVICAL RADICULOPATHY: ICD-10-CM

## 2020-09-21 DIAGNOSIS — S06.9X9D TRAUMATIC BRAIN INJURY WITH LOSS OF CONSCIOUSNESS, SUBSEQUENT ENCOUNTER: ICD-10-CM

## 2020-09-21 LAB
ALBUMIN SERPL-MCNC: 3.5 G/DL (ref 3.4–5)
ALP SERPL-CCNC: 81 U/L (ref 40–150)
ALT SERPL W P-5'-P-CCNC: 33 U/L (ref 0–70)
ANION GAP SERPL CALCULATED.3IONS-SCNC: 6 MMOL/L (ref 3–14)
AST SERPL W P-5'-P-CCNC: 18 U/L (ref 0–45)
BILIRUB SERPL-MCNC: 0.2 MG/DL (ref 0.2–1.3)
BUN SERPL-MCNC: 24 MG/DL (ref 7–30)
CALCIUM SERPL-MCNC: 9 MG/DL (ref 8.5–10.1)
CHLORIDE SERPL-SCNC: 106 MMOL/L (ref 94–109)
CHOLEST SERPL-MCNC: 218 MG/DL
CO2 SERPL-SCNC: 28 MMOL/L (ref 20–32)
CREAT SERPL-MCNC: 0.88 MG/DL (ref 0.66–1.25)
GFR SERPL CREATININE-BSD FRML MDRD: 89 ML/MIN/{1.73_M2}
GLUCOSE SERPL-MCNC: 118 MG/DL (ref 70–99)
HBA1C MFR BLD: 6.6 % (ref 0–5.6)
HDLC SERPL-MCNC: 61 MG/DL
LDLC SERPL CALC-MCNC: 135 MG/DL
NONHDLC SERPL-MCNC: 157 MG/DL
POTASSIUM SERPL-SCNC: 4.4 MMOL/L (ref 3.4–5.3)
PROT SERPL-MCNC: 7.3 G/DL (ref 6.8–8.8)
SODIUM SERPL-SCNC: 140 MMOL/L (ref 133–144)
TRIGL SERPL-MCNC: 108 MG/DL

## 2020-09-21 PROCEDURE — 80061 LIPID PANEL: CPT | Performed by: FAMILY MEDICINE

## 2020-09-21 PROCEDURE — 99214 OFFICE O/P EST MOD 30 MIN: CPT | Performed by: FAMILY MEDICINE

## 2020-09-21 PROCEDURE — 83036 HEMOGLOBIN GLYCOSYLATED A1C: CPT | Performed by: FAMILY MEDICINE

## 2020-09-21 PROCEDURE — 80053 COMPREHEN METABOLIC PANEL: CPT | Performed by: FAMILY MEDICINE

## 2020-09-21 PROCEDURE — 36415 COLL VENOUS BLD VENIPUNCTURE: CPT | Performed by: FAMILY MEDICINE

## 2020-09-21 ASSESSMENT — PAIN SCALES - GENERAL: PAINLEVEL: NO PAIN (0)

## 2020-09-21 NOTE — LETTER
September 21, 2020        Faheem Jeff  [address]  [place] [address]          Scooter maintenance - brake issues.      Please evaluate Faheem's scooter for needed maintenance.  Specifically noting difficulty with brake stopping/lurching without brake being applied.          Dinorah Correa MD

## 2020-09-21 NOTE — TELEPHONE ENCOUNTER
Please call patients ILS worker - at [employer]  -  Leigh Ann Chacon  [phone]      There is a script for maintenance for his scooter in box for faxing - he is not sure where this should go.  See if she is aware of location.    Can she assist patient with contacting his sleep clinic through [facility] for getting a new CPAP device.    Thanks,   PSK

## 2020-09-21 NOTE — PATIENT INSTRUCTIONS
Labs today.  Script written and will be sent to your scooter provider for maintenance.  We will contact [facility] to find out where to send this.    Form completed for are.  Order for new shoes/boots for winter.    At [facility], we strive to deliver an exceptional experience to you, every time we see you. If you receive a survey, please complete it as we do value your feedback.  If you have MyChart, you can expect to receive results automatically within 24 hours of their completion.  Your provider will send a note interpreting your results as well.   If you do not have MyChart, you should receive your results in about a week by mail.    Your care team:                            Family Medicine Internal Medicine   MD Ken Stern, MD Chelsea Borrero, MD Mariano Brandon, MD Ashley Giles PA-C  Tete Cooper, APRN CNP    Anish Rod, MD Pediatrics   Naun Mahoney, PA-C  Jody Elizabeth, CNP Chanelle Archer, MD Helena Ivory APRN CNP   MD Agueda Centeno, MD Ghazal Villegas, MD Esmer Cole, APRN CNP  Radha Christensen, PA-C  Neela Esquivel, CNP  MD Dinorah Padilla MD Angela Wermerskirchen, MD      Clinic hours: Monday - Thursday 7 am-7 pm; Fridays 7 am-5 pm.   Urgent care: Monday - Friday 11 am-9 pm; Saturday and Sunday 9 am-5 pm.    Clinic: [phone]       [facility]: Monday - Thursday 8 am - 7 pm; Friday 8 am - 6 pm  [facility]: [phone]     Use [email or web address] for 24/7 diagnosis and treatment of dozens of conditions.

## 2020-09-21 NOTE — PROGRESS NOTES
Subjective     Faheem Jeff is a 65 year old male who presents to clinic today for the following health issues:    HPI       Diabetes Follow-up    How often are you checking your blood sugar? Three times daily  Blood sugar testing frequency justification:  Uncontrolled diabetes  What time of day are you checking your blood sugars (select all that apply)?  Before and after meals  Have you had any blood sugars above 200?  No  Have you had any blood sugars below 70?  No    What symptoms do you notice when your blood sugar is low?  None    What concerns do you have today about your diabetes? None     Do you have any of these symptoms? (Select all that apply)  No numbness or tingling in feet.  No redness, sores or blisters on feet.  No complaints of excessive thirst.  No reports of blurry vision.  No significant changes to weight.    Have you had a diabetic eye exam in the last 12 months? No          Hyperlipidemia Follow-Up      Are you regularly taking any medication or supplement to lower your cholesterol?   Yes- Lipitor    Are you having muscle aches or other side effects that you think could be caused by your cholesterol lowering medication?  No    Hypertension Follow-up      Do you check your blood pressure regularly outside of the clinic? No     Are you following a low salt diet? Yes    Are your blood pressures ever more than 140 on the top number (systolic) OR more   than 90 on the bottom number (diastolic), for example 140/90? n/a    BP Readings from Last 2 Encounters:   09/21/20 (!) 149/74   05/29/20 130/68     Hemoglobin A1C (%)   Date Value   12/05/2019 6.8 (H)   02/28/2019 6.9 (H)     LDL Cholesterol Calculated (mg/dL)   Date Value   12/05/2019 203 (H)   08/02/2018 180 (H)     LDL Cholesterol Direct (mg/dL)   Date Value   02/28/2019 195 (H)        Traumatic brain injury with loss of consciousness, subsequent encounter -chronic deficits persist.  He has a scooter but the brakes are having issues and he is in  "need of a referral/order for maintenance for the scooter.  He has requested that I contact his ILS worker (Leigh Ann Chacon [phone]) to find out where this order should be sent.  EDWINA (obstructive sleep apnea)  -does not have a CPAP machine currently.   He has not had it since he changed residence and was unable to get it back from his prior residence.  I have offered to mention this to Leigh Ann as well and hopefully she can assist with getting him to his sleep clinic for a replacement.  Decreased hearing of both ears -has audiology appointment coming up later this week for evaluation for hearing aids        How many servings of fruits and vegetables do you eat daily?  0-1    On average, how many sweetened beverages do you drink each day (Examples: soda, juice, sweet tea, etc.  Do NOT count diet or artificially sweetened beverages)?   1    How many days per week do you exercise enough to make your heart beat faster? none    How many minutes a day do you exercise enough to make your heart beat faster? n/a    How many days per week do you miss taking your medication? 0        Review of Systems   Constitutional, HEENT, cardiovascular, pulmonary, gi and gu systems are negative, except as otherwise noted.      Objective    /72   Pulse 67   Temp 97.7  F (36.5  C) (Oral)   Ht 1.626 m (5' 4\")   SpO2 98%   BMI 21.80 kg/m    Body mass index is 21.8 kg/m .  Physical Exam   GENERAL: alert, no distress and fatigued  HENT: normal cephalic/atraumatic, ear canals and TM's normal, nose and mouth without ulcers or lesions, oropharynx clear and oral mucous membranes moist  NECK: no adenopathy, no asymmetry, masses, or scars and thyroid normal to palpation  RESP: lungs clear to auscultation - no rales, rhonchi or wheezes  CV: regular rate and rhythm, normal S1 S2, no S3 or S4, no murmur, click or rub, no peripheral edema and peripheral pulses strong  MS: no gross musculoskeletal defects noted, no edema  SKIN: no suspicious " lesions or rashes  PSYCH: affect normal/bright, fatigued and memory deficit noted    Results for orders placed or performed in visit on 09/21/20   Hemoglobin A1c     Status: Abnormal   Result Value Ref Range    Hemoglobin A1C 6.6 (H) 0 - 5.6 %   Lipid panel reflex to direct LDL Fasting     Status: Abnormal   Result Value Ref Range    Cholesterol 218 (H) <200 mg/dL    Triglycerides 108 <150 mg/dL    HDL Cholesterol 61 >39 mg/dL    LDL Cholesterol Calculated 135 (H) <100 mg/dL    Non HDL Cholesterol 157 (H) <130 mg/dL   Comprehensive metabolic panel     Status: Abnormal   Result Value Ref Range    Sodium 140 133 - 144 mmol/L    Potassium 4.4 3.4 - 5.3 mmol/L    Chloride 106 94 - 109 mmol/L    Carbon Dioxide 28 20 - 32 mmol/L    Anion Gap 6 3 - 14 mmol/L    Glucose 118 (H) 70 - 99 mg/dL    Urea Nitrogen 24 7 - 30 mg/dL    Creatinine 0.88 0.66 - 1.25 mg/dL    GFR Estimate 89 >60 mL/min/[1.73_m2]    GFR Estimate If Black >90 >60 mL/min/[1.73_m2]    Calcium 9.0 8.5 - 10.1 mg/dL    Bilirubin Total 0.2 0.2 - 1.3 mg/dL    Albumin 3.5 3.4 - 5.0 g/dL    Protein Total 7.3 6.8 - 8.8 g/dL    Alkaline Phosphatase 81 40 - 150 U/L    ALT 33 0 - 70 U/L    AST 18 0 - 45 U/L           Assessment & Plan     1. Type 2 diabetes mellitus without complication, without long-term current use of insulin (H)  Diabetes under good control.  Continue current treatment  - Miscellaneous Order for DME - ONLY FOR DME  - Hemoglobin A1c  - Comprehensive metabolic panel  - metFORMIN (GLUCOPHAGE) 500 MG tablet; TAKE 3 TABLETS BY MOUTH IN THE MORNING AND 2 TABLETS BY MOUTH IN THE EVENING.  Dispense: 450 tablet; Refill: 1    2. Hyperlipidemia, unspecified hyperlipidemia type  Cholesterol adequately controlled continue Lipitor  - Lipid panel reflex to direct LDL Fasting  - Comprehensive metabolic panel  - atorvastatin (LIPITOR) 40 MG tablet; Take 1 tablet (40 mg) by mouth daily  Dispense: 90 tablet; Refill: 3    3. Traumatic brain injury with loss of  consciousness, subsequent encounter  He continues with support from his ILS worker.  Scooter is in need of maintenance and the letter is printed and will be forwarded to the appropriate location.    4. EDWINA (obstructive sleep apnea)  As noted above he is in need of a new CPAP to be ILS worker is contacted regarding this.    5. Decreased hearing of both ears  Follow-up with audiology as planned.    6. Essential hypertension  Good control of blood pressure continue current medication  - amLODIPine (NORVASC) 10 MG tablet; Take 1 tablet (10 mg) by mouth daily  Dispense: 90 tablet; Refill: 1    7. Cervical radiculopathy  Gabapentin at bedtime for pain continues and refill is sent.  - gabapentin (NEURONTIN) 300 MG capsule; Take 3 capsules (900 mg) by mouth At Bedtime  Dispense: 90 capsule; Refill: 1         Patient Instructions     Labs today.  Script written and will be sent to your scooter provider for maintenance.  We will contact [place] to find out where to send this.    Form completed for [facility].  Order for new shoes/boots for winter.      Return in about 6 months (around 3/21/2021) for chronic health problems.    Dinorah Correa MD  [facility]

## 2020-09-22 ENCOUNTER — OFFICE VISIT (OUTPATIENT)
Dept: AUDIOLOGY | Facility: CLINIC | Age: 65
End: 2020-09-22
Payer: MEDICARE

## 2020-09-22 DIAGNOSIS — H90.6 MIXED HEARING LOSS, BILATERAL: Primary | ICD-10-CM

## 2020-09-22 PROCEDURE — V5011 HEARING AID FITTING/CHECKING: HCPCS | Mod: GY | Performed by: AUDIOLOGIST

## 2020-09-22 PROCEDURE — 99207 ZZC NO CHARGE LOS: CPT | Performed by: AUDIOLOGIST

## 2020-09-22 PROCEDURE — V5240 DISP FEE CONTRALATERAL BINAU: HCPCS | Mod: GY | Performed by: AUDIOLOGIST

## 2020-09-22 PROCEDURE — 92592 HC HEARING AID CHECK, MONAURAL: CPT | Mod: GY | Performed by: AUDIOLOGIST

## 2020-09-22 PROCEDURE — V5020 CONFORMITY EVALUATION: HCPCS | Mod: GY | Performed by: AUDIOLOGIST

## 2020-09-22 PROCEDURE — V5171 HEARING AID MONAURAL ITE: HCPCS | Mod: GY | Performed by: AUDIOLOGIST

## 2020-09-22 NOTE — TELEPHONE ENCOUNTER
This writer attempted to contact [place] on 09/22/20      Reason for call relay message and get fax number and left message.      If patient calls back:   There is a script for maintenance for his scooter in box for faxing - he is not sure where this should go.  See if she is aware of location and FAX NUMBER.     Can she assist patient with [place] [facility] [place].        Genet Edwards MA

## 2020-09-22 NOTE — PATIENT INSTRUCTIONS

## 2020-09-22 NOTE — PROGRESS NOTES
AUDIOLOGY REPORT    SUBJECTIVE: Faheem Jeff, a 65 year old male, was seen in the Audiology Clinic at [facility] today for a Left hearing aid and right ear CROS fitting. 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 The patient was given medical clearance to pursue amplification by  Fabricio Rogers MD. Patient was unaccompanied to today's visit.        OBJECTIVE:  Prior to fitting, a hearing aid check was performed to ensure device functionality. The hearing aid conformity evaluation was completed.The hearing aids were placed and they provided a good fit. Real-ear-probe-microphone measurements were completed on the Baloon[employer] system and were an acceptable match to NAL-NL2 target with soft sounds audible, moderate sounds comfortable, and loud sounds below discomfort. UCLs are verified through maximum power output measures and demonstrate appropriate limiting of loud inputs. Mr. Jeff was oriented to proper hearing aid use, care, cleaning (no water, dry brush), batteries (size 13, insertion/removal, toxicity, low-battery signal), aid insertion/removal, user booklet, warranty information, storage cases, and other hearing aid details. The patient confirmed understanding of hearing aid use and care, and showed proper insertion of hearing aid and batteries while in the office today. Mr. Jeff reported good volume and sound quality today.    EAR(S) FIT: Binaural  MA HEARING AID MAKE: Right: Phonak CROS B-13; Left: Phonak Virto B70-13  MA HEARING AID MODEL #: Right: [id number]; Left: [id number]  HEARING AID STYLE: Right: CROS ITE; Left: ITE  DOME SIZE: Right:   ; Left::      LENGTH: Right:   ; Left:     EARMOLDS: Right:  ; Left:     SERIAL NUMBERS: Right: [id number]; Left: [id number]  WARRANTY END DATE: Right: 9/1/2022; Left:: 9/1/2023      CHARGES:     Hearing Aid Check: Monaural, 47692, $49.00  Dispensing Fee: BICROS, , $400.00 LT (Left)  Fit/Orientation: Monaural, , $185.00  Hearing  Aid Conformity Evaluation: 1, , $87.00LT  Hearing Aid Digital: BETTYE CABAN, .LT, NU $2259  Total: $2980    ASSESSMENT: A left ear hearing aid and right ear CROS fitting completed today. Verification measures were performed. The 45 day trial period was explained to patient, and they expressed understanding. Mr. Jeff signed the Hearing Aid Purchase Agreement and was given a copy, as well as details on his hearing aids. Patient was counseled that exact out of pocket amounts cannot be determined for hearing aid claims being sent to insurance. Any insurance coverage information presented to the patient is an estimate only, and is not a guarantee of payment. Patient has been advised to check with their own insurance.    PLAN: Mr. Jeff will return for follow-up in 2-3 weeks for a hearing aid review appointment. Please call this clinic with questions regarding today s appointment.    Bib Mccall CCC-A  Licensed Audiologist #[id number]  9/22/2020

## 2020-09-23 NOTE — TELEPHONE ENCOUNTER
This writer attempted to contact [place] on 09/23/20        Reason for call relay message and get fax number and left message.        If patient calls back:              There is a script for maintenance for his scooter in box for faxing - he is not sure where this should go.  See if she is aware of location and FAX NUMBER.     Can she assist patient with contacting [place] [facility] [place] device.           Genet Edwards MA

## 2020-09-25 PROBLEM — F10.21 ALCOHOLISM IN REMISSION (H): Status: RESOLVED | Noted: 2018-08-02 | Resolved: 2020-09-25

## 2020-09-25 RX ORDER — AMLODIPINE BESYLATE 10 MG/1
10 TABLET ORAL DAILY
Qty: 90 TABLET | Refills: 1 | Status: SHIPPED | OUTPATIENT
Start: 2020-09-25 | End: 2023-08-19

## 2020-09-25 RX ORDER — GABAPENTIN 300 MG/1
900 CAPSULE ORAL AT BEDTIME
Qty: 90 CAPSULE | Refills: 1 | Status: SHIPPED | OUTPATIENT
Start: 2020-09-25 | End: 2021-10-27

## 2020-09-25 RX ORDER — ATORVASTATIN CALCIUM 40 MG/1
40 TABLET, FILM COATED ORAL DAILY
Qty: 90 TABLET | Refills: 3 | Status: SHIPPED | OUTPATIENT
Start: 2020-09-25 | End: 2021-10-03

## 2020-09-25 NOTE — RESULT ENCOUNTER NOTE
Your kidney and liver testing are all normal.  Your cholesterol is much improved compared with previous.  I would recommend you continue your Lipitor use.  Your long-term blood sugar testing indicates good control as well.  I would recommend you continue your current metformin along with diet and exercise management.  Please call or MyChart message me if you have any questions.    PSK

## 2020-10-02 ENCOUNTER — TELEPHONE (OUTPATIENT)
Dept: FAMILY MEDICINE | Facility: CLINIC | Age: 65
End: 2020-10-02

## 2020-10-02 NOTE — TELEPHONE ENCOUNTER
[facility] medical supply forms placed in Dr. Yoli myers for completion.    Genet HOOVER, Patient Care

## 2020-10-21 ENCOUNTER — TELEPHONE (OUTPATIENT)
Dept: FAMILY MEDICINE | Facility: CLINIC | Age: 65
End: 2020-10-21

## 2020-10-21 DIAGNOSIS — E11.9 TYPE 2 DIABETES MELLITUS WITHOUT COMPLICATION, WITHOUT LONG-TERM CURRENT USE OF INSULIN (H): Primary | ICD-10-CM

## 2020-10-21 NOTE — TELEPHONE ENCOUNTER
Reason for Call:  Medication or medication refill:    Do you use a [facility]? No   Name of the pharmacy and phone number for the current request:  [id number] [address] [facility] [address]    Name of the medication requested: Test Strips  Insulin and Nova log Flex Pen    Other request: Patient is not sure what provider prescribed the medication but he is needing them filled.     Can we leave a detailed message on this number? YES    Phone number patient can be reached at: Cell number on file:    Telephone Information:   Mobile [phone]       Best Time: any     Call taken on 10/21/2020 at 3:55 PM by Fallon Funes

## 2020-10-21 NOTE — TELEPHONE ENCOUNTER
Reason for Call:  Other prescription    Detailed comments: Patient has questions and concerns about his medication and them not being able to be covered by insurance     Phone Number Patient can be reached at: Home number on file [phone] (home)    Best Time: Anytime.    Can we leave a detailed message on this number? YES    Call taken on 10/21/2020 at 4:06 PM by Carla Rivero

## 2020-10-22 NOTE — TELEPHONE ENCOUNTER
This writer attempted to contact Faheem on 10/22/20      Reason for call triage and left message.      If patient calls back:   Registered Nurse called. Follow Triage Call workflow        Anni Kennedy RN

## 2020-10-22 NOTE — TELEPHONE ENCOUNTER
Routing refill request to provider for review/approval because:  Notes states patient is requesting insulin and novolog flex pen - noting no insulin on active med list - can we clarify please

## 2020-10-22 NOTE — TELEPHONE ENCOUNTER
Please call patient re:  Ask who prescribed and how often he takes.      I have never prescribed insulin of any type for him.  I do not see it on his med list from the outside med reconciliation.    I did send the test strips for him.          MARIE

## 2020-10-23 NOTE — TELEPHONE ENCOUNTER
This writer attempted to contact Faheem on 10/23/20        Reason for call THERE ARE TWO SEPARATE ENCOUNTERS FOR THIS PATIENT- PLEASE ASK PATIENT ABOUT BOTH ENCOUNTERS and left message.        If patient calls back:              Registered Nurse called. Follow Triage Call workflow           Sarah Page RN

## 2020-10-26 ENCOUNTER — TELEPHONE (OUTPATIENT)
Dept: FAMILY MEDICINE | Facility: CLINIC | Age: 65
End: 2020-10-26

## 2020-10-26 NOTE — TELEPHONE ENCOUNTER
(2nd attempt) This writer attempted to contact Faheem on 10/26/20        Reason for call THERE ARE TWO SEPARATE ENCOUNTERS FOR THIS PATIENT- PLEASE ASK PATIENT ABOUT BOTH ENCOUNTERS   and left message.        If patient calls back:              Registered Nurse called. Follow Triage Call workflow           Sarah Page RN

## 2020-10-27 NOTE — TELEPHONE ENCOUNTER
At least 3 attempts at trying to reach patient over the past week. Routing to provider. 2 encounters. One from yesterday and 10/21/20.    Anni Kennedy RN

## 2020-11-04 ENCOUNTER — TELEPHONE (OUTPATIENT)
Dept: FAMILY MEDICINE | Facility: CLINIC | Age: 65
End: 2020-11-04

## 2020-11-16 ENCOUNTER — HEALTH MAINTENANCE LETTER (OUTPATIENT)
Age: 65
End: 2020-11-16

## 2020-11-27 ENCOUNTER — VIRTUAL VISIT (OUTPATIENT)
Dept: FAMILY MEDICINE | Facility: CLINIC | Age: 65
End: 2020-11-27
Payer: MEDICARE

## 2020-11-27 DIAGNOSIS — E11.9 TYPE 2 DIABETES MELLITUS WITHOUT COMPLICATION, WITHOUT LONG-TERM CURRENT USE OF INSULIN (H): Primary | ICD-10-CM

## 2020-11-27 PROCEDURE — 99442 PR PHYSICIAN TELEPHONE EVALUATION 11-20 MIN: CPT | Mod: 95 | Performed by: FAMILY MEDICINE

## 2020-11-27 NOTE — PROGRESS NOTES
"Faheem Jeff is a 65 year old male who is being evaluated via a billable telephone visit.      The patient has been notified of following:     \"This telephone visit will be conducted via a call between you and your physician. We have found that certain health care needs can be provided without the need for a physical exam.  This service lets us provide the care you need with a short phone conversation.  If a prescription is necessary we can send it directly to your pharmacy.  If lab work is needed we can place an order for that and you can then stop by our lab to have the test done at a later time.    Telephone visits are billed at different rates depending on your insurance coverage. During this emergency period, for some insurers they may be billed the same as an in-person visit.  Please reach out to your insurance provider with any questions.  If during the course of the call the physician feels a telephone visit is not appropriate, you will not be charged for this service.\"    Patient has given verbal consent for Telephone visit?  Yes    What phone number would you like to be contacted at? [phone]    How would you like to obtain your AVS? Mail a copy      SUBJECTIVE:    Faheem Jeff is a 65 year old male who prevents via telephone visit today for the following issue(s):    HPI:    Diabetes Follow-up    How often are you checking your blood sugar?  2-3 times daily  Blood sugar testing frequency justification:  Patient modifying lifestyle changes (diet, exercise) with blood sugars  What time of day are you checking your blood sugars (select all that apply)?  Before and after meals  Have you had any blood sugars above 200?  No  Have you had any blood sugars below 70?  No    What symptoms do you notice when your blood sugar is low?  None and Not applicable    What concerns do you have today about your diabetes? Other: Clarification of plan     Do you have any of these symptoms? (Select all that apply)  No " numbness or tingling in feet.  No redness, sores or blisters on feet.  No complaints of excessive thirst.  No reports of blurry vision.  No significant changes to weight.    Have you had a diabetic eye exam in the last 12 months? No        BP Readings from Last 2 Encounters:   09/21/20 136/72   05/29/20 130/68     Hemoglobin A1C (%)   Date Value   09/21/2020 6.6 (H)   12/05/2019 6.8 (H)     LDL Cholesterol Calculated (mg/dL)   Date Value   09/21/2020 135 (H)   12/05/2019 203 (H)               Past medical, family, and social histories, medications, and allergies are reviewed and updated in Epic.  Allergies: Dust mite extract, Meclizine, Poplar bud extract, and Trazodone derivatives      Objective:         There were no vitals taken for this visit., since this is a telephone visit.  healthy, alert and no distress  RESP: No cough, no audible wheezing, able to talk in full sentences.  PSYCH: Alert and oriented times 3; coherent speech, normal   rate and volume, able to articulate logical thoughts, able   to abstract reason, no tangential thoughts, no hallucinations   or delusions, and affect is normal and pleasant  The remainder of the exam cannot be completed due to this being a telephone visit.      Diagnostic Test Results:  Lab Results   Component Value Date    A1C 6.6 09/21/2020    A1C 6.8 12/05/2019    A1C 6.9 02/28/2019    A1C 6.6 08/02/2018          =====    ASSESSMENT/PLAN:  (E11.9) Type 2 diabetes mellitus without complication, without long-term current use of insulin (H)  (primary encounter diagnosis)  Comment: Well-controlled  Plan:  Return in about 4 months (around 3/21/2021) for full physical, diabetes.   I reviewed several things with the patient (lab results, likely refills remaining per the Medication List, multiple preventive health recommendations which he can later review in my chart).  Recommend diabetic eye exam.    Phone call duration:  18 minutes    Cesar Norris MD

## 2020-12-01 ENCOUNTER — OFFICE VISIT (OUTPATIENT)
Dept: AUDIOLOGY | Facility: CLINIC | Age: 65
End: 2020-12-01
Payer: MEDICARE

## 2020-12-01 ENCOUNTER — TELEPHONE (OUTPATIENT)
Dept: FAMILY MEDICINE | Facility: CLINIC | Age: 65
End: 2020-12-01

## 2020-12-01 DIAGNOSIS — H90.6 MIXED HEARING LOSS, BILATERAL: Primary | ICD-10-CM

## 2020-12-01 PROCEDURE — 92592 PR HEARING AID CHECK, MONAURAL: CPT | Performed by: AUDIOLOGIST

## 2020-12-01 PROCEDURE — V5266 BATTERY FOR HEARING DEVICE: HCPCS | Performed by: AUDIOLOGIST

## 2020-12-01 PROCEDURE — 99207 PR NO CHARGE LOS: CPT | Performed by: AUDIOLOGIST

## 2020-12-01 NOTE — PROGRESS NOTES
AUDIOLOGY REPORT: HEARING AID RECHECK    SUBJECTIVE: Faheem Jeff is a 65 year old male, :  1955, was seen in the Audiology Clinic at [facility] on 20 for a return check of their hearing aids. Patient was unaccompanied to today's visit.       Background:   Patient is here today with the complaint of the right ear CROS has broken.     Procedures:       SIDE: Right    : [employer]    TYPE: CROS B-13 ITE    S/N: [id number]    WARRANTY: 2022    The faceplate was  from the hearing aid. The CROS ITE is sent to [employer] for in warranty repair. Also Faheem wanted his left ear Virto B70-13 hearing aid turned up. Gain was increased 2 steps for all input levels across all frequencies. General cleaning of the hearing aid was performed. Waxguard was replaced. Biologic listening check finds the hearing aid sounding crisp and clear.     Per guidelines of patient's insurance, 30 units of size 13 batteries were dispensed today. Reviewed that patent is eligable for batteries once every 90 days, and how they can request new batteries.    Plan:   Patient will return as needed for hearing aid concerns. Once the CROS is back from repair patient will be called for .     CHARGES:   84360 Monaural hearing aid check & [id number] x30 Batteries    Bib Mccall CCC-A  Licensed Audiologist #[id number]  2020

## 2020-12-02 ENCOUNTER — TELEPHONE (OUTPATIENT)
Dept: FAMILY MEDICINE | Facility: CLINIC | Age: 65
End: 2020-12-02

## 2020-12-02 DIAGNOSIS — E11.9 TYPE 2 DIABETES MELLITUS WITHOUT COMPLICATION, WITHOUT LONG-TERM CURRENT USE OF INSULIN (H): Primary | ICD-10-CM

## 2020-12-02 NOTE — TELEPHONE ENCOUNTER
Prescription approved per [facility] Refill Protocol.      Yvon Nguyen RN, BSN, PHN

## 2020-12-10 ENCOUNTER — TELEPHONE (OUTPATIENT)
Dept: AUDIOLOGY | Facility: CLINIC | Age: 65
End: 2020-12-10

## 2020-12-10 NOTE — TELEPHONE ENCOUNTER
I informed Faheem that his CROS was at the 2nd floor  for . I provided the hours of operation of 8 am to 3:30 pm for the desk.     VITALY GREGG B-13 ITE [[id number]] warranty 9/1/2022    -Bib Gómez.D CCC-A

## 2020-12-15 ENCOUNTER — TELEPHONE (OUTPATIENT)
Dept: FAMILY MEDICINE | Facility: CLINIC | Age: 65
End: 2020-12-15

## 2020-12-23 ENCOUNTER — TELEPHONE (OUTPATIENT)
Dept: FAMILY MEDICINE | Facility: CLINIC | Age: 65
End: 2020-12-23

## 2020-12-23 NOTE — TELEPHONE ENCOUNTER
Reason for Call:  Other     Detailed comments: Patient calling. States he has heart issues and is wondering if there is any reason he should not get vaccinated for COVID when he has the opportunity.     Phone Number Patient can be reached at: Home number on file [phone] (home)    Best Time: any    Can we leave a detailed message on this number? YES    Call taken on 12/23/2020 at 12:00 PM by Dafne Kelley

## 2020-12-23 NOTE — TELEPHONE ENCOUNTER
Spoke with patient, advised of getting covid vaccine when he has the opportunity.     Patient wondering if he would be able to get a prescription for NovoLog FlexPen, as he has had it before else where. Would you like him to have an office visit for this?     Brenda Mitchell MA

## 2021-01-01 ENCOUNTER — TRANSFERRED RECORDS (OUTPATIENT)
Dept: MULTI SPECIALTY CLINIC | Facility: CLINIC | Age: 66
End: 2021-01-01

## 2021-01-01 LAB — RETINOPATHY: NORMAL

## 2021-02-03 NOTE — TELEPHONE ENCOUNTER
Patient calling to find out status of this request from 12- about getting a script for Novolog.    Please call today if possible.  OK to NA on VM.    Patient uses [facility] on [address] in [place]

## 2021-02-04 NOTE — TELEPHONE ENCOUNTER
Routing refill request to provider for review/approval because:  Drug not active on patient's medication list    Please see message(s) below, patient asking if needs visit to have Novolog ordered      Usha Muñoz RN, BSN, CMSRN  [facility]

## 2021-02-10 NOTE — TELEPHONE ENCOUNTER
To Provider,  Patient calling on the status of his request back dated to 12/23/2020 for a NovoLog FlexPen. Please advise if you would need to see the patient in clinic to address the request. Patient then can be notified either way or your decision.   Thank you,  Leana Mccracken TC

## 2021-02-15 NOTE — TELEPHONE ENCOUNTER
Called pt and LVM asking him to call back if he has been prescribed this in the past so we can verify with pharmacy in order to fill it

## 2021-02-15 NOTE — TELEPHONE ENCOUNTER
Please call patient re:  I have no record of patient being on Novolog in our records or any of the records from previous physicians in computer. Recent diabetic control has been good with just use of Metformin.  IF he is getting this medication from somewhere else, he should let me know - which pharmacy has he filled it at recently and we can verify script and prescriber with that pharmacy.    PSK        Lab Results   Component Value Date    A1C 6.6 09/21/2020    A1C 6.8 12/05/2019    A1C 6.9 02/28/2019    A1C 6.6 08/02/2018

## 2021-02-18 ENCOUNTER — TELEPHONE (OUTPATIENT)
Dept: FAMILY MEDICINE | Facility: CLINIC | Age: 66
End: 2021-02-18

## 2021-02-18 NOTE — TELEPHONE ENCOUNTER
"Patient called to clinic to discuss a prescription request for Novolog Flexpen insulin.  Patient states he used to take this, had a sliding scale regimen but has been off the insulin for \" a long time\" but would like to get started back on it.    Per review of chart, do not see this medication has ever been prescribed by [facility]. No record of Novolog on current or previous medication list.    Because patient is requesting a medication we have not prescribed in the past, have no record of him taking insulin and since patient is about due for a follow up on his diabetes, writer suggested scheduling a visit with PCP for follow up and to further discuss insulin request.    Patient did agree to this plan, writer scheduled patient for telephone visit on 3/1/21 with Dr. Correa. Patient stated that due to his disability and fact he does not drive and has to use a scooter for mobility, does not walk, he needs organized transportation and this takes him time to set up, would prefer a virtual visit.    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 Has history of a TBI.  Patient can discuss re-starting on Novolog insulin with PCP at time of virtual visit.    Patient also discussed his plans to establish care with a provider that is closer to where he lives. Patient is now living in [place]. Writer informed patient of [facility] and gave address and phone number to this site. Patient stated he will likely establish with this site in the near future then.    Writer then called to  pharmacy in [place] and asked about Novolog prescription details. Pharmacy noted this medication is no longer in patient's file, which means it has been over 18 months since it was last refilled. Pharmacy only keeps medications on file " for 18 months and then it is removed. Pharmacy unable to give any details about Novolog.  Patient will need to re-establish this medication with PCP at time of visit on 3/1.    Bonny Regalado RN  [facility]

## 2021-02-19 NOTE — TELEPHONE ENCOUNTER
Spoke with patient he informed me he has not had Novolog for over 18 months. Patient very upset we didn't have records of him taking Novolog KEM's mailed to patient per his request.Patient scheduled for 3/1/21 with PCP to address medication.        JOSH Morrison MA

## 2021-02-24 ENCOUNTER — TELEPHONE (OUTPATIENT)
Dept: AUDIOLOGY | Facility: CLINIC | Age: 66
End: 2021-02-24

## 2021-02-24 NOTE — TELEPHONE ENCOUNTER
Reason for Call:  Other     Detailed comments: is wondering how long it takes for hearing aid batteries to come in?     Phone Number Patient can be reached at: Other phone number:  [place] [phone] [facility] [place] worker    Best Time: any    Can we leave a detailed message on this number? YES    Call taken on 2/24/2021 at 3:35 PM by Radha Trevino

## 2021-02-24 NOTE — TELEPHONE ENCOUNTER
I informed Faheem that his insurance covers batteries every 90 days and he would be eligible on 3/2 for his next allotment. Which he will  at his appointment on 3/8.    -Bib FRANKA

## 2021-03-01 ENCOUNTER — VIRTUAL VISIT (OUTPATIENT)
Dept: FAMILY MEDICINE | Facility: CLINIC | Age: 66
End: 2021-03-01
Payer: MEDICARE

## 2021-03-01 ENCOUNTER — TELEPHONE (OUTPATIENT)
Dept: FAMILY MEDICINE | Facility: CLINIC | Age: 66
End: 2021-03-01

## 2021-03-01 VITALS — WEIGHT: 135 LBS | HEIGHT: 64 IN | BODY MASS INDEX: 23.05 KG/M2

## 2021-03-01 DIAGNOSIS — Z79.4 TYPE 2 DIABETES MELLITUS WITHOUT COMPLICATION, WITH LONG-TERM CURRENT USE OF INSULIN (H): Primary | ICD-10-CM

## 2021-03-01 DIAGNOSIS — Z79.4 TYPE 2 DIABETES MELLITUS WITHOUT COMPLICATION, WITH LONG-TERM CURRENT USE OF INSULIN (H): ICD-10-CM

## 2021-03-01 DIAGNOSIS — E11.9 TYPE 2 DIABETES MELLITUS WITHOUT COMPLICATION, WITH LONG-TERM CURRENT USE OF INSULIN (H): Primary | ICD-10-CM

## 2021-03-01 DIAGNOSIS — E11.9 TYPE 2 DIABETES MELLITUS WITHOUT COMPLICATION, WITH LONG-TERM CURRENT USE OF INSULIN (H): ICD-10-CM

## 2021-03-01 PROCEDURE — 99443 PR PHYSICIAN TELEPHONE EVALUATION 21-30 MIN: CPT | Mod: 95 | Performed by: FAMILY MEDICINE

## 2021-03-01 RX ORDER — PEN NEEDLE, DIABETIC 32GX 5/32"
NEEDLE, DISPOSABLE MISCELLANEOUS
Qty: 100 EACH | Refills: 3 | Status: SHIPPED | OUTPATIENT
Start: 2021-03-01 | End: 2022-04-04

## 2021-03-01 RX ORDER — GLUCOSAMINE HCL/CHONDROITIN SU 500-400 MG
CAPSULE ORAL
Qty: 100 EACH | Refills: 3 | Status: SHIPPED | OUTPATIENT
Start: 2021-03-01

## 2021-03-01 RX ORDER — INSULIN ASPART 100 [IU]/ML
INJECTION, SOLUTION INTRAVENOUS; SUBCUTANEOUS
Qty: 15 ML | Refills: 1 | Status: SHIPPED | OUTPATIENT
Start: 2021-03-01 | End: 2021-03-02

## 2021-03-01 ASSESSMENT — MIFFLIN-ST. JEOR: SCORE: 1298.36

## 2021-03-01 NOTE — TELEPHONE ENCOUNTER
Message from [facility] [phone]    insulin aspart (NOVOLOG FLEXPEN) 100 UNIT/ML pen    Need max daily units for billing purposes.    Leana Singh

## 2021-03-01 NOTE — PROGRESS NOTES
Faheem is a 65 year old who is being evaluated via a billable telephone visit.      What phone number would you like to be contacted at? [phone]    How would you like to obtain your AVS? Mail a copy    Assessment & Plan     Type 2 diabetes mellitus without complication, with long-term current use of insulin (H)  Continue current metformin dosing.  Discussed importance of monitoring blood sugar fasting or 2 hours after meal.  Use insulin only by sliding scale.  Keep records of use for appointment in 6 weeks.  - insulin pen needle (ULTICARE MICRO) 32G X 4 MM miscellaneous; Use 3 pen needles daily or as directed.  - alcohol swab prep pads; Use to swab area of injection/demarco as directed.  insulin aspart (NOVOLOG FLEXPEN) 100 UNIT/ML pen 3 mL 1 3/2/2021  No   Sig: Check blood sugar three times a day Sliding scale insulin:    150-200   1 unit,  201-250   2 units,  251-300    3 units,  301-350  4 units,  Over 350   5 units   Max daily units:  10/day                Tobacco Cessation:   reports that he has been smoking. He has never used smokeless tobacco.  Tobacco Cessation Action Plan: Information offered: Patient not interested at this time    Patient Instructions   Continue metformin as previous.    Monitor the blood sugar three times a day. Novolog flex pen sent to pharmacy for use if elevated blood sugar when you are fasting.  If you check blood sugars after eating - wait at least 2 hours.    Sliding scale insulin:   150-200   1 unit    201-250   2 unit2  251-300    3 units,    301-350  4 units,    Over 350   5 units      Return in about 6 weeks (around 4/12/2021) for chronic health problems.    Dinorah Correa MD  [facility]   Faheem is a 65 year old who presents for the following health issues     HPI       Diabetes Follow-up  Given contour meter and novolog flex pen- used sliding scale.  May of last year - out of the flex pen since then.    How often are you checking  your blood sugar? Four or more times daily previously, no longer have novalog   Blood sugar testing frequency justification:  Uncontrolled diabetes   What time of day are you checking your blood sugars (select all that apply)?  Before meals  Have you had any blood sugars above 200?  No  Have you had any blood sugars below 70?  Cant remember    What symptoms do you notice when your blood sugar is low?  None and dont think blood sugar     What concerns do you have today about your diabetes? Other: would like to get novolog flex pen     Do you have any of these symptoms? (Select all that apply)  No numbness or tingling in feet.  No redness, sores or blisters on feet.  No complaints of excessive thirst.  No reports of blurry vision.  No significant changes to weight.    Have you had a diabetic eye exam in the last 12 months? Yes-  Location: [facility] [place]     Has a meter again - has not been checking due to not having the insulin available.  Was using insulin in sliding scale in past after diagnosis.  (from [facility] - has release of informations available for completion to get records to me.)  Last A1C can be noted below and well controlled.     BP Readings from Last 2 Encounters:   09/21/20 136/72   05/29/20 130/68     Hemoglobin A1C (%)   Date Value   09/21/2020 6.6 (H)   12/05/2019 6.8 (H)     LDL Cholesterol Calculated (mg/dL)   Date Value   09/21/2020 135 (H)   12/05/2019 203 (H)                 How many servings of fruits and vegetables do you eat daily?  0-1    On average, how many sweetened beverages do you drink each day (Examples: soda, juice, sweet tea, etc.  Do NOT count diet or artificially sweetened beverages)?   1    How many days per week do you exercise enough to make your heart beat faster? 3 or less    How many minutes a day do you exercise enough to make your heart beat faster? 9 or less    How many days per week do you miss taking your  medication? 0        Review of Systems   Constitutional, HEENT, cardiovascular, pulmonary, gi and gu systems are negative, except as otherwise noted.      Objective           Vitals:  No vitals were obtained today due to virtual visit.    Physical Exam   healthy, alert and no distress  PSYCH: Alert and oriented times 3; coherent speech, normal   rate and volume, able to articulate logical thoughts, able   to abstract reason, no tangential thoughts, no hallucinations   or delusions  His affect is normal  RESP: No cough, no audible wheezing, able to talk in full sentences  Remainder of exam unable to be completed due to telephone visits                Phone call duration: 22 minutes

## 2021-03-01 NOTE — PATIENT INSTRUCTIONS
Continue metformin as previous.    Monitor the blood sugar three times a day. Novolog flex pen sent to pharmacy for use if elevated blood sugar when you are fasting.  If you check blood sugars after eating - wait at least 2 hours.    Sliding scale insulin:   150-200   1 unit    201-250   2 unit2  251-300    3 units,    301-350  4 units,    Over 350   5 units

## 2021-03-02 RX ORDER — INSULIN ASPART 100 [IU]/ML
INJECTION, SOLUTION INTRAVENOUS; SUBCUTANEOUS
Qty: 3 ML | Refills: 1 | Status: SHIPPED | OUTPATIENT
Start: 2021-03-02 | End: 2022-03-16

## 2021-03-02 NOTE — TELEPHONE ENCOUNTER
Jody, Pharmacist at [facility] called to follow up on this Rx.  Patient was in pharmacy yesterday, Jody states he will most likely walk in first thing this morning looking for this Rx    Claudia Vega RN, [facility]

## 2021-03-08 ENCOUNTER — OFFICE VISIT (OUTPATIENT)
Dept: AUDIOLOGY | Facility: CLINIC | Age: 66
End: 2021-03-08
Payer: MEDICARE

## 2021-03-08 DIAGNOSIS — H90.6 MIXED HEARING LOSS, BILATERAL: Primary | ICD-10-CM

## 2021-03-08 PROCEDURE — V5299 HEARING SERVICE: HCPCS | Performed by: AUDIOLOGIST

## 2021-03-08 PROCEDURE — V5266 BATTERY FOR HEARING DEVICE: HCPCS | Performed by: AUDIOLOGIST

## 2021-03-08 PROCEDURE — 99207 PR NO CHARGE LOS: CPT | Performed by: AUDIOLOGIST

## 2021-03-08 NOTE — PROGRESS NOTES
AUDIOLOGY REPORT: HEARING AID RECHECK    SUBJECTIVE: Faheem Jeff is a 65 year old male, :  1955, was seen in the Audiology Clinic at [facility] on 3/08/21 for a return check of their hearing aids. Patient was unaccompanied to today's visit.       Background:   Patient is here today with the complaint of the right ear hearing aid is missing a piece.     Procedures:       SIDE: Both    : Phonak    TYPE: Virto B70-13 & CROS ITE    S/N:      R: [id number]     L: [id number]    WARRANTY: 2023    Today we explained that the right ear CROS does not have a waxguard (like the left ear) because the system does not send sound to the right ear only the left. Patient requested his 90 day supply of batteries and 30 size 13 were provided to him.     Plan:   Patient will return as needed for hearing aid concerns.     CHARGES:   [id number] x30 Batteries     Bib Mccall CCC-A  Licensed Audiologist #[id number]  3/8/2021

## 2021-04-03 ENCOUNTER — HEALTH MAINTENANCE LETTER (OUTPATIENT)
Age: 66
End: 2021-04-03

## 2021-04-26 ENCOUNTER — TELEPHONE (OUTPATIENT)
Dept: FAMILY MEDICINE | Facility: CLINIC | Age: 66
End: 2021-04-26

## 2021-04-26 NOTE — TELEPHONE ENCOUNTER
certificate of medical necessity [employer] medical form placed in Dr. Correa's bin for completion.      Marley THOMAS (R))

## 2021-05-03 ENCOUNTER — TELEPHONE (OUTPATIENT)
Dept: FAMILY MEDICINE | Facility: CLINIC | Age: 66
End: 2021-05-03

## 2021-05-03 NOTE — TELEPHONE ENCOUNTER
[facility] Medical life mechanism order form faxed to Dr. Correa via [employer] for completion    ESTIVEN Casey.

## 2021-05-07 NOTE — TELEPHONE ENCOUNTER
Notify patient he will need an office visit or virtual visit for completion of these forms. Telephone visit is ok per forms.   MARIE

## 2021-05-13 ENCOUNTER — TELEPHONE (OUTPATIENT)
Dept: FAMILY MEDICINE | Facility: CLINIC | Age: 66
End: 2021-05-13

## 2021-05-13 ENCOUNTER — MEDICAL CORRESPONDENCE (OUTPATIENT)
Dept: HEALTH INFORMATION MANAGEMENT | Facility: CLINIC | Age: 66
End: 2021-05-13

## 2021-05-13 ENCOUNTER — OFFICE VISIT (OUTPATIENT)
Dept: FAMILY MEDICINE | Facility: CLINIC | Age: 66
End: 2021-05-13
Payer: MEDICARE

## 2021-05-13 VITALS
DIASTOLIC BLOOD PRESSURE: 62 MMHG | WEIGHT: 134.2 LBS | HEART RATE: 72 BPM | OXYGEN SATURATION: 98 % | SYSTOLIC BLOOD PRESSURE: 132 MMHG | RESPIRATION RATE: 16 BRPM | BODY MASS INDEX: 23.04 KG/M2 | TEMPERATURE: 97.8 F

## 2021-05-13 DIAGNOSIS — M54.16 LUMBAR RADICULAR PAIN: ICD-10-CM

## 2021-05-13 DIAGNOSIS — H11.001 PTERYGIUM OF RIGHT EYE: ICD-10-CM

## 2021-05-13 DIAGNOSIS — E11.9 TYPE 2 DIABETES MELLITUS WITHOUT COMPLICATION, WITH LONG-TERM CURRENT USE OF INSULIN (H): ICD-10-CM

## 2021-05-13 DIAGNOSIS — Z79.4 TYPE 2 DIABETES MELLITUS WITHOUT COMPLICATION, WITH LONG-TERM CURRENT USE OF INSULIN (H): ICD-10-CM

## 2021-05-13 DIAGNOSIS — G47.33 OSA (OBSTRUCTIVE SLEEP APNEA): ICD-10-CM

## 2021-05-13 DIAGNOSIS — S06.9X9D TRAUMATIC BRAIN INJURY WITH LOSS OF CONSCIOUSNESS, SUBSEQUENT ENCOUNTER: ICD-10-CM

## 2021-05-13 DIAGNOSIS — Z01.818 PREOP GENERAL PHYSICAL EXAM: Primary | ICD-10-CM

## 2021-05-13 DIAGNOSIS — I10 ESSENTIAL HYPERTENSION: ICD-10-CM

## 2021-05-13 LAB — HBA1C MFR BLD: 7.3 % (ref 0–5.6)

## 2021-05-13 PROCEDURE — 36415 COLL VENOUS BLD VENIPUNCTURE: CPT | Performed by: FAMILY MEDICINE

## 2021-05-13 PROCEDURE — 99215 OFFICE O/P EST HI 40 MIN: CPT | Performed by: FAMILY MEDICINE

## 2021-05-13 PROCEDURE — 83036 HEMOGLOBIN GLYCOSYLATED A1C: CPT | Performed by: FAMILY MEDICINE

## 2021-05-13 RX ORDER — ZOLPIDEM TARTRATE 5 MG/1
TABLET ORAL
COMMUNITY
Start: 2021-04-02

## 2021-05-13 NOTE — PROGRESS NOTES
[address] [address]  Phone: [phone]  Primary Provider: Aishwarya Ortez  Pre-op Performing Provider: AISHWARYA ORTEZ      PREOPERATIVE EVALUATION:  Today's date: 5/13/2021    Faheem Jeff is a 65 year old male who presents for a preoperative evaluation.    Surgical Information:  Surgery/Procedure: Eye Surgery - excision of pterygium    Surgery Location: [facility] [place]  Surgeon: Dr. Mccracken  Surgery Date: 05/18/2021  Time of Surgery: TBD  Where patient plans to recover: At home with family  Fax number for surgical facility: [phone]    Type of Anesthesia Anticipated: to be determined    Assessment & Plan     The proposed surgical procedure is considered LOW risk.    Problem List Items Addressed This Visit     Diabetes mellitus, type II (H)    Relevant Orders    Hemoglobin A1c (Completed)    Essential hypertension    Lumbar radicular pain    Relevant Medications    zolpidem (AMBIEN) 5 MG tablet    EDWINA (obstructive sleep apnea)    Traumatic brain injury (H)      Other Visit Diagnoses     Preop general physical exam    -  Primary    Pterygium of right eye                  Risks and Recommendations:  The patient has the following additional risks and recommendations for perioperative complications:  Diabetes:  - Patient is on insulin therapy; diabetic NPO guidelines provided and discussed.  Uses mealtime novolog only - has not needed in months.   Obstructive Sleep Apnea:   Does not have CPAP available - monitor for desaturations perioperatively.    Medication Instructions:  Patient is to take all scheduled medications on the day of surgery EXCEPT for modifications listed below:   - aspirin: Discontinue aspirin 7-10 days prior to procedure to reduce bleeding risk. It should be resumed postoperatively.    - short acting insulin (e.g. regular, lispro, aspart): HOLD on the morning of surgery.    - metformin: HOLD day of  surgery.    RECOMMENDATION:  APPROVAL GIVEN to proceed with proposed procedure, without further diagnostic evaluation.    Review of the result(s) of each unique test - A1C          58 minutes spent on the date of the encounter doing chart review, review of test results, patient visit, documentation and completion of form for lift chair, insurance paperwork for diabetes and dietary paperrwork for [facility].         Subjective     HPI related to upcoming procedure: 65 year old with right eye pterygium with plan for surgical excision.    1. No - Have you ever had a heart attack or stroke?  2. No - Have you ever had surgery on your heart or blood vessels, such as a stent, coronary (heart) bypass, or surgery on an artery in the head, neck, heart, or legs?  3. No - Do you have chest pain when you are physically active?  4. No - Do you have a history of heart failure?  5. No - Do you currently have a cold, bronchitis, or symptoms of other respiratory (head and chest) infections?  6. No - Do you have a cough, shortness of breath, or wheezing?  7. No - Do you or anyone in your family have a history of blood clots?  8. No - Do you or anyone in your family have a serious bleeding problem, such as long-lasting bleeding after surgeries or cuts?  9. No - Have you ever had anemia or been told to take iron pills?  10. No - Have you had any abnormal blood loss such as black, tarry or bloody stools, or abnormal vaginal bleeding?  11. No - Have you ever had a blood transfusion?  12. Yes - Are you willing to have a blood transfusion if it is medically needed before, during, or after your surgery?  13. No - Have you or anyone in your family ever had problems with anesthesia (sedation for surgery)?  14. YES - Do you have sleep apnea, excessive snoring, or daytime drowsiness?   Has sleep study planned, no CPAP available currently  15. No - Do you have any artifical heart valves or other implanted medical devices, such as a  pacemaker, defibrillator, or continuous glucose monitor?  16. No - Do you have any artifical joints?  17. No - Are you allergic to latex?  18. No - Is there any chance that you may be pregnant?    Health Care Directive:  Patient does not have a Health Care Directive or Living Will: Discussed advance care planning with patient; however, patient declined at this time.    Preoperative Review of :   reviewed - controlled substances prescribed by other outside provider(s). as reflected in medication list.      Status of Chronic Conditions:  See problem list for active medical problems.  Problems all longstanding and stable, except as noted/documented.  See ROS for pertinent symptoms related to these conditions.    DIABETES - Patient has a longstanding history of DiabetesType Type II . Patient is being treated with diet, oral agents, SMBG and insulin injections and denies significant side effects. Control has been good. Complicating factors include but are not limited to: hypertension and hyperlipidemia.     HYPERTENSION - Patient has longstanding history of HTN , currently denies any symptoms referable to elevated blood pressure. Specifically denies chest pain, palpitations, dyspnea, orthopnea, PND or peripheral edema. Blood pressure readings have been in normal range. Current medication regimen is as listed below. Patient denies any side effects of medication.     History of traumatic brain injury  Lumbar radiculopathy  -pain with limited mobility related to these diagnoses.  He has difficulty rising from a chair.  He has had a lift chair in the past but this is no longer functioning and he is in the process of obtaining a new lift chair.  There is paperwork to be completed for this and that will be done in forwarded for him to [facility].  He has been involved in physical therapy in the past with limited improvement in his strength and coordination.  He does have a scooter for mobility concerns and uses a cane  upon rising from a seated position in the home.    Sleep apnea: He is currently without his CPAP machine but is in the process of testing for a new one.    Review of Systems  CONSTITUTIONAL: NEGATIVE for fever, chills, change in weight  INTEGUMENTARY/SKIN: NEGATIVE for worrisome rashes, moles or lesions  ENT/MOUTH: NEGATIVE for ear, mouth and throat problems  RESP: NEGATIVE for significant cough or SOB  CV: NEGATIVE for chest pain, palpitations or peripheral edema  GI: NEGATIVE for nausea, abdominal pain, heartburn, or change in bowel habits  : NEGATIVE for frequency, dysuria, or hematuria  MUSCULOSKELETAL: Chronic pain lumbar spine limited mobility.  NEURO: Weakness lower extremities limited mobility.  ENDOCRINE: NEGATIVE for temperature intolerance, skin/hair changes  HEME: NEGATIVE for bleeding problems  PSYCHIATRIC: NEGATIVE for changes in mood or affect    Patient Active Problem List    Diagnosis Date Noted     Other chronic pain 03/18/2019     Priority: Medium     Homeless 08/02/2018     Priority: Medium     Cervical radiculopathy 02/18/2016     Priority: Medium     Last Assessment & Plan:   MRI reviewed with patient.  Nl UE neuro exam. Neck and shoulder pain may be radicular.  Likely djd of c spine contributing.  Referral to int pain clinic for eval of REY and/or facet arthropathy injection.   -cont with [facility]   -reorder referral to int pain clinic  -cont current dose narcotics. Consider further taper  -consider increase in neurontin  -PT eval and tx  -cont integrative med -appreciate their input  -heat/ice for chronic neck pain  -start tppical lidocaine       Insomnia due to medical condition 02/04/2016     Priority: Medium     Overview:   Multifactorial insomnia    Last Assessment & Plan:   Multifactorial insomnia. [place] [facility] [place] Currently tx'd with eszopiclone and neurontin at night. Has mood do and chronic pain along with Alcides.  On cpap. Discuss further at future visit. Denies etoh use.    Consider referral to psychology for CBT.        Chronic pain disorder 02/04/2016     Priority: Medium     Last Assessment & Plan:   A: Pt with degenerative disc disease of cervical and lumbar spine. C/o ongoing pain from bilateral lower buttocks and into legs, R>L. Continues on morphine and Percocet. Also using Voltaren cream but has not yet picked up lidocaine cream from pharmacy. Seen in [facility] on 3/11, recommended PT. Needs to be seen in Interventional Pain Clinic.  No clear change in pain with reduction in mscontin 30mg from tid to bid   Spends much of his day in bed  P:  - Continue ms contin 30mg bid, percocet 4 tabs per day  - reduce dose further at next visit per taper schedule developed by pharmD  -consider uptitration of neurontin as reduce narcotic  -consider cymbalta  - Will give him 1-week refill of Percocet and then refill all medications on 3/24 to get him back on track  - [facility] ordered BUE and BLE to reeval his limb pain  - Given referral to [facility] rx and PT for further evaluation and treatment- help mobilize patient  - Instructed pt to f/u in Interventional Pain Clinic  - Continue f/u in [facility]-appreiciate in [facility]  -[facility]  -start topical lidocaine       Diabetes mellitus, type II (H) 06/26/2012     Priority: Medium     Last Assessment & Plan:   A: Last HgbA1C 7.6% on 02/03/2016. Compliant with metformin 1000mg BID  P:  -continue metformin 1000mg BID       Headache, occipital 01/28/2010     Priority: Medium     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 03/18/2008     Priority: Medium     Last Assessment & Plan:   A: Continues on gabapentin 600 mg nightly.     P:  - Continue current medication, given refill       Essential hypertension 03/10/2008     Priority: Medium     Last Assessment & Plan:   Good control with Norvasc and bber. Continue current meds. F/u with cardiology as scheduled. No anginal symptoms.        EDWINA (obstructive sleep apnea) 10/09/2007  "    Priority: Medium     Last Assessment & Plan:   Followed by sleep clinic. Per Jan 2015 note: \"Obstructive Sleep Apnea  He reports using his CPAP on a nightly basis and is not reporting any difficulties with loud snoring, gasping or snort/gasp arousals. He is not sure how much CPAP is helping because he also has complaints of chronic insomnia. A download of his machine was not available.\"  Encourage f/u with sleep clinic       Tobacco dependence 07/12/2007     Priority: Medium     Last Assessment & Plan:   Still smoking regularly. No cough,sob. Nl lung exam. Follow. Discuss nicotine replacement. Consider PharmD referral.        Hyperlipidemia 07/12/2007     Priority: Medium     Last Assessment & Plan:   Recheck lipids to check response to lipitor 40mg daily (tolerating).        GERD (gastroesophageal reflux disease) 07/12/2007     Priority: Medium     Last Assessment & Plan:   Remains off etoh. No nsaids other than asa. Taking ppi appropriately.  No red flag symptoms. Cont ppi daily        Depression with anxiety 07/12/2007     Priority: Medium     Last Assessment & Plan:   Will discuss on future visit. Will benefit from referral to psychology given TBI, chronic pain, longstanding narcotic use. Mood seems stable. Easily frustrated when discussing his narcotic refill and current situation of having to [place].        Traumatic brain injury (H) 06/12/2007     Priority: Medium     Overview:   September 2006    Last Assessment & Plan:   A: H/o TBI occuring 9/2006. Will need to review chart.  Consider neuropsych testing.  Given this history goal will be to limit sedating medications. Hx of etoh dependence. On disability.  Denies chronic HA/nausea.  P:  -Referral to [facility]   -OT  -cont to work on polypharmacy- limit narcotics       Adhesive capsulitis of shoulder 04/17/2007     Priority: Medium     Other, mixed, or unspecified " nondependent drug abuse, continuous 07/30/2004     Priority: Medium      Past Medical History:   Diagnosis Date     Allergic state      Diabetes (H)      Displacement of cervical intervertebral disc without myelopathy      Hypertension      Osteoarthrosis, unspecified whether generalized or localized, unspecified site      Other, mixed, or unspecified nondependent drug abuse, continuous      History reviewed. No pertinent surgical history.  Current Outpatient Medications   Medication Sig Dispense Refill     alcohol swab prep pads Use to swab area of injection/demarco as directed. 100 each 3     amLODIPine (NORVASC) 10 MG tablet Take 1 tablet (10 mg) by mouth daily 90 tablet 1     aspirin 81 MG chewable tablet CHEW AND SWALLOW 1 TABLET(81 MG) BY MOUTH DAILY 36 tablet 0     atorvastatin (LIPITOR) 40 MG tablet Take 1 tablet (40 mg) by mouth daily 90 tablet 3     calcium carbonate (OS-LORIN) 1500 (600 Ca) MG tablet TAKE 1 TABLET BY MOUTH TWICE DAILY WITH MEALS 60 tablet 3     gabapentin (NEURONTIN) 300 MG capsule Take 3 capsules (900 mg) by mouth At Bedtime 90 capsule 1     insulin aspart (NOVOLOG FLEXPEN) 100 UNIT/ML pen Check blood sugar three times a day Sliding scale insulin:    150-200   1 unit,  201-250   2 units,  251-300    3 units,  301-350  4 units,  Over 350   5 units   Max daily units:  10/day 3 mL 1     insulin pen needle (ULTICARE MICRO) 32G X 4 MM miscellaneous Use 3 pen needles daily or as directed. 100 each 3     magnesium oxide 400 MG CAPS Take 1 capsule by mouth daily 90 capsule 3     metFORMIN (GLUCOPHAGE) 500 MG tablet TAKE 3 TABLETS BY MOUTH IN THE MORNING AND 2 TABLETS BY MOUTH IN THE EVENING. 450 tablet 1     methylphenidate (RITALIN) 10 MG tablet Take 10 mg by mouth       metoprolol tartrate (LOPRESSOR) 25 MG tablet Take 0.5 tablets (12.5 mg) by mouth 2 times daily       multivitamin w/minerals (THERA-VIT-M) tablet Take 1 tablet by mouth daily 90 tablet 3     order for DME Equipment being ordered:  "Scooter - repair.    Patient continues to need and use his scooter daily.  The scooter will continue to need repairs and is medically necessary for the next 12 months 1 each 0     order for DME Equipment being ordered: Hospital Bed 1 each 0     order for DME Equipment being ordered: Glucometer per insurance preference, lancets, test strips.  Patient to check sugars 4 times daily, 90 day supply for test strips and lancets, refill 3 times 1 Device 0     zolpidem (AMBIEN) 5 MG tablet TAKE 1 TABLET BY MOUTH AT BEDTIME AS NEEDED FOR SLEEP       blood glucose (NO BRAND SPECIFIED) lancets standard Use to test blood sugar 4 times daily or as directed. (Patient not taking: Reported on 3/1/2021) 500 each 5     blood glucose (NO BRAND SPECIFIED) test strip Use to test blood sugar 4 times daily or as directed. (Patient not taking: Reported on 3/1/2021) 400 strip 3       Allergies   Allergen Reactions     Dust Mite Extract Unknown     Meclizine Other (See Comments)     Described \"feeling funny and burning.\"     Poplar Bud Extract Unknown     Trazodone Derivatives Unknown     \"Puts me in a coma\"        Social History     Tobacco Use     Smoking status: Current Every Day Smoker     Smokeless tobacco: Never Used   Substance Use Topics     Alcohol use: No     History reviewed. No pertinent family history.  History   Drug Use No         Objective     /62   Pulse 72   Temp 97.8  F (36.6  C) (Oral)   Resp 16   Wt 60.9 kg (134 lb 3.2 oz)   SpO2 98%   BMI 23.04 kg/m      Physical Exam    GENERAL APPEARANCE: alert and no distress     EYES: PERRL, lids and lashes normal and pterygium right eye     HENT: ear canals and TM's normal and nose and mouth without ulcers or lesions     NECK: no adenopathy, no asymmetry, masses, or scars and thyroid normal to palpation     RESP: lungs clear to auscultation - no rales, rhonchi or wheezes     CV: regular rates and rhythm, normal S1 S2, no S3 or S4 and no murmur, click or rub     ABDOMEN:  " soft, nontender, no HSM or masses and bowel sounds normal     MS: extremities normal- no gross deformities noted and tender to palpation lumbar spine     SKIN: no suspicious lesions or rashes     PSYCH: mentation appears normal. and affect normal/bright     LYMPHATICS: No cervical adenopathy    Recent Labs   Lab Test 09/21/20  1416 12/05/19  1141   HGB  --  13.6   PLT  --  323    141   POTASSIUM 4.4 4.6   CR 0.88 0.86   A1C 6.6* 6.8*        Diagnostics:  Recent Results (from the past 24 hour(s))   Hemoglobin A1c    Collection Time: 05/13/21  3:14 PM   Result Value Ref Range    Hemoglobin A1C 7.3 (H) 0 - 5.6 %      No EKG required, no history of coronary heart disease, significant arrhythmia, peripheral arterial disease or other structural heart disease.    Revised Cardiac Risk Index (RCRI):  The patient has the following serious cardiovascular risks for perioperative complications:   - Diabetes Mellitus (on Insulin) = 1 point     RCRI Interpretation: 1 point: Class II (low risk - 0.9% complication rate)           Signed Electronically by: Dinorah Correa MD  Copy of this evaluation report is provided to requesting physician.

## 2021-05-13 NOTE — PATIENT INSTRUCTIONS
Proceed with surgery as planned.  Do not take the Metformin on AM of surgery.  Stop the aspirin for 1 week prior to surgery.        Preparing for Your Surgery  Getting started  A nurse will call you to review your health history and instructions. They will give you an arrival time based on your scheduled surgery time.  Please be ready to share the following:    Your doctor's clinic name and phone number    Your medical, surgical and anesthesia history    A list of allergies and sensitivities    A list of medicines, including herbal treatments and over-the-counter drugs    Whether the patient has a legal guardian (ask how to send us the papers in advance)  If you have a child who's having surgery, please ask for a copy of Preparing for Your Child's Surgery.    Preparing for surgery    Within 30 days of surgery: Have a pre-op exam (sometimes called an H&P, or History and Physical). This can be done at a clinic or pre-operative center.  ? If you're having a , you may not need this exam. Talk to your care team    At your pre-op exam, talk to your care team about all medicines you take. If you need to stop any medicines before surgery, ask when to start taking them again.  ? We do this for your safety. Many medicines can make you bleed too much during surgery. Some change how well surgery (anesthesia) drugs work.    Call your insurance company to let them know you're having surgery. (If you don't have insurance, call [phone].)    Call your clinic if there's any change in your health. This includes signs of a cold or flu (sore throat, runny nose, cough, rash, fever). It also includes a scrape or scratch near the surgery site.    If you have questions on the day of surgery, call your hospital or surgery center.  Eating and drinking guidelines  For your safety: Unless your surgeon tells you otherwise, follow the guidelines below.    Eat and drink as usual until 8 hours before surgery. After that, no food or  milk.    Drink clear liquids until 2 hours before surgery. These are liquids you can see through, like water, Gatorade and Propel Water. You may also have black coffee and tea (no cream or milk).    Nothing by mouth within 2 hours of surgery. This includes gum, candy and breath mints.    If you drink, stop drinking alcohol the night before surgery.    If your care team tells you to take medicine on the morning of surgery, it's okay to take it with a sip of water.  Preventing infection    Shower or bathe the night before and morning of your surgery. Follow the instructions your clinic gave you. (If no instructions, use regular soap.)    Don't shave or clip hair near your surgery site. We'll remove the hair if needed.    Don't smoke or vape the morning of surgery. You may chew nicotine gum up to 2 hours before surgery. A nicotine patch is okay.  ? Note: Some surgeries require you to completely quit smoking and nicotine. Check with your surgeon.    Your care team will make every effort to keep you safe from infection. We will:  ? Clean our hands often with soap and water (or an alcohol-based hand rub).  ? Clean the skin at your surgery site with a special soap that kills germs.  ? Give you a special gown to keep you warm. (Cold raises the risk of infection.)  ? Wear special hair covers, masks, gowns and gloves during surgery.  ? Give antibiotic medicine, if prescribed. Not all surgeries need antibiotics.  What to bring on the day of surgery    Photo ID and insurance card    Copy of your health care directive, if you have one    Glasses and hearing aides (bring cases)  ? You can't wear contacts during surgery    Inhaler and eye drops, if you use them (tell us about these when you arrive)    CPAP machine or breathing device, if you use them    A few personal items, if spending the night    If you have . . .  ? A pacemaker or ICD (cardiac defibrillator): Bring the ID card.  ? An implanted stimulator: Bring the remote  control.  ? A legal guardian: Bring a copy of the certified (court-stamped) guardianship papers.  Please remove any jewelry, including body piercings. Leave jewelry and other valuables at home.  If you're going home the day of surgery  Important: If you don't follow the rules below, we must cancel your surgery.     Arrange for someone to drive you home after surgery. You may not drive, take a taxi or take public transportation by yourself (unless you'll have local anesthesia only).    Arrange for a responsible adult to stay with you overnight. If you don't, we may keep you in the hospital overnight, and you may need to pay the costs yourself.  Questions?   If you have any questions for your care team, list them here: _________________________________________________________________________________________________________________________________________________________________________________________________________________________________________________________________________________________________________________________  For informational purposes only. Not to replace the advice of your health care provider. Copyright   2003, 2019 [facility]. All rights reserved. Clinically reviewed by Marian Bishop MD. [employer] [id number] - REV 4/20.

## 2021-05-14 NOTE — TELEPHONE ENCOUNTER
Add [facility] ID # to form and mail to address on bottom of page.    Fax Special Diet Information Request Form

## 2021-05-14 NOTE — TELEPHONE ENCOUNTER
Dr. Correa [facility] ID added. Original form faxed to [facility] [phone]. Sent for immediate abstraction.

## 2021-05-17 NOTE — RESULT ENCOUNTER NOTE
Your long term blood sugar testing is higher than previous but remains in the acceptable range.  Please call or MyChart message me if you have any questions.    TALIBK

## 2021-06-02 ENCOUNTER — TELEPHONE (OUTPATIENT)
Dept: AUDIOLOGY | Facility: CLINIC | Age: 66
End: 2021-06-02

## 2021-06-02 ENCOUNTER — RECORDS - HEALTHEAST (OUTPATIENT)
Dept: ADMINISTRATIVE | Facility: CLINIC | Age: 66
End: 2021-06-02

## 2021-06-02 NOTE — TELEPHONE ENCOUNTER
Reason for Call:  Other     Detailed comments: please call. Patient needing new hearing aid batteries    Phone Number Patient can be reached at: Home number on file [phone] (home) or [place]  [phone]    Best Time: any    Can we leave a detailed message on this number? YES    Call taken on 6/2/2021 at 3:49 PM by Radha Trevino

## 2021-06-03 NOTE — TELEPHONE ENCOUNTER
I spoke with Faheem and he will  his 90 day supply of batteries at out appointment on the 7th.     -Bib Mccall CCC-A

## 2021-06-07 ENCOUNTER — TELEPHONE (OUTPATIENT)
Dept: AUDIOLOGY | Facility: CLINIC | Age: 66
End: 2021-06-07

## 2021-06-07 NOTE — TELEPHONE ENCOUNTER
I returned call and spoke with patient.  He needs to have one of his care helpers contact us to schedule an appointment and arrange transportation at the same time.  He needs batteries but is also having problems with  his hearing aids and needs to see an audiologist.  He will need to schedule a hearing aid check appointment but was  unwilling to do that today on the phone with me.    Jono Frances MA, CCC-A  [place] Licensed Audiologist #[id number]  [facility]        6/7/2021          Patient was coming to see Bib Schneider today with hearing aid problems. It has been cancelled. Is there anyone that can help him with batteries? He has to arrange rides. Ok to leave a detailed message.

## 2021-06-09 ENCOUNTER — TELEPHONE (OUTPATIENT)
Dept: AUDIOLOGY | Facility: CLINIC | Age: 66
End: 2021-06-09

## 2021-06-09 ENCOUNTER — TELEPHONE (OUTPATIENT)
Dept: AUDIOLOGY | Facility: CLINIC | Age: 66
End: 2021-06-09
Payer: MEDICARE

## 2021-06-09 DIAGNOSIS — H90.6 MIXED HEARING LOSS, BILATERAL: Primary | ICD-10-CM

## 2021-06-09 PROCEDURE — 99207 PR NO CHARGE LOS: CPT | Performed by: AUDIOLOGIST

## 2021-06-09 PROCEDURE — V5266 BATTERY FOR HEARING DEVICE: HCPCS | Mod: GY | Performed by: AUDIOLOGIST

## 2021-06-09 NOTE — TELEPHONE ENCOUNTER
I returned patient's call and left message.  Batteries may be picked up M-F at the  on the 2nd floor in [place].    Jono Frances MA, CCC-A  [place] Licensed Audiologist #[id number]  [facility]        6/9/2021        Reason for Call:  Other - Requesting Hearing Aid Batteries    Detailed comments: Patient called along with Moiraine from outreach program that works with him. Mariaelenashine is trying to help arrange for patient to  hearing aid batteries. Patient stated he does not need an appointment, he only wants the batteries. Patient expressed multiple times how urgent this is and he wants to know when he can come to the clinic to  the batteries. Informed patient that Bib Kent is not at [place] location today but he will be back in tomorrow. Please advise and call patient back to discuss as soon as possible     Phone Number Patient can be reached at: Home number on file [phone] (home)    Best Time: Anytime    Can we leave a detailed message on this number? YES    Call taken on 6/9/2021 at 2:42 PM by Akanksha Toth

## 2021-06-29 ENCOUNTER — OFFICE VISIT (OUTPATIENT)
Dept: AUDIOLOGY | Facility: CLINIC | Age: 66
End: 2021-06-29
Payer: MEDICARE

## 2021-06-29 DIAGNOSIS — H90.6 MIXED HEARING LOSS, BILATERAL: Primary | ICD-10-CM

## 2021-06-29 PROCEDURE — V5266 BATTERY FOR HEARING DEVICE: HCPCS | Performed by: AUDIOLOGIST

## 2021-06-29 PROCEDURE — 92592 PR HEARING AID CHECK, MONAURAL: CPT | Performed by: AUDIOLOGIST

## 2021-06-29 PROCEDURE — 99207 PR NO CHARGE LOS: CPT | Performed by: AUDIOLOGIST

## 2021-06-29 NOTE — PROGRESS NOTES
AUDIOLOGY REPORT: HEARING AID RECHECK    SUBJECTIVE: Faheem Jeff is a 66 year old male, :  1955, was seen in the Audiology Clinic at [facility] on 21 for a return check of their hearing aids. Patient was unaccompanied to today's visit.       Background:   Patient is here today with the complaint of aids needs cleaning.     Procedures:                                        SIDE: Both                          : Phonak                          TYPE: Virto B70-13 & CROS ITE                          S/N:                                       R: [id number]                                      L: [id number]                          WARRANTY: 2023     Today we cleaned the hearing aid and CROS. Biologic listening check found the hearing aid sounding crisp and clear and the CROS was transmitting well. Patient requested his 90 day supply of batteries and 30 size 13 were provided to him.     ** Otoscopic inspection found a dark wet substance in his right ear.     Plan:   Patient will return as needed for hearing aid concerns. Recommended an appointment with ENT to evaluate the right ear.     CHARGES:   06317 Monaural hearing aid check & [id number] x30 Batteries    Bib Mccall [facility]  Licensed Audiologist #[id number]  2021

## 2021-07-17 DIAGNOSIS — E11.9 TYPE 2 DIABETES MELLITUS WITHOUT COMPLICATION, WITHOUT LONG-TERM CURRENT USE OF INSULIN (H): ICD-10-CM

## 2021-07-19 NOTE — PROGRESS NOTES
History of Present Illness - Faheem Jeff is a 66 year old male here to see me in follow up from 5/292020 for an ear issue. He has seen Dr Cai in the past, for sensorineural hearing loss as well.    To review his situation, he has hearing loss in both ears, much worse in the right.  It has been present and noticeable for approximately many years.  It was not sudden in onset.  The patient has noticed increased difficulty hearing certain sounds and difficulty in understanding others, especially in noisy or crowded situations.  There is no history of recent head trauma, or chronic ear disease or ear surgery.  With regards to recreational, , and work-related noise exposure he has a lot for decades. No family history of hearing loss at a young age.      An audiogram and tympanogram were performed 1/24/2020 as part of the evaluation and personally reviewed. He has moderate sensorineural hearing loss left ear, but an asymmetric, much worse down-sloping to profound sensorineural hearing loss right ear with extremely poor word recognition scores on the right.  To rule out retrocochlear pathology, Dr. Cai recommended an MRI, but it was never done.     The patient comes back to me today wanting to ask more questions about his hearing loss.  He tells me that the hearing is still an issue.  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    He comes back today because he was noticing that even with his hearing aid the RIGHT ear was very stuffy and had poor hearing.    Past medical history -   Patient Active Problem List   Diagnosis     Other, mixed, or unspecified nondependent drug abuse, continuous     Tobacco dependence     Traumatic brain injury (H)     EDWINA (obstructive sleep apnea)     Lumbar radicular pain     Insomnia due to medical condition     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     Hyperlipidemia     Headache, occipital     Essential  hypertension     GERD (gastroesophageal reflux disease)     Diabetes mellitus, type II (H)     Depression with anxiety     Chronic pain disorder     Cervical radiculopathy     Adhesive capsulitis of shoulder     Homeless     Other chronic pain       /67 (BP Location: Left arm, Patient Position: Sitting, Cuff Size: Adult Regular)   Pulse 58   Wt 60.8 kg (134 lb)   SpO2 99%   BMI 23.00 kg/m      General - The patient is well nourished and well developed, and appears to have good nutritional status.  Alert and oriented to person and place, answers questions and cooperates with examination appropriately.   Head and Face - Normocephalic and atraumatic, with no gross asymmetry noted of the contour of the facial features.  The facial nerve is intact, with strong symmetric movements.  Eyes - Extraocular movements intact, and the pupils were reactive to light.  Sclera were not icteric or injected, conjunctiva were pink and moist.    Cerumen Removal    Physical Exam and Procedure  Ears - On examination of the ears, I found that the RIGHT sides had cerumen impaction.  Therefore, I positioned them in the examination chair in a semi-supine position, beginning with the right side.  I used the binocular surgical microscope to perform cerumen removal.  I began by using a cerumen loop to gently lift the edges of the cerumen mass away from the walls of the external canal.  Once I did this, I was able to suction away fragments of wax and debris using suction.  Once the mass was loose enough, the entire plug was pulled from the canal.  The tympanic membrane was intact, no sign of perforation or middle ear effusion.    The LEFT ear was clean and clear and healthy.    A/P - Faheem Jeff  (H90.5) SNHL (sensory-neural hearing loss), asymmetrical  (primary encounter diagnosis)  (H61.21) Impacted cerumen of right ear    The patient has had their cerumen procedurally removed today.  I have discussed ear care at home, including  avoiding qtips or any other object placed in the canal.  I have also discussed that over the counter cerumen kits like Debrox or Cerumenex can be useful.    If no other issues, follow up with me in one year for an ear check.

## 2021-07-22 ENCOUNTER — OFFICE VISIT (OUTPATIENT)
Dept: OTOLARYNGOLOGY | Facility: CLINIC | Age: 66
End: 2021-07-22
Payer: MEDICARE

## 2021-07-22 VITALS
HEART RATE: 58 BPM | DIASTOLIC BLOOD PRESSURE: 67 MMHG | BODY MASS INDEX: 23 KG/M2 | WEIGHT: 134 LBS | OXYGEN SATURATION: 99 % | SYSTOLIC BLOOD PRESSURE: 121 MMHG

## 2021-07-22 DIAGNOSIS — H90.3 SNHL (SENSORY-NEURAL HEARING LOSS), ASYMMETRICAL: Primary | ICD-10-CM

## 2021-07-22 DIAGNOSIS — H61.21 IMPACTED CERUMEN OF RIGHT EAR: ICD-10-CM

## 2021-07-22 PROCEDURE — 99213 OFFICE O/P EST LOW 20 MIN: CPT | Mod: 25 | Performed by: OTOLARYNGOLOGY

## 2021-07-22 PROCEDURE — 69210 REMOVE IMPACTED EAR WAX UNI: CPT | Performed by: OTOLARYNGOLOGY

## 2021-07-22 NOTE — NURSING NOTE
"Chief Complaint   Patient presents with     Cerumen Impaction     ear cleaning       Vitals:    07/22/21 1341   BP: 121/67   BP Location: Left arm   Patient Position: Sitting   Cuff Size: Adult Regular   Pulse: 58   SpO2: 99%   Weight: 60.8 kg (134 lb)     Wt Readings from Last 1 Encounters:   07/22/21 60.8 kg (134 lb)     Ht Readings from Last 1 Encounters:   03/01/21 1.626 m (5' 4\")       Mine Romero [facility], 7/22/2021 1:41 PM    "

## 2021-07-22 NOTE — LETTER
7/22/2021         RE: Faheem Jeff  [address]  [place] [address]        Dear Colleague,    Thank you for referring your patient, Faheem Jeff, to the [facility]. Please see a copy of my visit note below.    History of Present Illness - Faheem Jeff is a 66 year old male here to see me in follow up from 5/292020 for an ear issue. He has seen Dr Cai in the past, for sensorineural hearing loss as well.    To review his situation, he has hearing loss in both ears, much worse in the right.  It has been present and noticeable for approximately many years.  It was not sudden in onset.  The patient has noticed increased difficulty hearing certain sounds and difficulty in understanding others, especially in noisy or crowded situations.  There is no history of recent head trauma, or chronic ear disease or ear surgery.  With regards to recreational, , and work-related noise exposure he has a lot for decades. No family history of hearing loss at a young age.      An audiogram and tympanogram were performed 1/24/2020 as part of the evaluation and personally reviewed. He has moderate sensorineural hearing loss left ear, but an asymmetric, much worse down-sloping to profound sensorineural hearing loss right ear with extremely poor word recognition scores on the right.  To rule out retrocochlear pathology, Dr. Cai recommended an MRI, but it was never done.     The patient comes back to me today wanting to ask more questions about his hearing loss.  He tells me that the hearing is still an issue.  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    He comes back today because he was noticing that even with his hearing aid the RIGHT ear was very stuffy and had poor hearing.    Past medical history -   Patient Active Problem List   Diagnosis     Other, mixed, or unspecified nondependent drug abuse, continuous     Tobacco  dependence     Traumatic brain injury (H)     EDWINA (obstructive sleep apnea)     Lumbar radicular pain     Insomnia due to medical condition     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     Hyperlipidemia     Headache, occipital     Essential hypertension     GERD (gastroesophageal reflux disease)     Diabetes mellitus, type II (H)     Depression with anxiety     Chronic pain disorder     Cervical radiculopathy     Adhesive capsulitis of shoulder     Homeless     Other chronic pain       /67 (BP Location: Left arm, Patient Position: Sitting, Cuff Size: Adult Regular)   Pulse 58   Wt 60.8 kg (134 lb)   SpO2 99%   BMI 23.00 kg/m      General - The patient is well nourished and well developed, and appears to have good nutritional status.  Alert and oriented to person and place, answers questions and cooperates with examination appropriately.   Head and Face - Normocephalic and atraumatic, with no gross asymmetry noted of the contour of the facial features.  The facial nerve is intact, with strong symmetric movements.  Eyes - Extraocular movements intact, and the pupils were reactive to light.  Sclera were not icteric or injected, conjunctiva were pink and moist.    Cerumen Removal    Physical Exam and Procedure  Ears - On examination of the ears, I found that the RIGHT sides had cerumen impaction.  Therefore, I positioned them in the examination chair in a semi-supine position, beginning with the right side.  I used the binocular surgical microscope to perform cerumen removal.  I began by using a cerumen loop to gently lift the edges of the cerumen mass away from the walls of the external canal.  Once I did this, I was able to suction away fragments of wax and debris using suction.  Once the mass was loose enough, the entire plug was pulled from the canal.  The tympanic membrane was intact, no sign of perforation or middle ear effusion.    The LEFT ear was clean and clear and healthy.    A/P - Faheem MERCHANT  Tomer  (H90.5) SNHL (sensory-neural hearing loss), asymmetrical  (primary encounter diagnosis)  (H61.21) Impacted cerumen of right ear    The patient has had their cerumen procedurally removed today.  I have discussed ear care at home, including avoiding qtips or any other object placed in the canal.  I have also discussed that over the counter cerumen kits like Debrox or Cerumenex can be useful.    If no other issues, follow up with me in one year for an ear check.        Again, thank you for allowing me to participate in the care of your patient.        Sincerely,        Zack Rogers MD

## 2021-08-18 ENCOUNTER — TELEPHONE (OUTPATIENT)
Dept: FAMILY MEDICINE | Facility: CLINIC | Age: 66
End: 2021-08-18

## 2021-08-18 NOTE — TELEPHONE ENCOUNTER
Patient Quality Outreach      Summary:    Patient has the following on his problem list/HM:     Diabetes    Last A1C:  Lab Results   Component Value Date    A1C 7.3 05/13/2021    A1C 6.6 09/21/2020       Last LDL:    Lab Results   Component Value Date     09/21/2020       Is the patient on a Statin? Yes          Is the patient on Aspirin? Yes    Medications     HMG CoA Reductase Inhibitors     atorvastatin (LIPITOR) 40 MG tablet       Salicylates     aspirin 81 MG chewable tablet             Last three blood pressure readings:  BP Readings from Last 3 Encounters:   07/22/21 121/67   05/13/21 132/62   09/21/20 136/72            Tobacco Use      Smoking status: Current Every Day Smoker      Smokeless tobacco: Never Used          Patient is due/failing the following:   Colonoscopy    Type of outreach:    Sent [employer] message.    Questions for provider review:    None                                                                                                                                     Leana Singh

## 2021-09-17 ENCOUNTER — TELEPHONE (OUTPATIENT)
Dept: FAMILY MEDICINE | Facility: CLINIC | Age: 66
End: 2021-09-17

## 2021-09-17 NOTE — TELEPHONE ENCOUNTER
Reason for Call:  Other     Detailed comments: the patient lost hi left ear hearing aid and would like to talk to a nurse     Phone Number Patient can be reached at: Home number on file [phone] (home)     Best Time: any    Can we leave a detailed message on this number? YES    Call taken on 9/17/2021 at 1:31 PM by Yu Drummond

## 2021-09-18 ENCOUNTER — HEALTH MAINTENANCE LETTER (OUTPATIENT)
Age: 66
End: 2021-09-18

## 2021-09-20 NOTE — TELEPHONE ENCOUNTER
I returned Patient's call and spoke with him.  He has lost his left hearing aid, Phonak B70-13 ITE.  The aid is in warranty and has not been replaced under L&D.  I will order a replacement from Phonak and call the patient for pickup once the aid arrives.  I told him it may be 4 weeks before it comes in and that I may need to program the left and right side together for proper functionality.  Patient understands and will await our call.    Jono Frances MA, CCC-A  [place] Licensed Audiologist #[id number]  [facility]        9/20/2021

## 2021-10-01 DIAGNOSIS — E78.5 HYPERLIPIDEMIA, UNSPECIFIED HYPERLIPIDEMIA TYPE: ICD-10-CM

## 2021-10-01 NOTE — TELEPHONE ENCOUNTER
Routing refill request to provider for review/approval because:  Labs not current:  lipids  Deepali Gomez BSN, RN

## 2021-10-03 RX ORDER — ATORVASTATIN CALCIUM 40 MG/1
40 TABLET, FILM COATED ORAL DAILY
Qty: 30 TABLET | Refills: 0 | Status: SHIPPED | OUTPATIENT
Start: 2021-10-03 | End: 2022-02-05

## 2021-10-12 ENCOUNTER — TELEPHONE (OUTPATIENT)
Dept: AUDIOLOGY | Facility: CLINIC | Age: 66
End: 2021-10-12

## 2021-10-12 NOTE — TELEPHONE ENCOUNTER
Called patient and left a non-detailed message requesting a call back. His left hearing aid has been replaced, but because it is part of a CROS system, it would be best if it he makes an appointment and has the hearing aid and CROS sides programmed together to make sure they communicate.    Also called patient's , Leigh Ann, and left a message with this information.    Lilian Warner, CCC-A  [place] Licensed Audiologist #[id number]  10/12/2021

## 2021-10-26 DIAGNOSIS — M54.12 CERVICAL RADICULOPATHY: ICD-10-CM

## 2021-10-27 RX ORDER — GABAPENTIN 300 MG/1
900 CAPSULE ORAL AT BEDTIME
Qty: 90 CAPSULE | Refills: 0 | Status: SHIPPED | OUTPATIENT
Start: 2021-10-27 | End: 2021-11-01

## 2021-10-30 DIAGNOSIS — M54.12 CERVICAL RADICULOPATHY: ICD-10-CM

## 2021-11-01 RX ORDER — GABAPENTIN 300 MG/1
900 CAPSULE ORAL AT BEDTIME
Qty: 90 CAPSULE | Refills: 0 | Status: SHIPPED | OUTPATIENT
Start: 2021-11-01 | End: 2022-02-05

## 2021-11-02 ENCOUNTER — TELEPHONE (OUTPATIENT)
Dept: FAMILY MEDICINE | Facility: CLINIC | Age: 66
End: 2021-11-02

## 2021-11-02 DIAGNOSIS — S06.9X9D TRAUMATIC BRAIN INJURY WITH LOSS OF CONSCIOUSNESS, SUBSEQUENT ENCOUNTER: Primary | ICD-10-CM

## 2021-11-02 DIAGNOSIS — M54.16 LUMBAR RADICULAR PAIN: ICD-10-CM

## 2021-11-02 NOTE — TELEPHONE ENCOUNTER
Order pended. Patient needs his scooter repaired.  [facility] need stating that he still needs his scooter.  They are not able to work on his scooter unless have prescription stating patient still needs it.  Please fax to Leigh Ann at [employer].  See # under contacts.

## 2021-11-04 ENCOUNTER — OFFICE VISIT (OUTPATIENT)
Dept: AUDIOLOGY | Facility: CLINIC | Age: 66
End: 2021-11-04
Payer: COMMERCIAL

## 2021-11-04 DIAGNOSIS — H90.6 MIXED HEARING LOSS, BILATERAL: Primary | ICD-10-CM

## 2021-11-04 PROCEDURE — 99207 PR NO CHARGE LOS: CPT | Performed by: AUDIOLOGIST

## 2021-11-04 PROCEDURE — 92592 PR HEARING AID CHECK, MONAURAL: CPT | Performed by: AUDIOLOGIST

## 2021-11-04 NOTE — PROGRESS NOTES
AUDIOLOGY REPORT: HEARING AID RECHECK    SUBJECTIVE: Faheem Jeff is a 66 year old male, :  1955, was seen in the Audiology Clinic at [facility] on 21 for a return check of their hearing aids. Patient was unaccompanied to today's visit.       Background:   Patient is here today to have his L&D replacement hearing aid programmed and configured to his CROS.     Procedures:       SIDE: Left    : Phonak    TYPE: C[employer] B70-13    S/N: [id number]    WARRANTY: 2023 REPAIR ONLY    Today the replacement hearing aid was paired to his right ear CROS and set to match the previous hearing aid. Biologic listening check found the hearing aid sounding crisp and clear and the CROS transmitting well.     Plan:   Patient will return as needed for hearing aid concerns.     CHARGES:   32672 Monaural hearing aid check     Bib Mccall [facility]  Licensed Audiologist #[id number]  2021

## 2021-11-30 ENCOUNTER — TELEPHONE (OUTPATIENT)
Dept: FAMILY MEDICINE | Facility: CLINIC | Age: 66
End: 2021-11-30
Payer: MEDICARE

## 2021-11-30 DIAGNOSIS — S06.9X9D TRAUMATIC BRAIN INJURY WITH LOSS OF CONSCIOUSNESS, SUBSEQUENT ENCOUNTER: Primary | ICD-10-CM

## 2021-11-30 NOTE — TELEPHONE ENCOUNTER
New script for scooter repair.  Script currently is outdated please add that patient is dependent on this.    Please advise     Please fax to [facility] [phone]  Attention: Haroldo Beltrán  [phone]     Ina Portillo

## 2021-12-01 NOTE — TELEPHONE ENCOUNTER
"See message below, previous order has  and new one needed. RN team unable to address DME requests, not on our protocol.  Routing to provider to review and advise.    See previous DME order under Medications tab for last \"scooter repair' order.    2020:     "

## 2021-12-14 ENCOUNTER — TELEPHONE (OUTPATIENT)
Dept: FAMILY MEDICINE | Facility: CLINIC | Age: 66
End: 2021-12-14
Payer: MEDICARE

## 2021-12-28 DIAGNOSIS — E11.9 TYPE 2 DIABETES MELLITUS WITHOUT COMPLICATION, WITHOUT LONG-TERM CURRENT USE OF INSULIN (H): ICD-10-CM

## 2021-12-30 NOTE — TELEPHONE ENCOUNTER
Routing refill request to provider for review/approval because:   Biguanide Agents Failed 12/28/2021 02:39 PM   Protocol Details  Patient has documented A1c within the specified period of time.    Patient's CR is NOT>1.4 OR Patient's EGFR is NOT<45 within past 12 mos.    Patient is age 10 or older    Patient does NOT have a diagnosis of CHF.    Medication is active on med list    Recent (6 mo) or future (30 days) visit within the authorizing provider's specialty              Karen Mane RN  [facility]

## 2021-12-30 NOTE — TELEPHONE ENCOUNTER
Willing to send just enough medication to make it until appointment with PCP mid January due to he is overdue for appointment and labs.  DAVID Diez, NP-C  [facility]

## 2022-01-05 ENCOUNTER — TELEPHONE (OUTPATIENT)
Dept: AUDIOLOGY | Facility: CLINIC | Age: 67
End: 2022-01-05
Payer: COMMERCIAL

## 2022-01-05 DIAGNOSIS — H90.6 MIXED HEARING LOSS, BILATERAL: Primary | ICD-10-CM

## 2022-01-05 PROCEDURE — V5266 BATTERY FOR HEARING DEVICE: HCPCS | Mod: GY | Performed by: AUDIOLOGIST

## 2022-01-05 PROCEDURE — 99207 PR NO CHARGE LOS: CPT | Performed by: AUDIOLOGIST

## 2022-01-05 NOTE — TELEPHONE ENCOUNTER
"..Reason for Call:   hearing aide supplies    Detailed comments: hearing aide supplies needed/batteries and \"waxtrap\" at bottom of aides \" in a gray case\"  Asking this be mailed to home address/confirmed address.   Thank you  Phone Number Patient can be reached at: Home number on file [phone] (home)      Best Time: anytime    Can we leave a detailed message on this number? YES    Call taken on 1/5/2022 at 4:57 PM by Mimi Dempsey      "

## 2022-01-07 NOTE — TELEPHONE ENCOUNTER
Patient calls to request his 3 month supply of batteries be mailed to his home address. Mailed 36 size 13 batteries and Phonak wax guards to the address on file.     CHARGES:   [id number] x36 Batteries ($1). Total: $36 Bill to patient's insurance.    Bib Mccall CCC-A  Licensed Audiologist #[id number]  1/7/2022

## 2022-01-08 ENCOUNTER — HEALTH MAINTENANCE LETTER (OUTPATIENT)
Age: 67
End: 2022-01-08

## 2022-01-12 DIAGNOSIS — E11.9 TYPE 2 DIABETES MELLITUS WITHOUT COMPLICATION, WITHOUT LONG-TERM CURRENT USE OF INSULIN (H): ICD-10-CM

## 2022-01-13 NOTE — TELEPHONE ENCOUNTER
"Routing refill request to provider for review/approval because:  Requested Prescriptions   Pending Prescriptions Disp Refills    metFORMIN (GLUCOPHAGE) 500 MG tablet [Pharmacy Med Name: METFORMIN 500MG TABLETS] 95 tablet 0     Sig: TAKE 3 TABLETS BY MOUTH EVERY AM AND 2 TABLETS BY MOUTH EVERY PM        Biguanide Agents Failed - 1/12/2022  2:44 PM        Failed - Patient has documented A1c within the specified period of time.     If HgbA1C is 8 or greater, it needs to be on file within the past 3 months.  If less than 8, must be on file within the past 6 months.     Recent Labs   Lab Test 05/13/21  1514   A1C 7.3*             Failed - Patient's CR is NOT>1.4 OR Patient's EGFR is NOT<45 within past 12 mos.       Recent Labs   Lab Test 09/21/20  1416   GFRESTIMATED 89   GFRESTBLACK >90       Recent Labs   Lab Test 09/21/20  1416   CR 0.88             Passed - Patient is age 10 or older        Passed - Patient does NOT have a diagnosis of CHF.        Passed - Medication is active on med list        Passed - Recent (6 mo) or future (30 days) visit within the authorizing provider's specialty     Patient had office visit in the last 6 months or has a visit in the next 30 days with authorizing provider or within the authorizing provider's specialty.  See \"Patient Info\" tab in inbasket, or \"Choose Columns\" in Meds & Orders section of the refill encounter.                    Sarah PETERSEN, RN        "

## 2022-01-15 DIAGNOSIS — Z79.4 TYPE 2 DIABETES MELLITUS WITHOUT COMPLICATION, WITH LONG-TERM CURRENT USE OF INSULIN (H): ICD-10-CM

## 2022-01-15 DIAGNOSIS — E11.9 TYPE 2 DIABETES MELLITUS WITHOUT COMPLICATION, WITH LONG-TERM CURRENT USE OF INSULIN (H): ICD-10-CM

## 2022-01-17 NOTE — TELEPHONE ENCOUNTER
"Routing refill request to provider for review/approval because:    Requested Prescriptions   Pending Prescriptions Disp Refills    NOVOLOG FLEXPEN 100 UNIT/ML soln [Pharmacy Med Name: NOVOLOG FLEXPEN INJ 3ML (ORANGE)] 3 mL 1     Sig: USE THREE TIMES DAILY PER SLIDING SCALE 150-200 1 UNIT, 201-250 2 UNITS, 251-300 3 UNITS, 301-350 4 UNITS,>350 5 UNITS. MAX 10 UNITS PER DAY        Short Acting Insulin Protocol Failed - 1/15/2022  3:31 AM        Failed - Serum creatinine on file in past 12 months     Recent Labs   Lab Test 09/21/20  1416   CR 0.88       Ok to refill medication if creatinine is low          Failed - HgbA1C in past 3 or 6 months     If HgbA1C is 8 or greater, it needs to be on file within the past 3 months.  If less than 8, must be on file within the past 6 months.     Recent Labs   Lab Test 05/13/21  1514   A1C 7.3*             Passed - Medication is active on med list        Passed - Patient is age 18 or older        Passed - Recent (6 mo) or future (30 days) visit within the authorizing provider's specialty     Patient had office visit in the last 6 months or has a visit in the next 30 days with authorizing provider or within the authorizing provider's specialty.  See \"Patient Info\" tab in inbasket, or \"Choose Columns\" in Meds & Orders section of the refill encounter.                Claudia Vega RN, [facility]        "

## 2022-01-22 ENCOUNTER — HOSPITAL ENCOUNTER (EMERGENCY)
Facility: CLINIC | Age: 67
Discharge: HOME | End: 2022-01-22
Attending: EMERGENCY MEDICINE | Admitting: EMERGENCY MEDICINE
Payer: COMMERCIAL

## 2022-01-22 ENCOUNTER — APPOINTMENT (OUTPATIENT)
Dept: CT IMAGING | Facility: CLINIC | Age: 67
End: 2022-01-22
Attending: EMERGENCY MEDICINE
Payer: COMMERCIAL

## 2022-01-22 ENCOUNTER — APPOINTMENT (OUTPATIENT)
Dept: GENERAL RADIOLOGY | Facility: CLINIC | Age: 67
End: 2022-01-22
Attending: EMERGENCY MEDICINE
Payer: COMMERCIAL

## 2022-01-22 VITALS
BODY MASS INDEX: 23.17 KG/M2 | SYSTOLIC BLOOD PRESSURE: 183 MMHG | OXYGEN SATURATION: 98 % | TEMPERATURE: 98.1 F | RESPIRATION RATE: 16 BRPM | DIASTOLIC BLOOD PRESSURE: 96 MMHG | HEART RATE: 82 BPM | WEIGHT: 135 LBS

## 2022-01-22 DIAGNOSIS — S80.01XA CONTUSION OF RIGHT KNEE, INITIAL ENCOUNTER: ICD-10-CM

## 2022-01-22 DIAGNOSIS — M54.9 ACUTE MIDLINE BACK PAIN, UNSPECIFIED BACK LOCATION: ICD-10-CM
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
## 2022-01-22 PROCEDURE — G1004 CDSM NDSC: HCPCS

## 2022-01-22 PROCEDURE — 250N000013 HC RX MED GY IP 250 OP 250 PS 637: Performed by: EMERGENCY MEDICINE

## 2022-01-22 PROCEDURE — 73562 X-RAY EXAM OF KNEE 3: CPT | Mod: RT

## 2022-01-22 PROCEDURE — 72131 CT LUMBAR SPINE W/O DYE: CPT | Mod: MG

## 2022-01-22 PROCEDURE — 99285 EMERGENCY DEPT VISIT HI MDM: CPT | Mod: 25

## 2022-01-22 RX ORDER — ACETAMINOPHEN 325 MG/1
650 TABLET ORAL ONCE
Status: COMPLETED | OUTPATIENT
Start: 2022-01-22 | End: 2022-01-22

## 2022-01-22 RX ORDER — OXYCODONE HYDROCHLORIDE 5 MG/1
5 TABLET ORAL EVERY 6 HOURS PRN
Qty: 6 TABLET | Refills: 0 | Status: SHIPPED | OUTPATIENT
Start: 2022-01-22 | End: 2022-01-25

## 2022-01-22 RX ORDER — OXYCODONE HYDROCHLORIDE 5 MG/1
5 TABLET ORAL ONCE
Status: COMPLETED | OUTPATIENT
Start: 2022-01-22 | End: 2022-01-22

## 2022-01-22 RX ADMIN — OXYCODONE HYDROCHLORIDE 5 MG: 5 TABLET ORAL at 20:21

## 2022-01-22 RX ADMIN — ACETAMINOPHEN 650 MG: 325 TABLET, FILM COATED ORAL at 20:21
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
## 2022-01-23 NOTE — DISCHARGE INSTRUCTIONS
*You may resume diet and activities.  *Take medications as prescribed.  Tylenol for pain, oxycodone for pain not relieved by tylenol. Continue your current medications.  *Follow-up with your doctor in the next 2-3 days for reevaluation and ongoing medication prescriptions.  *Return if you develop numbness, weakness, bowel or bladder incontinence, faint or feel like you will faint or become worse in any way.    Discharge Instructions  Back Pain  You were seen today for back pain. Back pain can have many causes, but most will get better without surgery or other specific treatment. Sometimes there is a herniated ( slipped ) disc. We don t usually do MRI scans to look for these right away, since most herniated discs will get better on their own with time.  Today, we did not find any evidence that your back pain was caused by a serious condition, such as an infection, fracture, or tumor. However, sometimes symptoms develop over time and cannot be found during an emergency visit, so it is very important that you follow up with your primary doctor.  Return to the Emergency Department if:  You develop a fever with your back pain.   You have weakness or change in sensation in one or both legs.  You lose control of your bowels or bladder, or can t empty your bladder.  Your pain gets much worse.     Follow-up with your doctor:  Unless your pain has completely gone away, please make an appointment with your doctor within one week.  You may need further management of your back pain, such as more pain medication, imaging such as an X-ray or MRI, or physical therapy.    What can I do to help myself?  Remain active -- People are often afraid that they will hurt their back further or delay recovery by remaining active, but this is one of the best things you can do for your back. In fact, prolonged bed rest is not recommended. Studies have shown that people with low back pain recover faster when they remain active. Movement helps to  bring blood flow to the muscles and relieve muscle spasms as well as preventing loss of muscle strength.  Heat -- Using a heating pad can help with low back pain during the first few weeks. Do not sleep with a heating pad, as you can be burned.   Pain medications -- Take a pain medication such as, acetaminophen (Tylenol ), ibuprofen (Advil , Nuprin  ) or naproxen (Aleve ).  If you have been given a narcotic (such as codeine, hydrocodone, or oxycodone) or a muscle relaxant (such as Flexeril   or Soma  ), do not drive for four hours after you have taken it. If the narcotic contains acetaminophen (Tylenol), do not take Tylenol with it. All narcotics will cause constipation, so eat a high fiber diet.          Remember that you can always come back to the Emergency Department if you are not able to see your regular doctor in the amount of time listed above, if you get any new symptoms, or if there is anything that worries you.    Opioid Medication Information    You have been given a prescription for an opioid (narcotic) pain medicine and/or have received a pain medicine while here in the Emergency Department. These medicines can make you drowsy or impaired. You must not drive, operate dangerous equipment, or engage in any other dangerous activities while taking these medications. If you drive while taking these medications, you could be arrested for DUI, or driving under the influence. Do not drink any alcohol while you are taking these medications.   Opioid pain medications can cause addiction. If you have a history of chemical dependency of any type, you are at a higher risk of becoming addicted to pain medications.  Only take these prescribed medications to treat your pain when all other options have been tried. Take it for as short a time and as few doses as possible. Store your pain pills in a secure place, as they are frequently stolen and provide a dangerous opportunity for children or visitors in your house to  start abusing these powerful medications. We will not replace any lost or stolen medicine.  As soon as your pain is better, you should flush all your remaining medication.   Many prescription pain medications contain Tylenol  (acetaminophen), including Vicodin , Tylenol #3 , Norco , Lortab , and Percocet .  You should not take any extra pills of Tylenol  if you are using these prescription medications or you can get very sick.  Do not ever take more than 4000 mg of acetaminophen in any 24 hour period.  All opioids tend to cause constipation. Drink plenty of water and eat foods that have a lot of fiber, such as fruits, vegetables, prune juice, apple juice and high fiber cereal.  Take a laxative if you don t move your bowels at least every other day. Miralax , Milk of Magnesia, Colace , or Senna  can be used to keep you regular.

## 2022-01-23 NOTE — ED NOTES
Patient being discharged home at this time per MD orders. AVS and prescription given and patient verbalized understanding. Taxi cab arranged and staff visualized patient getting into taxi cab to go home.

## 2022-02-04 DIAGNOSIS — E78.5 HYPERLIPIDEMIA, UNSPECIFIED HYPERLIPIDEMIA TYPE: ICD-10-CM

## 2022-02-04 DIAGNOSIS — M54.12 CERVICAL RADICULOPATHY: ICD-10-CM

## 2022-02-04 NOTE — TELEPHONE ENCOUNTER
"Routing refill request to provider for review/approval because:    Requested Prescriptions   Pending Prescriptions Disp Refills    gabapentin (NEURONTIN) 300 MG capsule [Pharmacy Med Name: GABAPENTIN 300MG CAPSULES] 90 capsule 0     Sig: TAKE 3 CAPSULES BY MOUTH EVERY NIGHT AT BEDTIME        There is no refill protocol information for this order        atorvastatin (LIPITOR) 40 MG tablet [Pharmacy Med Name: ATORVASTATIN 40MG TABLETS] 30 tablet 0     Sig: TAKE 1 TABLET BY MOUTH EVERY DAY        Statins Protocol Failed - 2/4/2022  3:32 AM        Failed - LDL on file in past 12 months     Recent Labs   Lab Test 09/21/20  1416   *               Passed - No abnormal creatine kinase in past 12 months     No lab results found.             Passed - Recent (12 mo) or future (30 days) visit within the authorizing provider's specialty     Patient has had an office visit with the authorizing provider or a provider within the authorizing providers department within the previous 12 mos or has a future within next 30 days. See \"Patient Info\" tab in inbasket, or \"Choose Columns\" in Meds & Orders section of the refill encounter.              Passed - Medication is active on med list        Passed - Patient is age 18 or older              Claudia Vega RN, [facility]          "

## 2022-02-05 RX ORDER — GABAPENTIN 300 MG/1
900 CAPSULE ORAL AT BEDTIME
Qty: 90 CAPSULE | Refills: 0 | Status: SHIPPED | OUTPATIENT
Start: 2022-02-05 | End: 2022-03-08

## 2022-02-05 RX ORDER — ATORVASTATIN CALCIUM 40 MG/1
40 TABLET, FILM COATED ORAL DAILY
Qty: 30 TABLET | Refills: 0 | Status: SHIPPED | OUTPATIENT
Start: 2022-02-05 | End: 2022-03-04

## 2022-02-05 NOTE — TELEPHONE ENCOUNTER
Refill sent.  Follow up needed for additional refills - message on script.   Has appointment scheduled for this month.      MARIE

## 2022-02-05 NOTE — TELEPHONE ENCOUNTER
Refill sent.  Follow up needed for additional refills - message on script.     Has follow up appointment scheduled.      TALIBK

## 2022-02-21 ENCOUNTER — OFFICE VISIT (OUTPATIENT)
Dept: FAMILY MEDICINE | Facility: CLINIC | Age: 67
End: 2022-02-21
Payer: COMMERCIAL

## 2022-02-21 VITALS
OXYGEN SATURATION: 98 % | RESPIRATION RATE: 18 BRPM | BODY MASS INDEX: 24.41 KG/M2 | WEIGHT: 142.2 LBS | DIASTOLIC BLOOD PRESSURE: 68 MMHG | TEMPERATURE: 98.3 F | SYSTOLIC BLOOD PRESSURE: 162 MMHG | HEART RATE: 98 BPM

## 2022-02-21 DIAGNOSIS — Z12.11 COLON CANCER SCREENING: ICD-10-CM

## 2022-02-21 DIAGNOSIS — E11.9 TYPE 2 DIABETES MELLITUS WITHOUT COMPLICATION, WITH LONG-TERM CURRENT USE OF INSULIN (H): ICD-10-CM

## 2022-02-21 DIAGNOSIS — M54.50 ACUTE BILATERAL LOW BACK PAIN WITHOUT SCIATICA: Primary | ICD-10-CM

## 2022-02-21 DIAGNOSIS — Z79.4 TYPE 2 DIABETES MELLITUS WITHOUT COMPLICATION, WITH LONG-TERM CURRENT USE OF INSULIN (H): ICD-10-CM

## 2022-02-21 DIAGNOSIS — M54.2 NECK PAIN: ICD-10-CM

## 2022-02-21 LAB
ALBUMIN SERPL-MCNC: 3.6 G/DL (ref 3.4–5)
ALP SERPL-CCNC: 84 U/L (ref 40–150)
ALT SERPL W P-5'-P-CCNC: 34 U/L (ref 0–70)
ANION GAP SERPL CALCULATED.3IONS-SCNC: 2 MMOL/L (ref 3–14)
AST SERPL W P-5'-P-CCNC: 21 U/L (ref 0–45)
BILIRUB SERPL-MCNC: 0.2 MG/DL (ref 0.2–1.3)
BUN SERPL-MCNC: 31 MG/DL (ref 7–30)
CALCIUM SERPL-MCNC: 9.8 MG/DL (ref 8.5–10.1)
CHLORIDE BLD-SCNC: 105 MMOL/L (ref 94–109)
CHOLEST SERPL-MCNC: 176 MG/DL
CO2 SERPL-SCNC: 31 MMOL/L (ref 20–32)
CREAT SERPL-MCNC: 1.07 MG/DL (ref 0.66–1.25)
CREAT UR-MCNC: 89 MG/DL
FASTING STATUS PATIENT QL REPORTED: YES
GFR SERPL CREATININE-BSD FRML MDRD: 76 ML/MIN/1.73M2
GLUCOSE BLD-MCNC: 122 MG/DL (ref 70–99)
HBA1C MFR BLD: 6.9 % (ref 0–5.6)
HDLC SERPL-MCNC: 59 MG/DL
LDLC SERPL CALC-MCNC: 93 MG/DL
MICROALBUMIN UR-MCNC: 22 MG/L
MICROALBUMIN/CREAT UR: 24.72 MG/G CR (ref 0–17)
NONHDLC SERPL-MCNC: 117 MG/DL
POTASSIUM BLD-SCNC: 4.6 MMOL/L (ref 3.4–5.3)
PROT SERPL-MCNC: 7.2 G/DL (ref 6.8–8.8)
SODIUM SERPL-SCNC: 138 MMOL/L (ref 133–144)
TRIGL SERPL-MCNC: 121 MG/DL

## 2022-02-21 PROCEDURE — 80053 COMPREHEN METABOLIC PANEL: CPT | Performed by: FAMILY MEDICINE

## 2022-02-21 PROCEDURE — 82043 UR ALBUMIN QUANTITATIVE: CPT | Performed by: FAMILY MEDICINE

## 2022-02-21 PROCEDURE — 99214 OFFICE O/P EST MOD 30 MIN: CPT | Performed by: FAMILY MEDICINE

## 2022-02-21 PROCEDURE — 83036 HEMOGLOBIN GLYCOSYLATED A1C: CPT | Performed by: FAMILY MEDICINE

## 2022-02-21 PROCEDURE — 36415 COLL VENOUS BLD VENIPUNCTURE: CPT | Performed by: FAMILY MEDICINE

## 2022-02-21 PROCEDURE — 80061 LIPID PANEL: CPT | Performed by: FAMILY MEDICINE

## 2022-02-21 RX ORDER — OXYCODONE HYDROCHLORIDE 5 MG/1
5 TABLET ORAL EVERY 6 HOURS PRN
Qty: 12 TABLET | Refills: 0 | Status: SHIPPED | OUTPATIENT
Start: 2022-02-21 | End: 2022-02-24

## 2022-02-21 ASSESSMENT — PAIN SCALES - GENERAL: PAINLEVEL: WORST PAIN (10)
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
## 2022-03-02 NOTE — TELEPHONE ENCOUNTER
Reason for Call:  Other prescription    Detailed comments: Please call patient back has brain injury cant remember his medications. Just saw doctor went to pharmacy but nothing  Was faxed to them. [place] [employer] Message - homeless but has cell phone so please call back asap.    Phone Number Patient can be reached at: Home number on file [phone] (home)    Best Time: any     Can we leave a detailed message on this number? YES    Call taken on 8/2/2018 at 12:17 PM by Tamie Tracey     No

## 2022-03-07 DIAGNOSIS — M54.12 CERVICAL RADICULOPATHY: ICD-10-CM

## 2022-03-08 RX ORDER — GABAPENTIN 300 MG/1
CAPSULE ORAL
Qty: 90 CAPSULE | Refills: 1 | Status: SHIPPED | OUTPATIENT
Start: 2022-03-08 | End: 2022-04-20

## 2022-03-10 ENCOUNTER — TELEPHONE (OUTPATIENT)
Dept: FAMILY MEDICINE | Facility: CLINIC | Age: 67
End: 2022-03-10
Payer: MEDICARE

## 2022-03-10 RX ORDER — OXYCODONE HYDROCHLORIDE 5 MG/1
5 TABLET ORAL EVERY 6 HOURS PRN
Qty: 12 TABLET | Refills: 0 | Status: CANCELLED | OUTPATIENT
Start: 2022-03-10 | End: 2022-03-13

## 2022-03-10 NOTE — TELEPHONE ENCOUNTER
Called pt and he said that he has been waiting on an Rx for oxycodone for his ongoing pain that he saw Dr. Rhodes for on 2/21/22.

## 2022-03-10 NOTE — TELEPHONE ENCOUNTER
Please call patient re:  That was a short term script.  Not for long term use.  No refill available.  He should follow up if ongoing pain.    Has appointment for 4/11 but sooner follow up if ongoing pain noted.      MARIE

## 2022-03-10 NOTE — TELEPHONE ENCOUNTER
Patient transferred to RN team.     Advised Pt that he was given short term script for Oxy. Pt verbalized understanding and stated he is still in quite a lot of pain. Pt reporting a significant amount of back pain and neck pain.    Pt stating he is not taking Ibuprofen because it makes his stomach upset. He takes Tylenol but has minimal relief from this.     Per Dr. Rhodes's last note, Pt should follow up around 3/7/22 for this - Pt scheduled in slot with PCP on 3/14/22 at 11:40am.     Pt also reports he needs to follow up on diabetes with PCP.     Will route to provider as FYI - Pt requesting call back if any further advisement on pain management until his appointment.    Melissa Gaxiola RN, BSN  [facility]

## 2022-03-10 NOTE — TELEPHONE ENCOUNTER
Patient called back. I gave him Dr Correa's message and I scheduled an appt for 3/31/2022 with Dr Correa at [facility]. Patient has questions regarding how to manage pain until his appointment. I transferred him to the [facility] RN.  Nisreen Crespo [facility]  2nd Floor  Primary Care

## 2022-03-11 DIAGNOSIS — E11.9 TYPE 2 DIABETES MELLITUS WITHOUT COMPLICATION, WITHOUT LONG-TERM CURRENT USE OF INSULIN (H): ICD-10-CM

## 2022-03-14 ENCOUNTER — TELEPHONE (OUTPATIENT)
Dept: FAMILY MEDICINE | Facility: CLINIC | Age: 67
End: 2022-03-14
Payer: MEDICARE

## 2022-03-14 NOTE — TELEPHONE ENCOUNTER
Patient has a appointment for follow-up on pain scheduled by our nurse at 1120 on 3/14. This would lead into blocked time on your schedule. Please advise if you would like this rescheduled.

## 2022-03-16 RX ORDER — INSULIN ASPART 100 [IU]/ML
INJECTION, SOLUTION INTRAVENOUS; SUBCUTANEOUS
Qty: 3 ML | Refills: 1 | Status: SHIPPED | OUTPATIENT
Start: 2022-03-16

## 2022-03-16 NOTE — TELEPHONE ENCOUNTER
Good control with recent A1C.    Hemoglobin A1C POCT   Date Value Ref Range Status   05/13/2021 7.3 (H) 0 - 5.6 % Final     Comment:     Normal <5.7% Prediabetes 5.7-6.4%  Diabetes 6.5% or higher - adopted from ADA   consensus guidelines.       Hemoglobin A1C   Date Value Ref Range Status   02/21/2022 6.9 (H) 0.0 - 5.6 % Final     Comment:     Normal <5.7%   Prediabetes 5.7-6.4%    Diabetes 6.5% or higher     Note: Adopted from ADA consensus guidelines.

## 2022-04-17 DIAGNOSIS — M54.12 CERVICAL RADICULOPATHY: ICD-10-CM

## 2022-04-19 ENCOUNTER — OFFICE VISIT (OUTPATIENT)
Dept: AUDIOLOGY | Facility: CLINIC | Age: 67
End: 2022-04-19
Payer: COMMERCIAL

## 2022-04-19 DIAGNOSIS — H90.6 MIXED HEARING LOSS, BILATERAL: Primary | ICD-10-CM

## 2022-04-19 PROCEDURE — 99207 PR NO CHARGE LOS: CPT | Performed by: AUDIOLOGIST

## 2022-04-19 PROCEDURE — 92592 PR HEARING AID CHECK, MONAURAL: CPT | Performed by: AUDIOLOGIST

## 2022-04-19 PROCEDURE — V5266 BATTERY FOR HEARING DEVICE: HCPCS | Mod: NU | Performed by: AUDIOLOGIST

## 2022-04-19 NOTE — TELEPHONE ENCOUNTER
Routing refill request to provider for review/approval because:  Drug not on the [facility] refill protocol   Nancy Raphael RN

## 2022-04-19 NOTE — PROGRESS NOTES
AUDIOLOGY REPORT: HEARING AID RECHECK    SUBJECTIVE: Faheem Jeff is a 66 year old male, :  1955, was seen in the Audiology Clinic at [facility] on 22 for a return check of their hearing aids. Patient was unaccompanied to today's visit.       Background:   Patient is here today with the complaint of needing batteries, waxguards, and the left ear hearing aid turned up.     Procedures:                               SIDE: Both                          : Phonak                          TYPE: Virto B70-13 & CROS ITE                          S/N:                                       R: [id number]                                      L: [id number]                          WARRANTY: 2023     Today we cleaned the hearing aid and CROS. Biologic listening check found the hearing aid sounding crisp and clear and the CROS was transmitting well. Per patient request we increased gain for all input levels at all frequencies 3 steps. Patient requested his 90 day supply of batteries and 30 size 13 were provided to him.     Plan:   Patient will return as needed for hearing aid concerns.     CHARGES:   [id number] x36 Batteries & [id number] Monaural hearing aid check       Bib Mccall [facility]  Licensed Audiologist #[id number]  2022

## 2022-04-20 RX ORDER — GABAPENTIN 300 MG/1
CAPSULE ORAL
Qty: 90 CAPSULE | Refills: 1 | Status: SHIPPED | OUTPATIENT
Start: 2022-04-20 | End: 2022-07-15

## 2022-04-30 ENCOUNTER — HEALTH MAINTENANCE LETTER (OUTPATIENT)
Age: 67
End: 2022-04-30

## 2022-04-30 DIAGNOSIS — E11.9 TYPE 2 DIABETES MELLITUS WITHOUT COMPLICATION, WITHOUT LONG-TERM CURRENT USE OF INSULIN (H): ICD-10-CM

## 2022-05-02 NOTE — TELEPHONE ENCOUNTER
Called patient and left message stating refill has been sent into [facility] as well as the provider requesting a med check in August and to call the clinic at specified phone number to schedule

## 2022-05-02 NOTE — TELEPHONE ENCOUNTER
Patient calling to request a refill of his Rx.     He is out of his metformin. He was given 95 tablets last month, but he takes 3 in the morning and 2 tablets at night.     Pt would like call back once sent in.     Melissa Gaxiola RN, BSN  [facility]

## 2022-05-02 NOTE — TELEPHONE ENCOUNTER
Please call patient at his request - refill sent.    Follow up appointment due in August.    MARIE

## 2022-06-20 ENCOUNTER — HOSPITAL ENCOUNTER (EMERGENCY)
Facility: CLINIC | Age: 67
Discharge: HOME | End: 2022-06-20
Attending: EMERGENCY MEDICINE | Admitting: EMERGENCY MEDICINE
Payer: MEDICARE

## 2022-06-20 VITALS
BODY MASS INDEX: 22.49 KG/M2 | RESPIRATION RATE: 16 BRPM | HEART RATE: 80 BPM | OXYGEN SATURATION: 98 % | TEMPERATURE: 98.6 F | HEIGHT: 65 IN | SYSTOLIC BLOOD PRESSURE: 125 MMHG | WEIGHT: 135 LBS | DIASTOLIC BLOOD PRESSURE: 75 MMHG

## 2022-06-20 DIAGNOSIS — H60.392 OTHER INFECTIVE ACUTE OTITIS EXTERNA OF LEFT EAR: ICD-10-CM

## 2022-06-20 PROCEDURE — 250N000013 HC RX MED GY IP 250 OP 250 PS 637: Performed by: EMERGENCY MEDICINE

## 2022-06-20 PROCEDURE — 99283 EMERGENCY DEPT VISIT LOW MDM: CPT

## 2022-06-20 RX ORDER — CIPROFLOXACIN AND DEXAMETHASONE 3; 1 MG/ML; MG/ML
4 SUSPENSION/ DROPS AURICULAR (OTIC) 2 TIMES DAILY
Qty: 2.8 ML | Refills: 0 | Status: SHIPPED | OUTPATIENT
Start: 2022-06-20 | End: 2022-06-21

## 2022-06-20 RX ORDER — IBUPROFEN 600 MG/1
600 TABLET, FILM COATED ORAL EVERY 6 HOURS PRN
Qty: 30 TABLET | Refills: 0 | Status: SHIPPED | OUTPATIENT
Start: 2022-06-20 | End: 2022-07-20

## 2022-06-20 RX ORDER — ACETAMINOPHEN 500 MG
1000 TABLET ORAL ONCE
Status: COMPLETED | OUTPATIENT
Start: 2022-06-20 | End: 2022-06-20

## 2022-06-20 RX ADMIN — ACETAMINOPHEN 1000 MG: 500 TABLET, FILM COATED ORAL at 02:13

## 2022-06-20 NOTE — ED PROVIDER NOTES
"  History   Chief Complaint:  Otalgia       The history is provided by the patient.      Faheem Jeff is a 67 year old male who wears hearing aids and has a history of type II diabetes and hearing loss who presents with otalgia. Patient reports he poked a q-tip into his left ear and it came out bloody. Has experienced pain ever since. Denies progressing hearing loss, fever or ear drainage. No tinnitus.  Not on blood thinners. Has seen an ear specialist at [facility] and has an audiologist (Dr. Kent).  He denies any other symptoms. No fevers. No facial pain or swelling.    Would like prescription sent to [facility] in [place].     Review of Systems   HENT: Positive for ear pain. Negative for ear discharge and hearing loss.    All other systems reviewed and are negative.    Allergies:  Dust Mite Extract  Meclizine  Poplar Bud Extract  Trazodone Derivatives    Medications:  amLODIPine (NORVASC) 10 MG tablet  aspirin 81 MG chewable tablet  atorvastatin (LIPITOR) 40 MG tablet  calcium carbonate (OS-LORIN) 1500 (600 Ca) MG tablet  gabapentin (NEURONTIN) 300 MG capsule  insulin aspart (NOVOLOG FLEXPEN) 100 UNIT/ML pen  magnesium oxide 400 MG CAPS  metFORMIN (GLUCOPHAGE) 500 MG tablet  methylphenidate (RITALIN) 10 MG tablet  metoprolol tartrate (LOPRESSOR) 25 MG tablet  zolpidem (AMBIEN) 5 MG tablet    Past Medical History:     Tobacco dependence   Obstructive sleep apnea  GERD  Hypertension  Type II diabetes   Depression  Anxiety  Hyperlipidemia    Psychosis   Cocaine dependence   Cervical radiculopathy      Past Surgical History:    HB cystoscopy      Family History:    Father - Liver cirrhosis (alcohol related), Diabetes   Mother - heart disease     Social History:  The patient presents to the ED alone.   Living Situation: homeless      Physical Exam     Patient Vitals for the past 24 hrs:   BP Temp Pulse Resp SpO2 Height Weight   06/20/22 0207 128/66 98.6  F (37  C) 80 16 98 % 1.651 m (5' 5\") 61.2 kg (135 lb) "       Physical Exam  General: Well appearing, nontoxic. Resting comfortably  Head:  Scalp, face, and head appear normal  Eyes:  Pupils equal, round, and reactive to light    Conjunctivae noninjected and sclera white  ENT:    The nose is normal    Right pinnae, auditory canal and TM is normal. Mild pain on movement of the left pinna. No mastoid tenderness to palpation bilaterally. Left auditory canal is edematous and tortuous making good visualization of the left TM difficult. No clear wound or bleeding is seen. Moderate cerumen in the canal. Pain limits examination. No pre-auricular or facial swelling or tenderness to palpation. No erythema of the pinna. Hearing grossly intact. No left ear drainage.  Neck:  Normal range of motion  MSK:  Normal tone.  CV:  RRR, no M/R/G  Resp:  CTAB, no increased WOB  Skin:  No rash or lesions noted.  Neuro:  Speech is normal and fluent    Moves all extremities spontaneously  Psych: Awake, Alert. Normal affect      Appropriate interactions           Emergency Department Course     Emergency Department Course:         Reviewed:  I reviewed nursing notes, vitals, past medical history and Care Everywhere    Assessments:  0617 I obtained history and examined the patient as noted above.     Interventions:  0213 Tylenol 1000 mg, PO    Disposition:  The patient was discharged to home.     Impression & Plan     Medical Decision Making:  Faheem Jeff is a 67 year old male who presents with an exam consistent with otitis externa.  There is no sign of mastoiditis, mass, odontogenic process. Infection likely started after mild trauma due to q-tip. No significant wound seen on exam although exam is limited due to ear canal swelling and pain. Clear view of the TM difficult. The patient will be started on ciprodex and may take ibuprofen for pain.  Return if increasing pain, fever, hearing decrease or discharge.  Follow-up with ENT for reevaluation in 2-3 days if not improving. Return precautions  were discussed with patient. The patient's questions were answered and the patient was agreeable with discharge.     Diagnosis:    ICD-10-CM    1. Other infective acute otitis externa of left ear  H60.392        Discharge Medications:  New Prescriptions    CIPROFLOXACIN-DEXAMETHASONE (CIPRODEX) 0.3-0.1 % OTIC SUSPENSION    Place 4 drops Into the left ear 2 times daily for 7 days    IBUPROFEN (ADVIL/MOTRIN) 600 MG TABLET    Take 1 tablet (600 mg) by mouth every 6 hours as needed for other (pain, aches, fever) Take with food.       Scribe Disclosure:  LOW PETERSON, am serving as a scribe at 5:56 AM on 6/20/2022 to document services personally performed by Gregorio Daily MD based on my observations and the provider's statements to me.          Gregorio Daily MD  06/21/22 0731

## 2022-06-20 NOTE — ED TRIAGE NOTES
Pt BIBA from home. Patient c/o left ear pain after sticking a Q-tip in it a few days ago. Patient stated there was blood on the Q-tip.

## 2022-06-21 ENCOUNTER — PATIENT OUTREACH (OUTPATIENT)
Dept: CARE COORDINATION | Facility: CLINIC | Age: 67
End: 2022-06-21
Payer: MEDICARE

## 2022-06-21 ENCOUNTER — TELEPHONE (OUTPATIENT)
Dept: FAMILY MEDICINE | Facility: CLINIC | Age: 67
End: 2022-06-21
Payer: MEDICARE

## 2022-06-21 DIAGNOSIS — E11.9 TYPE 2 DIABETES MELLITUS WITHOUT COMPLICATION, WITH LONG-TERM CURRENT USE OF INSULIN (H): ICD-10-CM

## 2022-06-21 DIAGNOSIS — Z79.4 TYPE 2 DIABETES MELLITUS WITHOUT COMPLICATION, WITH LONG-TERM CURRENT USE OF INSULIN (H): ICD-10-CM

## 2022-06-21 DIAGNOSIS — Z71.89 OTHER SPECIFIED COUNSELING: ICD-10-CM

## 2022-06-21 NOTE — ED PROVIDER NOTES
Rx changed from Ciprodex to Cipro HC due to insurance issues. Sent electronically to [facility] per patient preference.     Gregorio Daily MD  06/21/22 3898

## 2022-06-21 NOTE — TELEPHONE ENCOUNTER
Debby- care coordinator calling regarding medication for patient's ear infection prescribed in ED on 6/20/22. Patient unable to get medication- Debby unsure of issue.     Writer called below pharmacy:  [id number] - [place], [place] - [address] AT [facility] [address]    Pharmacy representative states that a PA is needed for this specific RX. States that ofloxacin or generic version would be covered - please advise     Routing to prescribing provider and PCP     Pt callback [phone]    Jude Adams RN  [facility]

## 2022-06-21 NOTE — PROGRESS NOTES
Clinic Care Coordination Contact    Background: Care Coordination referral placed from [facility] discharge report for reason of patient meeting criteria for a TCM outreach call by [facility] team.    Assessment: Upon chart review, CCR[facility] Team member will cancel/close the referral for TCM outreach due to reason below:    Patient isn't interested in speaking with care team today and disconnected call    Plan: Care Coordination referral for TCM outreach canceled.    Mell Stiles MA  [facility], [facility]

## 2022-06-27 ENCOUNTER — VIRTUAL VISIT (OUTPATIENT)
Dept: FAMILY MEDICINE | Facility: CLINIC | Age: 67
End: 2022-06-27

## 2022-06-27 DIAGNOSIS — H60.392 INFECTIVE OTITIS EXTERNA, LEFT: Primary | ICD-10-CM

## 2022-06-27 DIAGNOSIS — E11.9 TYPE 2 DIABETES MELLITUS WITHOUT COMPLICATION, WITH LONG-TERM CURRENT USE OF INSULIN (H): ICD-10-CM

## 2022-06-27 DIAGNOSIS — Z79.4 TYPE 2 DIABETES MELLITUS WITHOUT COMPLICATION, WITH LONG-TERM CURRENT USE OF INSULIN (H): ICD-10-CM

## 2022-06-27 PROCEDURE — 99442 PR PHYSICIAN TELEPHONE EVALUATION 11-20 MIN: CPT | Mod: 95 | Performed by: NURSE PRACTITIONER

## 2022-06-27 RX ORDER — CEPHALEXIN 500 MG/1
500 CAPSULE ORAL 2 TIMES DAILY
Qty: 14 CAPSULE | Refills: 0 | Status: SHIPPED | OUTPATIENT
Start: 2022-06-27 | End: 2022-07-04

## 2022-06-27 RX ORDER — NITROGLYCERIN 0.4 MG/1
TABLET SUBLINGUAL
COMMUNITY
Start: 2021-12-20 | End: 2024-07-01

## 2022-06-27 RX ORDER — KETOCONAZOLE 20 MG/ML
SHAMPOO TOPICAL
COMMUNITY
Start: 2020-07-23

## 2022-06-27 RX ORDER — NEOMYCIN SULFATE, POLYMYXIN B SULFATE AND HYDROCORTISONE 10; 3.5; 1 MG/ML; MG/ML; [USP'U]/ML
3 SUSPENSION/ DROPS AURICULAR (OTIC) 3 TIMES DAILY
Qty: 4 ML | Refills: 0 | Status: SHIPPED | OUTPATIENT
Start: 2022-06-27 | End: 2022-07-04

## 2022-06-27 NOTE — PATIENT INSTRUCTIONS
PLAN:   1.   Symptomatic therapy suggested: will start on oral antibiotic and add on drops to decrease the inflamation.  2.  Orders Placed This Encounter   Medications    nitroGLYcerin (NITROSTAT) 0.4 MG sublingual tablet     Sig: DISSOLVE 0.4MG UNDER THE TONGUE EVERY 5 MINUTES AS NEEDED FOR CHEST PAIN    metroNIDAZOLE (METROCREAM) 0.75 % external cream     Sig: APPLY TO ENTIRE FACE ONCE A DAY FOR REDNESS AND ACNE BUMPS    ketoconazole (NIZORAL) 2 % external shampoo     Sig: APPLY AS A FACE WASH AND SCALP SHAMPOO LEAVE ON 3-5 MINUTES THEN RINSE CAN USE 3 TIMES WEEKLY UP TO DAILY    cephALEXin (KEFLEX) 500 MG capsule     Sig: Take 1 capsule (500 mg) by mouth 2 times daily for 7 days     Dispense:  14 capsule     Refill:  0    neomycin-polymyxin-hydrocortisone (CORTISPORIN) 3.5-31029-9 otic suspension     Sig: Place 3 drops Into the left ear 3 times daily for 7 days     Dispense:  4 mL     Refill:  0     Orders Placed This Encounter   Procedures    Adult ENT  Referral       3. Patient needs to follow up in if no improvement,or sooner if worsening of symptoms or other symptoms develop.  CONSULTATION/REFERRAL to Sue

## 2022-06-27 NOTE — PROGRESS NOTES
Faheem is a 67 year old who is being evaluated via a billable telephone visit.      What phone number would you like to be contacted at? [phone]    How would you like to obtain your AVS? Luz Elena        Jeovanny Mayen is a 67 year old, presenting for the following health issues:  ER F/U (Ear pain )      HPI     ED/UC Followup:    Facility:  [facility]  Date of visit: 6/20/22  Reason for visit: Ear Pain  Current Status: unable to get drops for ear as insurance wouldn't cover.       Medical Decision Making:  Faheem Jeff is a 67 year old male who presents with an exam consistent with otitis externa.  There is no sign of mastoiditis, mass, odontogenic process. Infection likely started after mild trauma due to q-tip. No significant wound seen on exam although exam is limited due to ear canal swelling and pain. Clear view of the TM difficult. The patient will be started on ciprodex and may take ibuprofen for pain.  Return if increasing pain, fever, hearing decrease or discharge.  Follow-up with ENT for reevaluation in 2-3 days if not improving. Return precautions were discussed with patient. The patient's questions were answered and the patient was agreeable with discharge.       Labs reviewed in Epic    Was seen in the ER and they sent ear drops but his insurance will not cover them. 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    Social History     Tobacco Use     Smoking status: Current Every Day Smoker     Smokeless tobacco: Never Used   Substance Use Topics     Alcohol use: No     No family history on file.      Current Outpatient Medications   Medication Sig Dispense Refill     alcohol swab prep pads Use to swab area of injection/demarco as directed. 100 each 3     amLODIPine (NORVASC) 10 MG tablet Take 1 tablet (10 mg) by mouth daily 90 tablet 1     aspirin 81 MG chewable tablet CHEW AND SWALLOW 1 TABLET(81 MG) BY MOUTH DAILY 36 tablet 0     atorvastatin (LIPITOR) 40 MG tablet TAKE 1 TABLET BY MOUTH DAILY 30 tablet 2     BD PEN NEEDLE GUNNAR 2ND GEN 32G X 4 MM miscellaneous USE 3 EACH PER  each 3     blood glucose (NO BRAND SPECIFIED) lancets standard Use to test blood sugar 4 times daily or as directed. 500 each 5     blood glucose (NO BRAND SPECIFIED) test strip Use to test blood sugar 4 times daily or as directed. 400 strip 3     calcium carbonate (OS-LORIN) 1500 (600 Ca) MG tablet TAKE 1 TABLET BY MOUTH TWICE DAILY WITH MEALS 60 tablet 3     ciprofloxacin-hydrocortisone (CIPRO HC OTIC) 0.2-1 % otic suspension Place 3 drops Into the left ear 2 times daily for 7 days 3 mL 0     gabapentin (NEURONTIN) 300 MG capsule TAKE 3 CAPSULES BY MOUTH AT BEDTIME 90 capsule 1     ibuprofen (ADVIL/MOTRIN) 600 MG tablet Take 1 tablet (600 mg) by mouth every 6 hours as needed for other (pain, aches, fever) Take with food. 30 tablet 0     insulin aspart (NOVOLOG FLEXPEN) 100 UNIT/ML pen USE THREE TIMES DAILY PER SLIDING SCALE 150-200 1 UNIT, 201-250 2 UNITS, 251-300 3 UNITS, 301-350 4 UNITS,>350 5 UNITS. MAX 10 UNITS PER DAY 3 mL 1  "    magnesium oxide 400 MG CAPS Take 1 capsule by mouth daily 90 capsule 3     metFORMIN (GLUCOPHAGE) 500 MG tablet TAKE 3 TABLETS BY MOUTH EVERY MORNING AND 2 TABLETS EVERY EVENING 150 tablet 3     methylphenidate (RITALIN) 10 MG tablet Take 10 mg by mouth       metoprolol tartrate (LOPRESSOR) 25 MG tablet Take 0.5 tablets (12.5 mg) by mouth 2 times daily       multivitamin w/minerals (THERA-VIT-M) tablet Take 1 tablet by mouth daily 90 tablet 3     order for DME Equipment being ordered: Scooter - repair.    Patient continues to need and use his scooter daily.  The scooter will continue to need repairs and is medically necessary for the next 12 months 1 each 0     order for DME Equipment being ordered: Hospital Bed 1 each 0     order for DME Equipment being ordered: Glucometer per insurance preference, lancets, test strips.  Patient to check sugars 4 times daily, 90 day supply for test strips and lancets, refill 3 times 1 Device 0     zolpidem (AMBIEN) 5 MG tablet TAKE 1 TABLET BY MOUTH AT BEDTIME AS NEEDED FOR SLEEP       Allergies   Allergen Reactions     Dust Mite Extract Unknown     Meclizine Other (See Comments)     Described \"feeling funny and burning.\"     Poplar Bud Extract Unknown     Trazodone Derivatives Unknown     \"Puts me in a coma\"     Recent Labs   Lab Test 02/21/22  1513 05/13/21  1514 09/21/20  1416 12/05/19  1141 02/28/19  1545 08/02/18  0954 09/26/16  0000   A1C 6.9* 7.3* 6.6* 6.8* 6.9*   < >  --    LDL 93  --  135* 203* 195*   < >  --    HDL 59  --  61 59  --    < >  --    TRIG 121  --  108 141  --    < >  --    ALT 34  --  33 20  --    < >  --    CR 1.07  --  0.88 0.86  --    < > 1.15   GFRESTIMATED 76  --  89 90  --    < > >60   GFRESTBLACK  --   --  >90 >90  --    < > >60   POTASSIUM 4.6  --  4.4 4.6  --    < > 3.5   TSH  --   --   --   --  0.59  --  1.61    < > = values in this interval not displayed.          Review of Systems   Constitutional, HEENT, cardiovascular, pulmonary, gi and gu " systems are negative, except as otherwise noted.      Objective           Vitals:  No vitals were obtained today due to virtual visit.    Physical Exam   alert, active and no distress  PSYCH: Alert and oriented times 3; coherent speech, normal   rate and volume, able to articulate logical thoughts, able   to abstract reason, no tangential thoughts, no hallucinations   or delusions  His affect is normal and pleasant  RESP: No cough, no audible wheezing, able to talk in full sentences  Remainder of exam unable to be completed due to telephone visits    Diagnostic Test Results:   Diagnostic Test Results:  Labs reviewed in Epic      Assessment & Plan     Infective otitis externa, left  Take antibiotics as directed.  If new, worsening or persistent symptoms, the patient is to call or return for a recheck.  Will Start:  - cephALEXin (KEFLEX) 500 MG capsule  Dispense: 14 capsule; Refill: 0  - neomycin-polymyxin-hydrocortisone (CORTISPORIN) 3.5-51986-4 otic suspension  Dispense: 4 mL; Refill: 0  - Adult ENT  Referral    Type 2 diabetes mellitus without complication, with long-term current use of insulin (H)  appears in goo control       Review of external notes as documented elsewhere in note  Prescription drug management   Time spent doing chart review, history and exam, documentation and further activities per the note       Patient Instructions     PLAN:   1.   Symptomatic therapy suggested: will start on oral antibiotic and add on drops to decrease the inflamation.  2.  Orders Placed This Encounter   Medications     nitroGLYcerin (NITROSTAT) 0.4 MG sublingual tablet     Sig: DISSOLVE 0.4MG UNDER THE TONGUE EVERY 5 MINUTES AS NEEDED FOR CHEST PAIN     metroNIDAZOLE (METROCREAM) 0.75 % external cream     Sig: APPLY TO ENTIRE FACE ONCE A DAY FOR REDNESS AND ACNE BUMPS     ketoconazole (NIZORAL) 2 % external shampoo     Sig: APPLY AS A FACE WASH AND SCALP SHAMPOO LEAVE ON 3-5 MINUTES THEN RINSE CAN USE 3 TIMES  "WEEKLY UP TO DAILY     cephALEXin (KEFLEX) 500 MG capsule     Sig: Take 1 capsule (500 mg) by mouth 2 times daily for 7 days     Dispense:  14 capsule     Refill:  0     neomycin-polymyxin-hydrocortisone (CORTISPORIN) 3.5-39514-7 otic suspension     Sig: Place 3 drops Into the left ear 3 times daily for 7 days     Dispense:  4 mL     Refill:  0     Orders Placed This Encounter   Procedures     Adult ENT  Referral       3. Patient needs to follow up in if no improvement,or sooner if worsening of symptoms or other symptoms develop.  CONSULTATION/REFERRAL to Sue       Estimated body mass index is 22.47 kg/m  as calculated from the following:    Height as of 6/20/22: 1.651 m (5' 5\").    Weight as of 6/20/22: 61.2 kg (135 lb).  GFR Estimate   Date Value Ref Range Status   02/21/2022 76 >60 mL/min/1.73m2 Final     Comment:     Effective December 21, 2021 eGFRcr in adults is calculated using the 2021 CKD-EPI creatinine equation which includes age and gender (Iam ge al., NEJM, DOI: 10.1056/DKURuw7040682)   09/21/2020 89 >60 mL/min/[1.73_m2] Final     Comment:     Non  GFR Calc  Starting 12/18/2018, serum creatinine based estimated GFR (eGFR) will be   calculated using the Chronic Kidney Disease Epidemiology Collaboration   (CKD-EPI) equation.           Return in about 1 week (around 7/4/2022), or if symptoms worsen or fail to improve.    DAVID Weinberg [facility]          Phone call duration: 13 minutes    .  ..    "

## 2022-06-30 ENCOUNTER — TELEPHONE (OUTPATIENT)
Dept: OTOLARYNGOLOGY | Facility: CLINIC | Age: 67
End: 2022-06-30

## 2022-06-30 DIAGNOSIS — H60.393 INFECTIVE OTITIS EXTERNA, BILATERAL: Primary | ICD-10-CM

## 2022-06-30 NOTE — TELEPHONE ENCOUNTER
[facility] Call Center    Phone Message    May a detailed message be left on voicemail: yes     Reason for Call: Appointment Intake    Referring Provider Name: Yumiko Patterson APRN CNP   Diagnosis and/or Symptoms: Infective otitis externa, left [B71.392]    Writer unable to scheduled until August, 2022 Please reach out to patient with scheduling options.  Per discharge notes, Follow-up with ENT for reevaluation in 2-3 days if not improving      Action Taken: Other: FK ENT     Travel Screening: Not Applicable

## 2022-07-01 RX ORDER — CIPROFLOXACIN AND DEXAMETHASONE 3; 1 MG/ML; MG/ML
5 SUSPENSION/ DROPS AURICULAR (OTIC) 2 TIMES DAILY
Qty: 5 ML | Refills: 0 | Status: SHIPPED | OUTPATIENT
Start: 2022-07-01 | End: 2022-07-11

## 2022-07-01 NOTE — TELEPHONE ENCOUNTER
Called patient:    Patient states his left ear is infected  20% hearing in right ear and 60% hearing in left ear   Patient states he can hardly hear at all  Patient is currently taking antibiotics for ear infection  Patient states the pain has gone away, but he cannot hear very well.   Patient is states it is hard to tell if he is having any pressure  Patient does have dust mite allergy  No popping or cracking in ears  Discharge is coming out of ears that was cleaned out and it is the consistency of ear wax, but brown and black  No fever   Patient has medicare insurance so he cant make appointment for hearing exam without referral first.   Forwarding to physician to review and advise. Dr. james ferreira saw patient 7/22/21.    Sarah LOPEZ RN, [facility] 07/01/22 8:47 AM

## 2022-07-07 NOTE — TELEPHONE ENCOUNTER
Patient states that he was to be fit in Dr Bolaños schedule due to left ear infection from an ER visit at the end of June but has not gotten a call back. Please call patient at above number. Thank you.

## 2022-07-07 NOTE — TELEPHONE ENCOUNTER
Called patient, he had a difficult time hearing conversation so writer spoke with Claudia. Patient noted improvement with use of ear drops but continues to experience fullness in ears.     Writer received a verbal from Faheem Jeff to contact Claudia S provider at [phone] to schedule an appointment to have his ears checked, possible irrigation.    Routed to Dr. Rogers for review and advise.     Dominic Martínez RN on 7/7/2022 at 4:21 PM

## 2022-07-08 NOTE — TELEPHONE ENCOUNTER
Zack Rogers MD Johnson, Robin L RN; [facility] Special[facility] Triage 5 minutes ago (8:09 AM)     DY    Go ahead and add on to Monday morning before clinic starts, 8:30.  Please inform staffing I will need an MA because of the early start     Thanks    DY

## 2022-07-08 NOTE — TELEPHONE ENCOUNTER
Attempted to reach Claudia by phone, but no answer and voicemail full.  If she returns call, please assist in scheduling patient to see Dr Rogers on Thursday 7/14/22 at 11:30 before clinic and notify staffing.    Winifred Zimmer RN

## 2022-07-08 NOTE — TELEPHONE ENCOUNTER
Zack Rogers MD  You 39 minutes ago (9:00 AM)     DY    Then Thursday before clinic starts, 11:30     DY

## 2022-07-11 NOTE — TELEPHONE ENCOUNTER
Spoke with patient and , Claudia, and scheduled patient for 7/14/22 at 11:30 before clinic per Dr Rogers.    Notified staffing also.    Winifred Zimmer RN

## 2022-07-14 ENCOUNTER — OFFICE VISIT (OUTPATIENT)
Dept: OTOLARYNGOLOGY | Facility: CLINIC | Age: 67
End: 2022-07-14
Payer: MEDICARE

## 2022-07-14 ENCOUNTER — TELEPHONE (OUTPATIENT)
Dept: AUDIOLOGY | Facility: CLINIC | Age: 67
End: 2022-07-14

## 2022-07-14 VITALS
HEART RATE: 82 BPM | DIASTOLIC BLOOD PRESSURE: 70 MMHG | SYSTOLIC BLOOD PRESSURE: 134 MMHG | WEIGHT: 135 LBS | OXYGEN SATURATION: 98 % | BODY MASS INDEX: 22.47 KG/M2

## 2022-07-14 DIAGNOSIS — H60.393 INFECTIVE OTITIS EXTERNA, BILATERAL: ICD-10-CM

## 2022-07-14 DIAGNOSIS — H90.3 SNHL (SENSORY-NEURAL HEARING LOSS), ASYMMETRICAL: Primary | ICD-10-CM

## 2022-07-14 PROCEDURE — 99214 OFFICE O/P EST MOD 30 MIN: CPT | Performed by: OTOLARYNGOLOGY

## 2022-07-14 NOTE — PATIENT INSTRUCTIONS
PLAN:    The otitis externa is resolved but there is a residual effusion. Given that the LEFT is the only hearing ear, I am not eager to even do just a myringotomy.  We will wait and assume the fluid goes away, and if not he will return to me and we will do a myringotomy of the LEFT ear without tube placement..

## 2022-07-14 NOTE — LETTER
7/14/2022         RE: Faheem Jeff  [address]  [address]  [facility] [address]        Dear Colleague,    Thank you for referring your patient, Faheem Jeff, to the [facility]. Please see a copy of my visit note below.    History of Present Illness - Faheem Jeff is a 67 year old male here to see me in follow up from 7/22/2021 for an ear issue. He has seen Dr Cai in the past, for sensorineural hearing loss as well.    To review his situation, he has hearing loss in both ears, much worse in the right.  It has been present and noticeable for approximately many years.  It was not sudden in onset.  The patient has noticed increased difficulty hearing certain sounds and difficulty in understanding others, especially in noisy or crowded situations.  There is no history of recent head trauma, or chronic ear disease or ear surgery.  With regards to recreational, , and work-related noise exposure he has a lot for decades. No family history of hearing loss at a young age.      An audiogram and tympanogram were performed 1/24/2020 as part of the evaluation and personally reviewed. He has moderate sensorineural hearing loss left ear, but an asymmetric, much worse down-sloping to profound sensorineural hearing loss right ear with extremely poor word recognition scores on the right.  To rule out retrocochlear pathology, Dr. Cai recommended an MRI, but it was never done.     The patient comes back to me today wanting to ask more questions about his hearing loss.  He tells me that the hearing is still an issue.  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    He reached out to us after having progressive fullness and muffled hearing,  But this time in the LEFT ear. He had pain and swelling and was seen in urgent care and put him on drops.  That seems to have helped the pain and swelling but it still has some muffled  hearing.    Past medical history -   Patient Active Problem List   Diagnosis     Other, mixed, or unspecified nondependent drug abuse, continuous     Tobacco dependence     Traumatic brain injury (H)     EDWINA (obstructive sleep apnea)     Lumbar radicular pain     Insomnia due to medical condition     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     Hyperlipidemia     Headache, occipital     Essential hypertension     GERD (gastroesophageal reflux disease)     Diabetes mellitus, type II (H)     Depression with anxiety     Chronic pain disorder     Cervical radiculopathy     Adhesive capsulitis of shoulder     Homeless     Other chronic pain       There were no vitals taken for this visit.  /70 (BP Location: Right arm, Patient Position: Sitting, Cuff Size: Adult Regular)   Pulse 82   Wt 61.2 kg (135 lb)   SpO2 98%   BMI 22.47 kg/m      General - The patient is well nourished and well developed, and appears to have good nutritional status.  Alert and oriented to person and place, answers questions and cooperates with examination appropriately.   Head and Face - Normocephalic and atraumatic, with no gross asymmetry noted of the contour of the facial features.  The facial nerve is intact, with strong symmetric movements.  Eyes - Extraocular movements intact, and the pupils were reactive to light.  Sclera were not icteric or injected, conjunctiva were pink and moist.  Ear - The RIGHT ear is normal.  The LEFT canal now looks normal and clear, however, there is a definite clear yellow air fluid level.    A/P - Faheem L Tomer  (H90.5) SNHL (sensory-neural hearing loss), asymmetrical  (primary encounter diagnosis)  (H61.21) Impacted cerumen of right ear    The otitis externa is resolved but there is a residual effusion. Given that the LEFT is the only hearing ear, I am not eager to even do just a myringotomy.  We will wait and assume the fluid goes away, and if not he will return to me and we will do a myringotomy  of the LEFT ear without tube placement..        Again, thank you for allowing me to participate in the care of your patient.        Sincerely,        Zack Rogers MD

## 2022-07-14 NOTE — NURSING NOTE
"Chief Complaint   Patient presents with     Otitis Media       Vitals:    07/14/22 1136   BP: 134/70   BP Location: Right arm   Patient Position: Sitting   Cuff Size: Adult Regular   Pulse: 82   SpO2: 98%   Weight: 61.2 kg (135 lb)     Wt Readings from Last 1 Encounters:   07/14/22 61.2 kg (135 lb)     Ht Readings from Last 1 Encounters:   06/20/22 1.651 m (5' 5\")       Mine Romero [facility], 7/14/2022 11:37 AM    "

## 2022-07-14 NOTE — TELEPHONE ENCOUNTER
Patient stopped in for batteries and wax traps.  Eligible on 7/19/22 for 90 day supply.  Will bill then.    Jono Frances MA, CCC-A  [place] Licensed Audiologist #[id number]  [facility]        7/14/2022

## 2022-07-15 DIAGNOSIS — M54.12 CERVICAL RADICULOPATHY: ICD-10-CM

## 2022-07-15 RX ORDER — GABAPENTIN 300 MG/1
900 CAPSULE ORAL AT BEDTIME
Qty: 90 CAPSULE | Refills: 0 | Status: SHIPPED | OUTPATIENT
Start: 2022-07-15 | End: 2022-08-20

## 2022-08-17 DIAGNOSIS — M54.12 CERVICAL RADICULOPATHY: ICD-10-CM

## 2022-08-17 DIAGNOSIS — E78.5 HYPERLIPIDEMIA, UNSPECIFIED HYPERLIPIDEMIA TYPE: ICD-10-CM

## 2022-08-18 RX ORDER — ATORVASTATIN CALCIUM 40 MG/1
TABLET, FILM COATED ORAL
Qty: 90 TABLET | Refills: 1 | Status: SHIPPED | OUTPATIENT
Start: 2022-08-18 | End: 2023-08-19

## 2022-08-18 NOTE — TELEPHONE ENCOUNTER
Routing refill request to provider for review/approval because:  Drug not on the [facility] refill protocol       Concepcion Garrido RN, BSN

## 2022-08-20 RX ORDER — GABAPENTIN 300 MG/1
CAPSULE ORAL
Qty: 90 CAPSULE | Refills: 0 | Status: SHIPPED | OUTPATIENT
Start: 2022-08-20 | End: 2022-09-28

## 2022-09-21 DIAGNOSIS — E11.9 TYPE 2 DIABETES MELLITUS WITHOUT COMPLICATION, WITHOUT LONG-TERM CURRENT USE OF INSULIN (H): ICD-10-CM

## 2022-09-24 DIAGNOSIS — M54.12 CERVICAL RADICULOPATHY: ICD-10-CM

## 2022-09-28 RX ORDER — GABAPENTIN 300 MG/1
900 CAPSULE ORAL AT BEDTIME
Qty: 90 CAPSULE | Refills: 0 | Status: SHIPPED | OUTPATIENT
Start: 2022-09-28

## 2022-10-10 ENCOUNTER — OFFICE VISIT (OUTPATIENT)
Dept: URGENT CARE | Facility: URGENT CARE | Age: 67
End: 2022-10-10
Payer: MEDICARE

## 2022-10-10 DIAGNOSIS — R30.0 DYSURIA: ICD-10-CM

## 2022-10-10 DIAGNOSIS — N10 PYELONEPHRITIS, ACUTE: Primary | ICD-10-CM

## 2022-10-10 LAB
ALBUMIN UR-MCNC: NEGATIVE MG/DL
APPEARANCE UR: ABNORMAL
BACTERIA #/AREA URNS HPF: ABNORMAL /HPF
BILIRUB UR QL STRIP: NEGATIVE
COLOR UR AUTO: YELLOW
GLUCOSE UR STRIP-MCNC: NEGATIVE MG/DL
HGB UR QL STRIP: ABNORMAL
KETONES UR STRIP-MCNC: NEGATIVE MG/DL
LEUKOCYTE ESTERASE UR QL STRIP: ABNORMAL
NITRATE UR QL: NEGATIVE
PH UR STRIP: 6 [PH] (ref 5–7)
RBC #/AREA URNS AUTO: ABNORMAL /HPF
SP GR UR STRIP: <=1.005 (ref 1–1.03)
SQUAMOUS #/AREA URNS AUTO: ABNORMAL /LPF
UROBILINOGEN UR STRIP-ACNC: 0.2 E.U./DL
WBC #/AREA URNS AUTO: >100 /HPF
WBC CLUMPS #/AREA URNS HPF: PRESENT /HPF

## 2022-10-10 PROCEDURE — 87086 URINE CULTURE/COLONY COUNT: CPT | Performed by: FAMILY MEDICINE

## 2022-10-10 PROCEDURE — 87186 SC STD MICRODIL/AGAR DIL: CPT | Performed by: FAMILY MEDICINE

## 2022-10-10 PROCEDURE — 81001 URINALYSIS AUTO W/SCOPE: CPT | Performed by: FAMILY MEDICINE

## 2022-10-10 PROCEDURE — 87088 URINE BACTERIA CULTURE: CPT | Performed by: FAMILY MEDICINE

## 2022-10-10 PROCEDURE — 99214 OFFICE O/P EST MOD 30 MIN: CPT | Mod: 25 | Performed by: FAMILY MEDICINE

## 2022-10-10 PROCEDURE — 96372 THER/PROPH/DIAG INJ SC/IM: CPT | Performed by: FAMILY MEDICINE

## 2022-10-10 RX ORDER — CEFTRIAXONE SODIUM 1 G
1 VIAL (EA) INJECTION ONCE
Status: COMPLETED | OUTPATIENT
Start: 2022-10-10 | End: 2022-10-10

## 2022-10-10 RX ORDER — SULFAMETHOXAZOLE/TRIMETHOPRIM 800-160 MG
1 TABLET ORAL 2 TIMES DAILY
Qty: 20 TABLET | Refills: 0 | Status: SHIPPED | OUTPATIENT
Start: 2022-10-10 | End: 2022-10-20

## 2022-10-10 RX ADMIN — Medication 1 G: at 14:15

## 2022-10-10 NOTE — PROGRESS NOTES
"SUBJECTIVE: Faheem Jeff is a 67 year old male who  presents today for a possible UTI.   Symptoms of dysuria and frequency have been going on forday(s).    There is no history of fever, chills, nausea or vomiting.   This patient does have a history of urinary tract infections.     Past Medical History:   Diagnosis Date     Allergic state      Diabetes (H)      Displacement of cervical intervertebral disc without myelopathy      Hypertension      Osteoarthrosis, unspecified whether generalized or localized, unspecified site      Other, mixed, or unspecified nondependent drug abuse, continuous      Allergies   Allergen Reactions     Dust Mite Extract Unknown     Meclizine Other (See Comments)     Described \"feeling funny and burning.\"     Poplar Bud Extract Unknown     Trazodone Derivatives Unknown     \"Puts me in a coma\"     Social History     Tobacco Use     Smoking status: Every Day     Smokeless tobacco: Never   Substance Use Topics     Alcohol use: No       ROS: CONSTITUTIONAL:NEGATIVE for fever, chills, change in weight    OBJECTIVE:  There were no vitals taken for this visit.  NAD  left Flank pain      ICD-10-CM    1. Pyelonephritis, acute  N10 cefTRIAXone (ROCEPHIN) injection 1 g     sulfamethoxazole-trimethoprim (BACTRIM DS) 800-160 MG tablet      2. Dysuria  R30.0 UA macro with reflex to Microscopic and Culture - Clinc Collect     Urine Microscopic Exam     Urine Culture     cefTRIAXone (ROCEPHIN) injection 1 g     sulfamethoxazole-trimethoprim (BACTRIM DS) 800-160 MG tablet          Drink plenty of fluids.  Prevention and treatment of UTI's discussed.Signs and symptoms of pyelonephritis mentioned.  Follow up with primary care physician if not improving    "

## 2022-10-10 NOTE — PROGRESS NOTES
Clinic Administered Medication Documentation    Administrations This Visit     cefTRIAXone (ROCEPHIN) injection 1 g     Admin Date  10/10/2022 Action  Given Dose  1 g Route  Intramuscular Site  Right Ventrogluteal Administered By  Missy Claudio MA    Ordering Provider: Mu Chacko DO    NDC: [address]-9    Lot#: [id number]    : APOTEX    Patient Supplied?: No                  Injectable Medication Documentation    Patient was given Ceftriaxone Sodium (Rocephin). Prior to medication administration, verified patients identity using patient s name and date of birth. Please see MAR and medication order for additional information. Patient instructed to remain in clinic for 15 minutes and report any adverse reaction to staff immediately .      Was entire vial of medication used? Yes  Vial/Syringe: Single dose vial  Expiration Date:  08/2024  Was this medication supplied by the patient? No     IVON Claudio, Medical Assistant

## 2022-10-13 ENCOUNTER — TELEPHONE (OUTPATIENT)
Dept: AUDIOLOGY | Facility: CLINIC | Age: 67
End: 2022-10-13

## 2022-10-13 NOTE — TELEPHONE ENCOUNTER
[facility] Call Center    Phone Message    May a detailed message be left on voicemail: yes     Reason for Call: Other: Pt lost hearing aid and needs to get new one. Please contact Faheem or his ISHT worker to ler them know what next step is.Thank you     Action Taken: Message routed to:  [facility] mela ent    Travel Screening: Not Applicable

## 2022-10-14 LAB — BACTERIA UR CULT: ABNORMAL

## 2022-10-17 ENCOUNTER — TELEPHONE (OUTPATIENT)
Dept: AUDIOLOGY | Facility: CLINIC | Age: 67
End: 2022-10-17

## 2022-10-17 NOTE — TELEPHONE ENCOUNTER
[facility]    Phone Message    May a detailed message be left on voicemail: yes     Reason for Call: Other: Pt calling stating that he lost his left hearing aid and was told his Medical Assistance will pay for its replacement. Please advise.     Action Taken: Message routed to:  Other: [facility]    Travel Screening: Not Applicable

## 2022-10-19 NOTE — TELEPHONE ENCOUNTER
I informed Faheem that the left ear hearing aid has already been replaced through warranty and we would have to start a prior authorization for a new replacement. Faheem understands and asked me to call his  Jose Enrique at [phone] and let them know.     -Bib Mccall CCC-A

## 2022-11-19 ENCOUNTER — HEALTH MAINTENANCE LETTER (OUTPATIENT)
Age: 67
End: 2022-11-19

## 2022-11-24 DIAGNOSIS — E11.9 TYPE 2 DIABETES MELLITUS WITHOUT COMPLICATION, WITHOUT LONG-TERM CURRENT USE OF INSULIN (H): ICD-10-CM

## 2022-12-13 ENCOUNTER — TELEPHONE (OUTPATIENT)
Dept: AUDIOLOGY | Facility: CLINIC | Age: 67
End: 2022-12-13

## 2022-12-13 NOTE — TELEPHONE ENCOUNTER
Returned Faheem's call and apologized that we would be unable to accomodate a 45 minute late arrival. I will have scheduling reach out to schedule a new appointment.     -Bib Mccall CCC-A

## 2022-12-13 NOTE — TELEPHONE ENCOUNTER
[facility] Call Center    Phone Message    May a detailed message be left on voicemail: yes     Reason for Call: Other: Pt calling to see if he can arrive about 45 mins late due to transportation being late . Please reach out to pt to discuss .thank you      Action Taken: Message routed to:  Other: [facility]     Travel Screening: Not Applicable

## 2023-01-08 DIAGNOSIS — E11.9 TYPE 2 DIABETES MELLITUS WITHOUT COMPLICATION, WITHOUT LONG-TERM CURRENT USE OF INSULIN (H): ICD-10-CM

## 2023-01-10 NOTE — TELEPHONE ENCOUNTER
Patient needs to be seen by provider and lab   (provider or resource)  Is there a time frame in which the patient should be seen Yes: within month  What does the patient need to be seen regarding diabetes mellitus    Does patient need to come fasting No  Phone: [phone] (home)  When workflow completed Route to provider if refill needed to get to follow up appointment.      Clinic: [facility] at [phone]    'Hi, this is (your name) I am calling from (provider's name) office. After reviewing your chart, (Provider's name) has indicated that you need an appointment to review (reason for visit). May I assist you in making this appointment? (Please contact us via Birdbox or by phone at (Clinic name and Phone number) to schedule this appointment at your earliest convenience)'    Was first outreach attempted?No  If yes, the Second attempt due on:

## 2023-01-24 ENCOUNTER — TELEPHONE (OUTPATIENT)
Dept: AUDIOLOGY | Facility: CLINIC | Age: 68
End: 2023-01-24

## 2023-01-24 NOTE — TELEPHONE ENCOUNTER
[facility] Call Center    Phone Message    May a detailed message be left on voicemail: no     Reason for Call: Other: pt would like to know if he can just  hearing aids and schedule an appt later he has no transportation yet to make 11 am appt today so can he just  hearing aids for now. please call and discuss options     Action Taken: Other: routing to  audiology    Travel Screening: Not Applicable

## 2023-01-24 NOTE — TELEPHONE ENCOUNTER
Multiple attempts have been made to contact patient. The cell phone is not in service and the home phone does not have an answering machine. An in person appointment is necessary as there are in ear measurements to be made.    -Bib Mccall CCC-A

## 2023-01-24 NOTE — TELEPHONE ENCOUNTER
[facility] Call [facility]    Phone Message    May a detailed message be left on voicemail: yes     Reason for Call: Other: Pt called in about missing his appt and has some questions about his HA's . Please call to discuss. Thank you     Action Taken: Other: AUDIO    Travel Screening: Not Applicable

## 2023-01-25 NOTE — TELEPHONE ENCOUNTER
I left a voicemail letting him know that we would need an in person visit as we have to take some measurements of the response. Provided the number to schedule the appointment.     -Bib Mccall CCC-A     done

## 2023-02-03 NOTE — TELEPHONE ENCOUNTER
Called pt and scheduled him for appt. He does not need a refill at this time.  Please remove pended med and close encounter. I do not have security to close the encounter.Thanks!

## 2023-02-09 DIAGNOSIS — E11.9 TYPE 2 DIABETES MELLITUS WITHOUT COMPLICATION, WITHOUT LONG-TERM CURRENT USE OF INSULIN (H): ICD-10-CM

## 2023-02-09 NOTE — TELEPHONE ENCOUNTER
"Patient calling to request refill of metformin HCL, patient has appointment scheduled for 2/20/23. Patient states he is dependent on getting transportation through a medical company and has a ride scheduled today \"so it would be nice if I could get the medication today otherwise I will have to wait another two days\". Patient states he is out of medication.     Routing to provider to review and advise due to one javier refill already given.    Tianna Pascal RN    [facility]    "

## 2023-02-10 DIAGNOSIS — E11.9 TYPE 2 DIABETES MELLITUS WITHOUT COMPLICATION, WITHOUT LONG-TERM CURRENT USE OF INSULIN (H): ICD-10-CM

## 2023-02-10 NOTE — TELEPHONE ENCOUNTER
Reason for Call:  Medication or medication refill:    Do you use a [facility]?  Name of the pharmacy and phone number for the current request:  [facility] [place], [place]     Name of the medication requested: metFORMIN (GLUCOPHAGE) 500 MG tablet    Other request:     Can we leave a detailed message on this number? YES    Phone number patient can be reached at: Cell number on file:    Telephone Information:   Mobile [phone]       Best Time:     Call taken on 2/10/2023 at 1:39 PM by Luiza Flores

## 2023-02-10 NOTE — TELEPHONE ENCOUNTER
Called patient to inform patient appointment is needed for additional refills. Patient states has appointment scheduled for 2/20/23 but is out of medication and has not taken his morning dose today. Patient requesting refill until his appointment.     If refill approved or denied, please call patient back with update.     Routing to provider to review and advise.     KOBE Moss  [facility]

## 2023-03-12 DIAGNOSIS — E11.9 TYPE 2 DIABETES MELLITUS WITHOUT COMPLICATION, WITHOUT LONG-TERM CURRENT USE OF INSULIN (H): ICD-10-CM

## 2023-03-14 NOTE — TELEPHONE ENCOUNTER
Patient needs to be seen by Dinorah Correa MD  (provider or resource)  Is there a time frame in which the patient should be seen Yes: within month - virtual ok - lab visit prior  What does the patient need to be seen regarding Diabetes   Does patient need to come fasting No  Phone: [phone] (home)  When workflow completed Route to provider if refill needed to get to follow up appointment.    Clinic: [facility] at [phone]    'Hi, this is (your name) I am calling from (provider's name) office. After reviewing your chart, (Provider's name) has indicated that you need an appointment to review (reason for visit). May I assist you in making this appointment? (Please contact us via Qraniot or by phone at (Clinic name and Phone number) to schedule this appointment at your earliest convenience)'    Was first outreach attempted?No  If yes, the Second attempt due on:

## 2023-03-31 DIAGNOSIS — E11.9 TYPE 2 DIABETES MELLITUS WITHOUT COMPLICATION, WITHOUT LONG-TERM CURRENT USE OF INSULIN (H): ICD-10-CM

## 2023-04-04 NOTE — TELEPHONE ENCOUNTER
Message routed 3 weeks ago and never addressed.   Please call patient to assist with scheduling follow up appointment.  PSK

## 2023-04-11 NOTE — TELEPHONE ENCOUNTER
Attempted to sched pt and he said he wants to establish care closer to home it is to hard to get here anymore he lives in [place] and is wondering if he can get one more refill to allow him time to est care closer to home. He is wondering if PCP has some type of recommendation for a provider that is in [place] within [place]. If there is a provider outside of  he will need a referral. He said he is going to follow up with [facility] as well. He is requesting an additional month.  Winifred Martínez, CMA

## 2023-04-26 ENCOUNTER — TELEPHONE (OUTPATIENT)
Dept: FAMILY MEDICINE | Facility: CLINIC | Age: 68
End: 2023-04-26
Payer: MEDICARE

## 2023-04-26 NOTE — TELEPHONE ENCOUNTER
Patient's IHS (state assigned) worker, Swetha, called to clinic on patient's behalf to inquire about Metformin refill.  Patient present at time of call, is very [place] so IHS worker is assisting patient. Verbal permission given by patient to speak with .    Patient stating he needs a refill on his Metformin and noted they tried to schedule patient for OV with PCP but provider booking out until August.    Per review of chart, patient was given 30 day supply of Metformin on 4/11/23, [facility] [place].  Writer asked for patient or worker to confirm this was received with [employer] and should still be on file then and to request refill.    Patient is overdue for diabetes check up and visit. Needs lab work as well. Has hard time getting to appointments, needs rides to any visits. Has to cancel often as rides fall through.  Writer reviewed importance of getting to appointments regarding diabetes care.  IHS worker is willing to assist patient to getting to appointment, will drive patient herself.  Since patient is well past due for visit and will need to be seen for any further refills (within 30 days), writer assisted in scheduling patient for sooner visit with PCP. Patient scheduled for 5/8/23 at 11:20 am with Dr. Correa. Informed to arrive to clinic by 11:00 am for check in.  Both patient and IHS worker agreed with plan. If have any issues with getting [facility], will call back to clinic.      Bonny Regalado RN  Clinical Triage/Primary Care  [facility]

## 2023-04-27 ENCOUNTER — TELEPHONE (OUTPATIENT)
Dept: FAMILY MEDICINE | Facility: CLINIC | Age: 68
End: 2023-04-27
Payer: MEDICARE

## 2023-04-27 NOTE — TELEPHONE ENCOUNTER
Forms/Letter Request    Type of form/letter: [facility] [place]    Have you been seen for this request: N/A    Do we have the form/letter: Yes: will place form on providers desk for review/signature    When is form/letter needed by: asap    How would you like the form/letter returned: Fax : [phone]

## 2023-05-01 ENCOUNTER — TELEPHONE (OUTPATIENT)
Dept: FAMILY MEDICINE | Facility: CLINIC | Age: 68
End: 2023-05-01
Payer: MEDICARE

## 2023-05-01 NOTE — TELEPHONE ENCOUNTER
Patient Quality Outreach    Patient is due for the following:   Diabetes -  A1C, Eye Exam and Foot Exam  Physical  - Medicare Annual Wellness      Topic Date Due     Pneumococcal Vaccine (1 - PCV) Never done     Zoster (Shingles) Vaccine (2 of 3) 11/26/2013     COVID-19 Vaccine (5 - Booster for Pfizer series) 07/22/2022     Flu Vaccine (1) 09/01/2022       Next Steps:   Schedule a Annual Wellness Visit    Type of outreach:    Sent CS-Keys message.      Questions for provider review:    None     Diane L. Schoenherr, RN

## 2023-05-23 DIAGNOSIS — E11.9 TYPE 2 DIABETES MELLITUS WITHOUT COMPLICATION, WITHOUT LONG-TERM CURRENT USE OF INSULIN (H): ICD-10-CM

## 2023-06-01 ENCOUNTER — HEALTH MAINTENANCE LETTER (OUTPATIENT)
Age: 68
End: 2023-06-01

## 2023-06-12 ENCOUNTER — OFFICE VISIT (OUTPATIENT)
Dept: AUDIOLOGY | Facility: CLINIC | Age: 68
End: 2023-06-12
Payer: MEDICARE

## 2023-06-12 DIAGNOSIS — H90.6 MIXED HEARING LOSS, BILATERAL: Primary | ICD-10-CM

## 2023-06-12 PROCEDURE — V5266 BATTERY FOR HEARING DEVICE: HCPCS | Mod: NU | Performed by: AUDIOLOGIST

## 2023-06-12 PROCEDURE — V5020 CONFORMITY EVALUATION: HCPCS | Performed by: AUDIOLOGIST

## 2023-06-12 PROCEDURE — V5241 DISPENSING FEE, MONAURAL: HCPCS | Mod: LT | Performed by: AUDIOLOGIST

## 2023-06-12 PROCEDURE — V5011 HEARING AID FITTING/CHECKING: HCPCS | Performed by: AUDIOLOGIST

## 2023-06-12 PROCEDURE — 92592 PR HEARING AID CHECK, MONAURAL: CPT | Performed by: AUDIOLOGIST

## 2023-06-12 PROCEDURE — V5256 HEARING AID, DIGIT, MON, ITE: HCPCS | Mod: NU | Performed by: AUDIOLOGIST

## 2023-06-12 NOTE — PROGRESS NOTES
AUDIOLOGY REPORT    SUBJECTIVE: Faheem Jeff, a 68 year old male, was seen in the Audiology Clinic at [facility] today for a hearing aid refitting. Patient was accompanied to today's appointment by his S worker.     OBJECTIVE:  Prior to fitting, a hearing aid check was performed to ensure device functionality. The hearing aid conformity evaluation was completed.The hearing aids were placed and they provided a good fit. Real-ear-probe-microphone measurements were completed on the [address] system and were an acceptable match to NAL-NL2 target with soft sounds audible, moderate sounds comfortable, and loud sounds below discomfort. UCLs are verified through maximum power output measures and demonstrate appropriate limiting of loud inputs. Mr. Jeff was oriented to proper hearing aid use, care, cleaning (no water, dry brush), batteries (size 13, insertion/removal, toxicity, low-battery signal), aid insertion/removal, user booklet, warranty information, storage cases, and other hearing aid details. The patient confirmed understanding of hearing aid use and care, and showed proper insertion of hearing aid and batteries while in the office today. Mr. Jeff reported good volume and sound quality today. Patient also requested his 90 day supply of batteries, 36 size 13 batteries provided.     EAR(S) FIT: Binaural  MA HEARING AID MAKE: Right: Phonak CROS B-13; Left: Phonak Virto B70-13    MA HEARING AID MODEL #: Right: [id number]; Left: [id number]  HEARING AID STYLE: Right: CROS ITE; Left: ITE  SERIAL NUMBERS: Right: [id number]; Left: [id number]  WARRANTY END DATE: Right: 9/1/2022; Left:: 2/4/2025      (The patient Does Not Require a signed a waiver that is on file agreeing to the upgrade charge, per their insurance contract. )        ASSESSMENT: Left ear hearing aid fitting completed today. Verification measures were performed. The 45 day trial period was explained to patient, and they expressed  understanding. Mr. Jeff signed the Hearing Aid Purchase Agreement and was given a copy, as well as details on his hearing aids. Patient was counseled that exact out of pocket amounts cannot be determined for hearing aid claims being sent to insurance. Any insurance coverage information presented to the patient is an estimate only, and is not a guarantee of payment. Patient has been advised to check with their own insurance.    PLAN: Mr. Jeff will return for follow-up in 2-3 weeks for a hearing aid review appointment. Please call this clinic with questions regarding today s appointment.    Bib Mccall [facility]  Licensed Audiologist #[id number]  6/12/2023

## 2023-06-12 NOTE — PATIENT INSTRUCTIONS

## 2023-06-21 ENCOUNTER — TELEPHONE (OUTPATIENT)
Dept: FAMILY MEDICINE | Facility: CLINIC | Age: 68
End: 2023-06-21
Payer: MEDICARE

## 2023-06-21 ENCOUNTER — TELEPHONE (OUTPATIENT)
Dept: AUDIOLOGY | Facility: CLINIC | Age: 68
End: 2023-06-21
Payer: MEDICARE

## 2023-06-21 NOTE — TELEPHONE ENCOUNTER
[facility] Call Center    Phone Message    May a detailed message be left on voicemail: yes     Reason for Call: Other: Pt IHS worker Mya calling in to let Yuval Know that the pt hearing aids do not fit and they would like to have new ones ordered. Please reach out to Mya so she can discuss what the options are with you and with the patient. Thank you      Action Taken: Message routed to:  [facility]    Travel Screening: Not Applicable

## 2023-06-21 NOTE — TELEPHONE ENCOUNTER
Left a message asking for clarification and informed him if they aid and CROS are not fitting we will have to schedule an appointment to take new impressions.     -Bib Mccall CCC-A

## 2023-06-22 NOTE — TELEPHONE ENCOUNTER
After talking with Faheem it was determined that a new impression of the left ear would be best to re-case the new hearing aid as it is causing discomfort. He was offered 11 am on 6/27 and he accepted. Appointment was scheduled.     -Bib FRANKA

## 2023-06-29 NOTE — TELEPHONE ENCOUNTER
Pt called back. Transportation did not come for the 6.27 appt. We rescheduled him for 7.18.23 but if you can move it closer, he would appreciate it. Please call the pt to discuss. Thanks.

## 2023-07-01 DIAGNOSIS — E11.9 TYPE 2 DIABETES MELLITUS WITHOUT COMPLICATION, WITHOUT LONG-TERM CURRENT USE OF INSULIN (H): ICD-10-CM

## 2023-07-19 ENCOUNTER — TELEPHONE (OUTPATIENT)
Dept: FAMILY MEDICINE | Facility: CLINIC | Age: 68
End: 2023-07-19
Payer: COMMERCIAL

## 2023-07-19 DIAGNOSIS — M54.50 ACUTE BILATERAL LOW BACK PAIN WITHOUT SCIATICA: Primary | ICD-10-CM

## 2023-07-19 DIAGNOSIS — S06.9X9D TRAUMATIC BRAIN INJURY WITH LOSS OF CONSCIOUSNESS, SUBSEQUENT ENCOUNTER: ICD-10-CM

## 2023-07-19 NOTE — TELEPHONE ENCOUNTER
Mya, S worker, is calling on patient behalf. Patient is requesting and needing new battery on scooter.     Provider: spoke with Haroldo at [employer] [facility] and prompted orders below.    Haroldo at [facility]:    Main: [phone]    Fax:  [phone]    Emy Srivastava RN

## 2023-08-02 DIAGNOSIS — E11.9 TYPE 2 DIABETES MELLITUS WITHOUT COMPLICATION, WITHOUT LONG-TERM CURRENT USE OF INSULIN (H): ICD-10-CM

## 2023-08-03 NOTE — TELEPHONE ENCOUNTER
Appointment required for 90 day supply.  30 day supply sent.   Hemoglobin A1C   Date Value Ref Range Status   02/21/2022 6.9 (H) 0.0 - 5.6 % Final     Comment:     Normal <5.7%   Prediabetes 5.7-6.4%    Diabetes 6.5% or higher     Note: Adopted from ADA consensus guidelines.   05/13/2021 7.3 (H) 0 - 5.6 % Final     Comment:     Normal <5.7% Prediabetes 5.7-6.4%  Diabetes 6.5% or higher - adopted from ADA   consensus guidelines.       Has appointment scheduled - 30 day supply sent to get to follow up appointment. MARIE

## 2023-08-17 ENCOUNTER — OFFICE VISIT (OUTPATIENT)
Dept: FAMILY MEDICINE | Facility: CLINIC | Age: 68
End: 2023-08-17
Payer: COMMERCIAL

## 2023-08-17 VITALS
WEIGHT: 137.3 LBS | DIASTOLIC BLOOD PRESSURE: 78 MMHG | RESPIRATION RATE: 17 BRPM | HEART RATE: 88 BPM | OXYGEN SATURATION: 97 % | BODY MASS INDEX: 23.44 KG/M2 | TEMPERATURE: 97.9 F | HEIGHT: 64 IN | SYSTOLIC BLOOD PRESSURE: 137 MMHG

## 2023-08-17 DIAGNOSIS — Z79.4 TYPE 2 DIABETES MELLITUS WITHOUT COMPLICATION, WITH LONG-TERM CURRENT USE OF INSULIN (H): ICD-10-CM

## 2023-08-17 DIAGNOSIS — F17.200 TOBACCO DEPENDENCE: ICD-10-CM

## 2023-08-17 DIAGNOSIS — I10 ESSENTIAL HYPERTENSION: ICD-10-CM

## 2023-08-17 DIAGNOSIS — E11.9 TYPE 2 DIABETES MELLITUS WITHOUT COMPLICATION, WITH LONG-TERM CURRENT USE OF INSULIN (H): ICD-10-CM

## 2023-08-17 DIAGNOSIS — Z00.00 ENCOUNTER FOR MEDICARE ANNUAL WELLNESS EXAM: Primary | ICD-10-CM

## 2023-08-17 DIAGNOSIS — Z12.11 COLON CANCER SCREENING: ICD-10-CM

## 2023-08-17 DIAGNOSIS — C67.9 MALIGNANT NEOPLASM OF URINARY BLADDER, UNSPECIFIED SITE (H): ICD-10-CM

## 2023-08-17 DIAGNOSIS — E78.5 HYPERLIPIDEMIA, UNSPECIFIED HYPERLIPIDEMIA TYPE: ICD-10-CM

## 2023-08-17 DIAGNOSIS — F14.10 COCAINE SUBSTANCE ABUSE (H): ICD-10-CM

## 2023-08-17 DIAGNOSIS — S06.9XAS TRAUMATIC BRAIN INJURY, WITH UNKNOWN LOSS OF CONSCIOUSNESS STATUS, SEQUELA (H): ICD-10-CM

## 2023-08-17 DIAGNOSIS — Z13.6 ENCOUNTER FOR ABDOMINAL AORTIC ANEURYSM (AAA) SCREENING: ICD-10-CM

## 2023-08-17 DIAGNOSIS — F14.11 COCAINE ABUSE, IN REMISSION (H): ICD-10-CM

## 2023-08-17 LAB — HBA1C MFR BLD: 7.3 % (ref 0–5.6)

## 2023-08-17 PROCEDURE — 82570 ASSAY OF URINE CREATININE: CPT | Performed by: FAMILY MEDICINE

## 2023-08-17 PROCEDURE — 80061 LIPID PANEL: CPT | Performed by: FAMILY MEDICINE

## 2023-08-17 PROCEDURE — 80053 COMPREHEN METABOLIC PANEL: CPT | Performed by: FAMILY MEDICINE

## 2023-08-17 PROCEDURE — 99214 OFFICE O/P EST MOD 30 MIN: CPT | Mod: 25 | Performed by: FAMILY MEDICINE

## 2023-08-17 PROCEDURE — 99207 PR FOOT EXAM NO CHARGE: CPT | Performed by: FAMILY MEDICINE

## 2023-08-17 PROCEDURE — 82043 UR ALBUMIN QUANTITATIVE: CPT | Performed by: FAMILY MEDICINE

## 2023-08-17 PROCEDURE — 83036 HEMOGLOBIN GLYCOSYLATED A1C: CPT | Performed by: FAMILY MEDICINE

## 2023-08-17 PROCEDURE — 36415 COLL VENOUS BLD VENIPUNCTURE: CPT | Performed by: FAMILY MEDICINE

## 2023-08-17 PROCEDURE — G0439 PPPS, SUBSEQ VISIT: HCPCS | Performed by: FAMILY MEDICINE

## 2023-08-17 ASSESSMENT — ENCOUNTER SYMPTOMS
CONSTIPATION: 0
MYALGIAS: 0
DYSURIA: 0
PALPITATIONS: 0
SORE THROAT: 0
FEVER: 0
HEMATURIA: 0
HEMATOCHEZIA: 0
EYE PAIN: 0
DIZZINESS: 0
DIARRHEA: 0
ARTHRALGIAS: 0
NERVOUS/ANXIOUS: 0
COUGH: 0
SHORTNESS OF BREATH: 0
PARESTHESIAS: 0
ABDOMINAL PAIN: 0
WEAKNESS: 0
HEARTBURN: 0
FREQUENCY: 0
JOINT SWELLING: 0
NAUSEA: 0
HEADACHES: 0
CHILLS: 0

## 2023-08-17 ASSESSMENT — PAIN SCALES - GENERAL: PAINLEVEL: NO PAIN (0)

## 2023-08-17 ASSESSMENT — ACTIVITIES OF DAILY LIVING (ADL): CURRENT_FUNCTION: NO ASSISTANCE NEEDED

## 2023-08-17 NOTE — PATIENT INSTRUCTIONS
Lab today  Refills will be sent when results return.  Follow up with eye doctor recommended in September.    Cardiology and sleep clinic follow up is recommended.      Ultrasound of the abdomen recommended to screening for aneurysm.  Please call [phone] to set up this appointment.    I have sent in a script for the freestyle testing supplies for blood sugar.  Referral to diabetic education regarding use of this and other diabetic concerns.    Referral for colon cancer screening - someone will call to set up that appointment.      Patient Education   Personalized Prevention Plan  You are due for the preventive services outlined below.  Your care team is available to assist you in scheduling these services.  If you have already completed any of these items, please share that information with your care team to update in your medical record.  Health Maintenance Due   Topic Date Due    Pneumococcal Vaccine (1 - PCV) Never done    Colorectal Cancer Screening  Never done    LUNG CANCER SCREENING  Never done    Zoster (Shingles) Vaccine (2 of 3) 11/26/2013    AORTIC ANEURYSM SCREENING (SYSTEM ASSIGNED)  Never done    URINE DRUG SCREEN  03/11/2020    Annual Wellness Visit  01/08/2021    Diabetic Foot Exam  01/08/2021    Eye Exam  01/01/2022    COVID-19 Vaccine (5 - Pfizer series) 07/22/2022    A1C Lab  08/21/2022    Basic Metabolic Panel  02/21/2023    Cholesterol Lab  02/21/2023    Kidney Microalbumin Urine Test  02/21/2023

## 2023-08-17 NOTE — PROGRESS NOTES
"SUBJECTIVE:   Faheem is a 68 year old who presents for Preventive Visit.      8/17/2023     4:06 PM   Additional Questions   Roomed by CHENTE MERCHANT   Accompanied by SELF       Are you in the first 12 months of your Medicare coverage?  No    Healthy Habits:     In general, how would you rate your overall health?  Good    Frequency of exercise:  None    Do you usually eat at least 4 servings of fruit and vegetables a day, include whole grains    & fiber and avoid regularly eating high fat or \"junk\" foods?  Yes    Taking medications regularly:  Yes    Medication side effects:  None    Ability to successfully perform activities of daily living:  No assistance needed    Home Safety:  No safety concerns identified    Hearing Impairment:  Difficulty following a conversation in a noisy restaurant or crowded room, feel that people are mumbling or not speaking clearly, difficulty following dialogue in the theater, difficult to understand a speaker at a public meeting or [employer] service, need to ask people to speak up or repeat themselves, find that men's voices are easier to understand than woman's, difficulty understanding soft or whispered speech and difficulty understanding speech on the telephone    In the past 6 months, have you been bothered by leaking of urine?  No    In general, how would you rate your overall mental or emotional health?  Excellent    Additional concerns today:  No        Have you ever done Advance Care Planning? (For example, a Health Directive, POLST, or a discussion with a medical provider or your loved ones about your wishes): No, advance care planning information given to patient to review.  Patient declined advance care planning discussion at this time.       Fall risk  Fallen 2 or more times in the past year?: No  Any fall with injury in the past year?: No    Cognitive Screening   1) Repeat 3 items (Leader, Season, Table)    2) Clock draw: NORMAL  3) 3 item recall: Recalls 1 object   Results: " NORMAL clock, 1-2 items recalled: COGNITIVE IMPAIRMENT LESS LIKELY    Mini-CogTM Copyright REKHA Mortensen. Licensed by the author for use in [facility]; reprinted with permission (melany@[facility]). All rights reserved.      Do you have sleep apnea, excessive snoring or daytime drowsiness? : yes    Reviewed and updated as needed this visit by clinical staff   Tobacco  Allergies  Meds   Med Hx  Surg Hx  Fam Hx          Reviewed and updated as needed this visit by Provider   Tobacco  Allergies  Meds   Med Hx  Surg Hx  Fam Hx         Social History     Tobacco Use     Smoking status: Every Day     Packs/day: 0.50     Types: Cigarettes     Smokeless tobacco: Never   Substance Use Topics     Alcohol use: No             8/17/2023     4:03 PM   Alcohol Use   Prescreen: >3 drinks/day or >7 drinks/week? Not Applicable     Do you have a current opioid prescription? No  Do you use any other controlled substances or medications that are not prescribed by a provider? None          Diabetes Follow-up    How often are you checking your blood sugar? Not at all  What concerns do you have today about your diabetes? None   Do you have any of these symptoms? (Select all that apply)  Numbness in feet  Have you had a diabetic eye exam in the last 12 months? No        BP Readings from Last 2 Encounters:   08/17/23 137/78   07/14/22 134/70     Hemoglobin A1C (%)   Date Value   02/21/2022 6.9 (H)   05/13/2021 7.3 (H)   09/21/2020 6.6 (H)     LDL Cholesterol Calculated (mg/dL)   Date Value   02/21/2022 93   09/21/2020 135 (H)   12/05/2019 203 (H)             Hyperlipidemia Follow-Up    Are you regularly taking any medication or supplement to lower your cholesterol?   Yes- atorvastatin  Are you having muscle aches or other side effects that you think could be caused by your cholesterol lowering medication?  No    Hypertension Follow-up    Do you check your blood pressure regularly outside of the clinic? No   Are you following a  low salt diet? Yes  Are your blood pressures ever more than 140 on the top number (systolic) OR more   than 90 on the bottom number (diastolic), for example 140/90? No        Current providers sharing in care for this patient include:   Patient Care Team:  Dinorah Correa MD as PCP - General (Family Practice)  Zack Rogers MD as Assigned Surgical Provider  Yumiko Patterson APRN CNP as Assigned PCP  Bib Kent AuD as Audiologist (Audiology)    The following health maintenance items are reviewed in Epic and correct as of today:  Health Maintenance   Topic Date Due     Pneumococcal Vaccine: 65+ Years (1 - PCV) Never done     COLORECTAL CANCER SCREENING  Never done     LUNG CANCER SCREENING  Never done     ZOSTER IMMUNIZATION (2 of 3) 11/26/2013     AORTIC ANEURYSM SCREENING (SYSTEM ASSIGNED)  Never done     URINE DRUG SCREEN  03/11/2020     MEDICARE ANNUAL WELLNESS VISIT  01/08/2021     DIABETIC FOOT EXAM  01/08/2021     EYE EXAM  01/01/2022     COVID-19 Vaccine (5 - Pfizer series) 07/22/2022     A1C  08/21/2022     BMP  02/21/2023     LIPID  02/21/2023     MICROALBUMIN  02/21/2023     INFLUENZA VACCINE (1) 09/01/2023     NICOTINE/TOBACCO CESSATION COUNSELING Q 1 YR  08/17/2024     ANNUAL REVIEW OF HM ORDERS  08/17/2024     FALL RISK ASSESSMENT  08/17/2024     ADVANCE CARE PLANNING  08/17/2028     DTAP/TDAP/TD IMMUNIZATION (3 - Td or Tdap) 12/12/2031     HEPATITIS C SCREENING  Completed     PHQ-2 (once per calendar year)  Completed     IPV IMMUNIZATION  Aged Out     MENINGITIS IMMUNIZATION  Aged Out     BP Readings from Last 3 Encounters:   08/17/23 137/78   07/14/22 134/70   06/20/22 125/75    Wt Readings from Last 3 Encounters:   08/17/23 62.3 kg (137 lb 4.8 oz)   07/14/22 61.2 kg (135 lb)   06/20/22 61.2 kg (135 lb)                          Review of Systems   Constitutional:  Negative for chills and fever.   HENT:  Positive for hearing loss. Negative for congestion, ear pain and sore  "throat.    Eyes:  Negative for pain and visual disturbance.   Respiratory:  Negative for cough and shortness of breath.    Cardiovascular:  Negative for chest pain, palpitations and peripheral edema.   Gastrointestinal:  Negative for abdominal pain, constipation, diarrhea, heartburn, hematochezia and nausea.   Genitourinary:  Positive for impotence and penile discharge. Negative for dysuria, frequency, genital sores, hematuria and urgency.   Musculoskeletal:  Negative for arthralgias, joint swelling and myalgias.   Skin:  Negative for rash.   Neurological:  Negative for dizziness, weakness, headaches and paresthesias.   Psychiatric/Behavioral:  Negative for mood changes. The patient is not nervous/anxious.      Has hearing aids.  Declines penile discharge or ED issues on questioning.     OBJECTIVE:   /78 (BP Location: Right arm, Patient Position: Sitting, Cuff Size: Adult Regular)   Pulse 88   Temp 97.9  F (36.6  C) (Oral)   Resp 17   Ht 1.613 m (5' 3.5\")   Wt 62.3 kg (137 lb 4.8 oz)   SpO2 97%   BMI 23.94 kg/m   Estimated body mass index is 23.94 kg/m  as calculated from the following:    Height as of this encounter: 1.613 m (5' 3.5\").    Weight as of this encounter: 62.3 kg (137 lb 4.8 oz).  Physical Exam  GENERAL: alert and no distress  EYES: Eyes grossly normal to inspection, PERRL and conjunctivae and sclerae normal  HENT: ear canals and TM's normal, nose and mouth without ulcers or lesions  NECK: no adenopathy, no asymmetry, masses, or scars and thyroid normal to palpation  RESP: lungs clear to auscultation - no rales, rhonchi or wheezes  CV: regular rate and rhythm, normal S1 S2, no S3 or S4, no murmur, click or rub, no peripheral edema and peripheral pulses strong  ABDOMEN: soft, nontender, no hepatosplenomegaly, no masses and bowel sounds normal  MS: no gross musculoskeletal defects noted, no edema  SKIN: no suspicious lesions or rashes  NEURO: Normal strength and tone, sensory exam grossly " normal, gait abnormal: antalgic, and memory short term poor  PSYCH: affect normal/bright, judgement and insight intact, and appearance disheveled  Diabetic foot exam: normal DP and PT pulses, no trophic changes or ulcerative lesions, normal sensory exam, and normal monofilament exam, except for findings noted on diabetic foot graphical image.    Absent sensation at 10 bilaterally          Diagnostic Test Results:  Labs reviewed in Epic  Results for orders placed or performed in visit on 08/17/23   HEMOGLOBIN A1C     Status: Abnormal   Result Value Ref Range    Hemoglobin A1C 7.3 (H) 0.0 - 5.6 %   Lipid panel reflex to direct LDL Non-fasting     Status: Normal   Result Value Ref Range    Cholesterol 165 <200 mg/dL    Triglycerides 97 <150 mg/dL    Direct Measure HDL 47 >=40 mg/dL    LDL Cholesterol Calculated 99 <=100 mg/dL    Non HDL Cholesterol 118 <130 mg/dL    Narrative    Cholesterol  Desirable:  <200 mg/dL    Triglycerides  Normal:  Less than 150 mg/dL  Borderline High:  150-199 mg/dL  High:  200-499 mg/dL  Very High:  Greater than or equal to 500 mg/dL    Direct Measure HDL  Female:  Greater than or equal to 50 mg/dL   Male:  Greater than or equal to 40 mg/dL    LDL Cholesterol  Desirable:  <100mg/dL  Above Desirable:  100-129 mg/dL   Borderline High:  130-159 mg/dL   High:  160-189 mg/dL   Very High:  >= 190 mg/dL    Non HDL Cholesterol  Desirable:  130 mg/dL  Above Desirable:  130-159 mg/dL  Borderline High:  160-189 mg/dL  High:  190-219 mg/dL  Very High:  Greater than or equal to 220 mg/dL   Albumin Random Urine Quantitative with Creat Ratio     Status: None   Result Value Ref Range    Creatinine Urine mg/dL 109.0 mg/dL    Albumin Urine mg/L 15.8 mg/L    Albumin Urine mg/g Cr 14.50 0.00 - 17.00 mg/g Cr   Comprehensive metabolic panel (BMP + Alb, Alk Phos, ALT, AST, Total. Bili, TP)     Status: Abnormal   Result Value Ref Range    Sodium 139 136 - 145 mmol/L    Potassium 4.4 3.4 - 5.3 mmol/L    Chloride 101  98 - 107 mmol/L    Carbon Dioxide (CO2) 26 22 - 29 mmol/L    Anion Gap 12 7 - 15 mmol/L    Urea Nitrogen 26.7 (H) 8.0 - 23.0 mg/dL    Creatinine 1.00 0.67 - 1.17 mg/dL    Calcium 9.9 8.8 - 10.2 mg/dL    Glucose 140 (H) 70 - 99 mg/dL    Alkaline Phosphatase 93 40 - 129 U/L    AST 38 0 - 45 U/L    ALT 42 0 - 70 U/L    Protein Total 7.2 6.4 - 8.3 g/dL    Albumin 4.2 3.5 - 5.2 g/dL    Bilirubin Total 0.2 <=1.2 mg/dL    GFR Estimate 81 >60 mL/min/1.73m2       ASSESSMENT / PLAN:   (Z00.00) Encounter for Medicare annual wellness exam  (primary encounter diagnosis)  Comment: non-fasting.  Plan: screening and preventative care discussed.  Declines immunization update.     (E11.9,  Z79.4) Type 2 diabetes mellitus without complication, with long-term current use of insulin (H)  Comment: higher A1C compared with previous.   Has not been monitoring blood sugars.    Plan: HEMOGLOBIN A1C, Lipid panel reflex to direct         LDL Non-fasting, Albumin Random Urine         Quantitative with Creat Ratio, Continuous Blood        Gluc  (FREESTYLE HERI 2 READER) DAMIR,         Continuous Blood Gluc Sensor (FREESTYLE HERI 2        SENSOR) MISC, Comprehensive metabolic panel         (BMP + Alb, Alk Phos, ALT, AST, Total. Bili,         TP), AMB Adult Diabetes Educator Referral,         metFORMIN (GLUCOPHAGE) 500 MG tablet, FOOT EXAM        Continue metformin.  Order for testing supplies sent - monitoring of blood sugar planned with dietary adjustments as needed.  Referral to diabetic education.    (I10) Essential hypertension  Comment: controlled.  Plan: amLODIPine (NORVASC) 10 MG tablet, metoprolol         tartrate (LOPRESSOR) 25 MG tablet        Continue current treatment.     (E78.5) Hyperlipidemia, unspecified hyperlipidemia type  Comment: lipids controlled.  Plan: atorvastatin (LIPITOR) 40 MG tablet        Refill sent.     (F17.200) Tobacco dependence  Comment: declines assistance for smoking cessation.  Plan: encourage  smoking cessation.    (Z12.11) Colon cancer screening  Comment:   Plan: Colonoscopy Screening  Referral            (Z13.6) Encounter for abdominal aortic aneurysm (AAA) screening  Comment: screening recommended.  Plan: US Aorta Medicare AAA Screening            (C67.9) Malignant neoplasm of urinary bladder, unspecified site (H)  Comment:   Plan: ongoing yearly care with Dr. Tejeda - due this month.    (S06.9XAS) Traumatic brain injury, with unknown loss of consciousness status, sequela (H)  Comment:   Plan: longstanding side effect of previous injury.  Has assistance with ILS worker and home support.      (F14.10) Cocaine substance abuse (H)  (F14.11) Cocaine abuse, in remission (H)  Comment: past use -   Plan: Drug Abuse Screen Panel 13, Urine (Pain Care         Map) - lab collect        Drug screening today.      Patient has been advised of split billing requirements and indicates understanding: Yes      COUNSELING:  Reviewed preventive health counseling, as reflected in patient instructions       Consider AAA screening for ages 65-75 and smoking history       Regular exercise       Healthy diet/nutrition       Vision screening       Hearing screening       Dental care       Bladder control       Fall risk prevention       Immunizations  Declined: Covid-19, Pneumococcal, and Zoster due to Concerns about side effects/safety             Colon cancer screening        He reports that he has been smoking cigarettes. He has been smoking an average of .5 packs per day. He has never used smokeless tobacco.  Nicotine/Tobacco Cessation Plan:   Information offered: Patient not interested at this time      Appropriate preventive services were discussed with this patient, including applicable screening as appropriate for cardiovascular disease, diabetes, osteopenia/osteoporosis, and glaucoma.  As appropriate for age/gender, discussed screening for colorectal cancer, prostate cancer, breast cancer, and cervical cancer.  Checklist reviewing preventive services available has been given to the patient.    Reviewed patients plan of care and provided an AVS. The Basic Care Plan (routine screening as documented in Health Maintenance) for Faheem meets the Care Plan requirement. This Care Plan has been established and reviewed with the Patient.          Dinorah Correa MD  [facility]    Identified Health Risks:  I have reviewed Opioid Use Disorder and Substance Use Disorder risk factors and made any needed referrals.         Patient Instructions   Lab today  Refills will be sent when results return.  Follow up with eye doctor recommended in September.    Cardiology and sleep clinic follow up is recommended.      Ultrasound of the abdomen recommended to screening for aneurysm.  Please call [address] [place] [address] to set up this appointment.    I have sent in a script for the freestyle testing supplies for blood sugar.  Referral to diabetic education regarding use of this and other diabetic concerns.    Referral for colon cancer screening - someone will call to set up that appointment.      Patient Education  Personalized Prevention Plan  You are due for the preventive services outlined below.  Your care team is available to assist you in scheduling these services.  If you have already completed any of these items, please share that information with your care team to update in your medical record.  Health Maintenance Due   Topic Date Due     Pneumococcal Vaccine (1 - PCV) Never done     Colorectal Cancer Screening  Never done     LUNG CANCER SCREENING  Never done     Zoster (Shingles) Vaccine (2 of 3) 11/26/2013     AORTIC ANEURYSM SCREENING (SYSTEM ASSIGNED)  Never done     URINE DRUG SCREEN  03/11/2020     Annual Wellness Visit  01/08/2021     Diabetic Foot Exam  01/08/2021     Eye Exam  01/01/2022     COVID-19 Vaccine (5 - Pfizer series) 07/22/2022     A1C Lab  08/21/2022     Basic Metabolic Panel  02/21/2023      Cholesterol Lab  02/21/2023     Kidney Microalbumin Urine Test  02/21/2023

## 2023-08-18 LAB
ALBUMIN SERPL BCG-MCNC: 4.2 G/DL (ref 3.5–5.2)
ALP SERPL-CCNC: 93 U/L (ref 40–129)
ALT SERPL W P-5'-P-CCNC: 42 U/L (ref 0–70)
ANION GAP SERPL CALCULATED.3IONS-SCNC: 12 MMOL/L (ref 7–15)
AST SERPL W P-5'-P-CCNC: 38 U/L (ref 0–45)
BILIRUB SERPL-MCNC: 0.2 MG/DL
BUN SERPL-MCNC: 26.7 MG/DL (ref 8–23)
CALCIUM SERPL-MCNC: 9.9 MG/DL (ref 8.8–10.2)
CHLORIDE SERPL-SCNC: 101 MMOL/L (ref 98–107)
CHOLEST SERPL-MCNC: 165 MG/DL
CREAT SERPL-MCNC: 1 MG/DL (ref 0.67–1.17)
CREAT UR-MCNC: 109 MG/DL
DEPRECATED HCO3 PLAS-SCNC: 26 MMOL/L (ref 22–29)
GFR SERPL CREATININE-BSD FRML MDRD: 81 ML/MIN/1.73M2
GLUCOSE SERPL-MCNC: 140 MG/DL (ref 70–99)
HDLC SERPL-MCNC: 47 MG/DL
LDLC SERPL CALC-MCNC: 99 MG/DL
MICROALBUMIN UR-MCNC: 15.8 MG/L
MICROALBUMIN/CREAT UR: 14.5 MG/G CR (ref 0–17)
NONHDLC SERPL-MCNC: 118 MG/DL
POTASSIUM SERPL-SCNC: 4.4 MMOL/L (ref 3.4–5.3)
PROT SERPL-MCNC: 7.2 G/DL (ref 6.4–8.3)
SODIUM SERPL-SCNC: 139 MMOL/L (ref 136–145)
TRIGL SERPL-MCNC: 97 MG/DL

## 2023-08-19 PROBLEM — C67.9 MALIGNANT NEOPLASM OF URINARY BLADDER, UNSPECIFIED SITE (H): Status: ACTIVE | Noted: 2023-08-19

## 2023-08-19 PROBLEM — F14.10 COCAINE SUBSTANCE ABUSE (H): Status: ACTIVE | Noted: 2023-08-19

## 2023-08-19 RX ORDER — ATORVASTATIN CALCIUM 40 MG/1
40 TABLET, FILM COATED ORAL DAILY
Qty: 90 TABLET | Refills: 3 | Status: SHIPPED | OUTPATIENT
Start: 2023-08-19

## 2023-08-19 RX ORDER — METOPROLOL TARTRATE 25 MG/1
12.5 TABLET, FILM COATED ORAL 2 TIMES DAILY
Qty: 90 TABLET | Refills: 1 | Status: SHIPPED | OUTPATIENT
Start: 2023-08-19

## 2023-08-19 RX ORDER — AMLODIPINE BESYLATE 10 MG/1
10 TABLET ORAL DAILY
Qty: 90 TABLET | Refills: 1 | Status: SHIPPED | OUTPATIENT
Start: 2023-08-19 | End: 2024-07-15

## 2023-08-19 NOTE — RESULT ENCOUNTER NOTE
Your kidney and liver testing are both normal.  Long-term blood sugar testing is higher than previous.  It is in the acceptable range but I would recommend that you increase the frequency with which you are monitoring your blood sugars and make appropriate dietary changes to assist with control.  Continuing your current metformin dose along with as needed insulin based on blood sugars as recommended.  Your urine testing is normal.  There is not elevated protein present.  Your cholesterol levels are under good control and I would recommend you continue your current atorvastatin use.  Please call or MyChart message me if you have any questions.      TALIBK

## 2023-09-22 ENCOUNTER — HOSPITAL ENCOUNTER (OUTPATIENT)
Facility: CLINIC | Age: 68
End: 2023-09-22
Attending: COLON & RECTAL SURGERY | Admitting: COLON & RECTAL SURGERY
Payer: COMMERCIAL

## 2023-09-22 ENCOUNTER — TELEPHONE (OUTPATIENT)
Dept: GASTROENTEROLOGY | Facility: CLINIC | Age: 68
End: 2023-09-22
Payer: COMMERCIAL

## 2023-09-22 DIAGNOSIS — Z12.11 ENCOUNTER FOR SCREENING COLONOSCOPY: Primary | ICD-10-CM

## 2023-09-22 NOTE — TELEPHONE ENCOUNTER
"Endoscopy Scheduling Screen    Have you had a positive Covid test in the last 14 days?  No    Are you active on MyChart?   Yes    What insurance is in the chart?  Other:  [facility]    Ordering/Referring Provider: AISHWARYA ORTEZ   (If ordering provider performs procedure, schedule with ordering provider unless otherwise instructed. )    BMI: Estimated body mass index is 23.94 kg/m  as calculated from the following:    Height as of 8/17/23: 1.613 m (5' 3.5\").    Weight as of 8/17/23: 62.3 kg (137 lb 4.8 oz).     Sedation Ordered  moderate sedation.   If patient BMI > 50 do not schedule in [facility].    If patient BMI > 45 do not schedule at [facility].    Are you taking methadone or Suboxone?  No    Are you taking any prescription medications for pain 3 or more times per week?   No    Do you have a history of malignant hyperthermia or adverse reaction to anesthesia?  No    (Females) Are you currently pregnant?   No     Have you been diagnosed or told you have pulmonary hypertension?   No    Do you have an LVAD?  No    Have you been told you have moderate to severe sleep apnea?  Yes (RN Review required for scheduling unless scheduling in Hospital.)    Have you been told you have COPD, asthma, or any other lung disease?  No    Do you have any heart conditions?  Yes     In the past 6 months, have you had any hospitalizations for heart related issues including cardiomyopathy, heart attack, or stent placement?  No    Do you have any implantable devices in your body (pacemaker, ICD)?  No    Do you take nitroglycerine?  Yes, less than 1 time per week    Have you ever had an organ transplant?   No    Have you ever had or are you awaiting a heart or lung transplant?   No    Have you had a stroke or transient ischemic attack (TIA aka \"mini stroke\" in the last 6 months?   No    Have you been diagnosed with or been told you have cirrhosis of the liver?   No    Are you currently on dialysis?   No    Do you need assistance " "transferring?   No    BMI: Estimated body mass index is 23.94 kg/m  as calculated from the following:    Height as of 8/17/23: 1.613 m (5' 3.5\").    Weight as of 8/17/23: 62.3 kg (137 lb 4.8 oz).     Is patients BMI > 40 and scheduling location [facility]?  No    Do you take an injectable medication for weight loss or diabetes (excluding insulin)?  No    Do you take the medication Naltrexone?  No    Do you take blood thinners?  No       Prep   Are you currently on dialysis or do you have chronic kidney disease?  No    Do you have a diagnosis of diabetes?  Yes (Golytely Prep)    Do you have a diagnosis of cystic fibrosis (CF)?  No    On a regular basis do you go 3 -5 days between bowel movements?  No    BMI > 40?  No    Preferred Pharmacy:    [id number] - [place], [place] - [address] AT [facility] [address]  [address]  [facility] [address]  Phone: [phone] Fax: [phone]      Final Scheduling Details   Colonoscopy prep sent?  Standard Golytely    Procedure scheduled  Colonoscopy    Surgeon:  DANIEL     Date of procedure:  11/16/23     Pre-OP / PAC:   No - Not required for this site.    Location  [facility] - Per order.    Sedation   Moderate Sedation - Per order.      Patient Reminders:   You will receive a call from a Nurse to review instructions and health history.  This assessment must be completed prior to your procedure.  Failure to complete the Nurse assessment may result in the procedure being cancelled.      On the day of your procedure, please designate an adult(s) who can drive you home stay with you for the next 24 hours. The medicines used in the exam will make you sleepy. You will not be able to drive.      You cannot take public transportation, ride share services, or non-medical taxi service without a responsible caregiver.  Medical transport services are allowed with the requirement that a responsible caregiver will receive you at your destination.  We " require that drivers and caregivers are confirmed prior to your procedure.

## 2023-09-29 RX ORDER — BISACODYL 5 MG/1
TABLET, DELAYED RELEASE ORAL
Qty: 4 TABLET | Refills: 0 | Status: SHIPPED | OUTPATIENT
Start: 2023-09-29 | End: 2023-11-16 | Stop reason: HOSPADM

## 2023-11-08 ENCOUNTER — TELEPHONE (OUTPATIENT)
Dept: FAMILY MEDICINE | Facility: CLINIC | Age: 68
End: 2023-11-08
Payer: COMMERCIAL

## 2023-11-08 DIAGNOSIS — E11.9 TYPE 2 DIABETES MELLITUS WITHOUT COMPLICATION, WITH LONG-TERM CURRENT USE OF INSULIN (H): ICD-10-CM

## 2023-11-08 DIAGNOSIS — Z79.4 TYPE 2 DIABETES MELLITUS WITHOUT COMPLICATION, WITH LONG-TERM CURRENT USE OF INSULIN (H): ICD-10-CM

## 2023-11-08 NOTE — TELEPHONE ENCOUNTER
Jessy calling from [facility] requesting prescription for Metformin to be sent as a 90 day supply as it is easier for patient to get to the pharmacy and  a 1 time 90 day supply rather than going every month to  prescription     Prescription resent for a 90 day supply    Sarah SCHMITTN, RN

## 2023-11-15 RX ORDER — LIDOCAINE 40 MG/G
CREAM TOPICAL
Status: CANCELLED | OUTPATIENT
Start: 2023-11-15

## 2023-11-15 RX ORDER — ONDANSETRON 2 MG/ML
4 INJECTION INTRAMUSCULAR; INTRAVENOUS
Status: CANCELLED | OUTPATIENT
Start: 2023-11-15

## 2023-12-12 ENCOUNTER — TELEPHONE (OUTPATIENT)
Dept: AUDIOLOGY | Facility: CLINIC | Age: 68
End: 2023-12-12
Payer: COMMERCIAL

## 2023-12-12 NOTE — TELEPHONE ENCOUNTER
[facility] Call Center    Phone Message    May a detailed message be left on voicemail: yes     Reason for Call: Other: Pt called in regards to his hearing aids. He has lost his left hearing aid. The left one is connected to the right one, so he's unable to use his hearing aids at all. Pt is wondering if Bib is able to reorder them or him or if he will need an appt. Please call pt back to discuss the different options he has.     Action Taken: Other: FK Audio    Travel Screening: Not Applicable

## 2023-12-13 NOTE — TELEPHONE ENCOUNTER
I informed the patient that his left ear hearing aid was already replaced 1 time through his insurance and they will not cover a 2nd replacement.     -Bib Mccall CCC-A

## 2024-01-30 ENCOUNTER — TELEPHONE (OUTPATIENT)
Dept: AUDIOLOGY | Facility: CLINIC | Age: 69
End: 2024-01-30
Payer: COMMERCIAL

## 2024-01-30 NOTE — TELEPHONE ENCOUNTER
[facility] Call Center    Phone Message    May a detailed message be left on voicemail: yes     Reason for Call: Other: Pt lost hearing aid and would like a call to discuss his options to get a new one. Would like a call between 2-5pm. Thanks. FK      Action Taken: Other: AUDIO    Travel Screening: Not Applicable

## 2024-02-02 NOTE — TELEPHONE ENCOUNTER
[facility] Call Center    Phone Message    May a detailed message be left on voicemail: yes     Reason for Call: Other: Pt calling to check the status of this. Please call to advise. Thanks.     Action Taken: Other: AUDIO    Travel Screening: Not Applicable

## 2024-02-02 NOTE — TELEPHONE ENCOUNTER
Patient reports [employer] MAY cover a 2nd replacement of the hearing aid. He recommends we talk to his ILS worker who will be there Tuesday at 2. Will attempt contact then.    -iBb Mccall CCC-A

## 2024-02-08 NOTE — PROGRESS NOTES
AUDIOLOGY REPORT    SUBJECTIVE:  Faheem Jeff is a 68 year old male who was seen in the Audiology Clinic at the [facility] for audiologic evaluation, referred by Zack Rogers M.D.. The patient has been seen previously at the [facility] on 1/27/20 for assessment and results indicated a bilateral mixed hearing loss with 84% word recognition in the left ear and 12% word recognition in the right ear. The patient was fit with a left Phonak Virto B70-13 in-the-ear hearing aid with right Phonak CROS B-13 in-the-ear transmitter on 9/22/20 at the [facility]. The patient lost his left hearing aid and new device needed to be prior authorized. The replacement left hearing aid was then fit on 6/12/23. Faheem reports that he has lost his left hearing aid again. He notes that when he hits a bump while using his motorized scooter the hearing aid become loose and falls out. He reports he still has the right device at home, but has not been wearing it since it needs the left side to work. Today, the patient reports hearing seems stable. He denies tinnitus, aural fullness, ear pain, drainage, or ear  surgeries. 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 The patient notes difficulty with communication in a variety of listening situations. They were not accompanied today by anyone.     OBJECTIVE:  Abuse Screening:  Do you feel unsafe at home or work/school? No  Do you feel threatened by someone? No  Does anyone try to keep you from having contact with others, or doing things outside of your home? No  Physical signs of abuse present? No     Fall Risk Screen:  1. Have you fallen two or more times in the past year? No  2. Have you fallen and had an injury in the past year? No    Otoscopic exam indicates ears are clear of cerumen  bilaterally     Pure Tone Thresholds assessed using conventional audiometry with good  reliability from 250-8000 Hz bilaterally using insert earphones and circumaural headphones     RIGHT:  moderate-severe sloping to severe rising to moderately severe sensorineural hearing loss    LEFT:    moderate-severe sensorineural hearing loss    Tympanogram:    RIGHT: restricted eardrum mobility     LEFT:   normal eardrum mobility    Reflexes (reported by stimulus ear):  RIGHT: Ipsilateral is absent at frequencies tested  RIGHT: Contralateral is present at normal levels  LEFT:   Ipsilateral is present at normal levels  LEFT:   Contralateral is absent at frequencies tested      Speech Reception Threshold:    RIGHT: 65 dB HL    LEFT: 55 dB HL  Word Recognition Score:     RIGHT: 20% at 95 dB HL using NU-6 recorded word list.    LEFT: 76% at 95 dB HL using NU-6 recorded word list.    Patient is a hearing aid candidate. Discussed that left device still has one replacement. New device will be ordered via loss and damage. It was decided to take a new earmold impression given patient's report of poor fit. Also discussed possibility of adding hook for retention lock. Patient is in agreement and this will be requested. A left earmold was taken without incident.    ASSESSMENT: Today's results indicate a bilateral sensorineural hearing loss. Compared to patient's previous audiogram dated 1/27/20, right air conduction improved 10-35 dB from 4-8 kHz, left air conduction worsened at 15 dB at 6 kHz, and bilateral bone conduction worsened 15 dB at .5 kHz (air bone gap no longer present). Word recognition scores are stable bilaterally.     PLAN:  Patient was counseled regarding hearing loss and impact on communication. Faheem is scheduled to return in a few weeks for a hearing aid fitting and programming. He will bring his right device to this appointment to be reconnected. Please call this clinic with questions regarding these results or  recommendations.        Lilian Romero CCC-A  Licensed Audiologist   [place] #[id number]

## 2024-02-09 ENCOUNTER — OFFICE VISIT (OUTPATIENT)
Dept: AUDIOLOGY | Facility: CLINIC | Age: 69
End: 2024-02-09
Payer: COMMERCIAL

## 2024-02-09 DIAGNOSIS — H90.3 SNHL (SENSORY-NEURAL HEARING LOSS), ASYMMETRICAL: Primary | ICD-10-CM

## 2024-02-09 DIAGNOSIS — H90.3 SNHL (SENSORY-NEURAL HEARING LOSS), ASYMMETRICAL: ICD-10-CM

## 2024-02-09 DIAGNOSIS — H90.6 MIXED HEARING LOSS, BILATERAL: Primary | ICD-10-CM

## 2024-02-09 PROCEDURE — 92557 COMPREHENSIVE HEARING TEST: CPT | Performed by: AUDIOLOGIST

## 2024-02-09 PROCEDURE — V5275 EAR IMPRESSION: HCPCS | Mod: LT | Performed by: AUDIOLOGIST

## 2024-02-09 PROCEDURE — 92550 TYMPANOMETRY & REFLEX THRESH: CPT | Performed by: AUDIOLOGIST

## 2024-02-14 DIAGNOSIS — E11.9 TYPE 2 DIABETES MELLITUS WITHOUT COMPLICATION, WITH LONG-TERM CURRENT USE OF INSULIN (H): ICD-10-CM

## 2024-02-14 DIAGNOSIS — Z79.4 TYPE 2 DIABETES MELLITUS WITHOUT COMPLICATION, WITH LONG-TERM CURRENT USE OF INSULIN (H): ICD-10-CM

## 2024-03-15 ENCOUNTER — OFFICE VISIT (OUTPATIENT)
Dept: AUDIOLOGY | Facility: CLINIC | Age: 69
End: 2024-03-15
Payer: COMMERCIAL

## 2024-03-15 DIAGNOSIS — H90.6 MIXED HEARING LOSS, BILATERAL: Primary | ICD-10-CM

## 2024-03-15 NOTE — PROGRESS NOTES
AUDIOLOGY REPORT    SUBJECTIVE:Faheem Jeff is a 68 year old male who was seen in the Audiology Clinic at the [facility] on 3/15/2024  for a fitting of a loss and damage of his left hearing aid. The patient has been seen previously at the [facility] on 1/27/20 for assessment and results indicated a bilateral mixed hearing loss with 84% word recognition in the left ear and 12% word recognition in the right ear. The patient was fit with a left Phonak Virto B70-13 in-the-ear hearing aid with right Phonak CROS B-13 in-the-ear transmitter on 9/22/20 at the [facility]. The patient lost his left hearing aid and new device needed to be prior authorized. The replacement left hearing aid was then fit on 6/12/23. Faheem then lost his left hearing aid again.     Faheem reports that he could not find his right ear CROS transmitter. He is confident it is in his house, but due to his TBI he cannot remember where it is located.     OBJECTIVE:   Left L & D was fit today. A jewelry clip was added to the device so a retention line could be used with the hearing aid. Care and use of how to use the retention line was reviewed in detail. Faheem reports good sound quality with the aid. Hearing aid conformity test is ran prior to today's appointment.       ASSESSMENT: A fitting of left L & D hearing aid was completed today. Changes to hearing aid was completed as outlined above.     PLAN:Faheem will return for follow-up as needed. Instruction on how to use the retention line were printed out for him. He is advised to return if he finds the right CROS transmitter in order to pair to the hearing aid.Please call this clinic with any questions regarding today s appointment.        Lilian Bautista, [facility]-A  Licensed Audiologist  [place] #[id number]

## 2024-03-15 NOTE — PATIENT INSTRUCTIONS
Hearing aid Retention Line. Clip the alligator looking clip to your shirt every morning. Unclip it at night when you go to bed. Keep the [place] looking clip on the hearing aid always.     If you find the right hearing aid, make an appointment so we can pair the left and right hearing aid together.

## 2024-03-25 ENCOUNTER — TELEPHONE (OUTPATIENT)
Dept: AUDIOLOGY | Facility: CLINIC | Age: 69
End: 2024-03-25
Payer: COMMERCIAL

## 2024-03-25 NOTE — TELEPHONE ENCOUNTER
Sent the following [employer] message:     Cash Mayen,     Dr. Arrington is currently out of the office, but I saw your message about hearing aid supplies. You are able to get the supplies through us at no cost, just let us know what you need, and you can either pick it up at your convenience or we can mail it.     Thanks,  [facility]    Phone Message    May a detailed message be left on voicemail: yes     Reason for Call: Other: Pt called wondering if he is able to get his hearing aid supplies through his insurance or if he will be charged through [facility]. He is also still looking for his ear piece, but hasn't found it yet.      Action Taken: Other: [facility]    Travel Screening: Not Applicable

## 2024-04-05 ENCOUNTER — TELEPHONE (OUTPATIENT)
Dept: AUDIOLOGY | Facility: CLINIC | Age: 69
End: 2024-04-05
Payer: COMMERCIAL

## 2024-04-05 NOTE — TELEPHONE ENCOUNTER
Health Call Center    Phone Message    May a detailed message be left on voicemail: yes     Reason for Call: Other: Pt has some questions regarding his hearing aids and [employer], please reach out to him to answer questions, thanks     Action Taken: Other: AUDIO    Travel Screening: Not Applicable     Called patient back.. Requests batteries and wax filters be mailed to him. This will be completed.    Lilian Bautista, [facility]-A  Licensed Audiologist  [place] #[id number]

## 2024-04-07 ENCOUNTER — HEALTH MAINTENANCE LETTER (OUTPATIENT)
Age: 69
End: 2024-04-07

## 2024-04-23 ENCOUNTER — DOCUMENTATION ONLY (OUTPATIENT)
Dept: AUDIOLOGY | Facility: CLINIC | Age: 69
End: 2024-04-23
Payer: COMMERCIAL

## 2024-04-23 DIAGNOSIS — H90.6 MIXED HEARING LOSS, BILATERAL: Primary | ICD-10-CM

## 2024-04-23 PROCEDURE — V5266 BATTERY FOR HEARING DEVICE: HCPCS | Mod: NU

## 2024-06-25 ENCOUNTER — TELEPHONE (OUTPATIENT)
Dept: NURSING | Facility: CLINIC | Age: 69
End: 2024-06-25
Payer: COMMERCIAL

## 2024-06-25 ENCOUNTER — TELEPHONE (OUTPATIENT)
Dept: FAMILY MEDICINE | Facility: CLINIC | Age: 69
End: 2024-06-25
Payer: COMMERCIAL

## 2024-06-25 DIAGNOSIS — Z79.4 TYPE 2 DIABETES MELLITUS WITHOUT COMPLICATION, WITH LONG-TERM CURRENT USE OF INSULIN (H): ICD-10-CM

## 2024-06-25 DIAGNOSIS — E11.9 TYPE 2 DIABETES MELLITUS WITHOUT COMPLICATION, WITH LONG-TERM CURRENT USE OF INSULIN (H): ICD-10-CM

## 2024-06-25 NOTE — TELEPHONE ENCOUNTER
Refill request:     Pt calling for a refill of his Nitrostat.     Writer is routing this refill request to the clinic to process his refill request.     Melissa Danielson RN   Triage Nurse Advisor on 6/25/2024 at 6:32 PM

## 2024-06-26 RX ORDER — NITROGLYCERIN 0.4 MG/1
TABLET SUBLINGUAL
Qty: 25 TABLET | Refills: 0 | OUTPATIENT
Start: 2024-06-26

## 2024-06-26 NOTE — TELEPHONE ENCOUNTER
Last visit more than 6 months ago  No future appointment on the books- assist with scheduling video visit with Dr. Correa   Given one month

## 2024-07-01 DIAGNOSIS — R07.9 CHEST PAIN, UNSPECIFIED TYPE: Primary | ICD-10-CM

## 2024-07-01 RX ORDER — NITROGLYCERIN 0.4 MG/1
TABLET SUBLINGUAL
Qty: 25 TABLET | OUTPATIENT
Start: 2024-07-01

## 2024-07-01 RX ORDER — NITROGLYCERIN 0.4 MG/1
TABLET SUBLINGUAL
Qty: 25 TABLET | Refills: 0 | Status: SHIPPED | OUTPATIENT
Start: 2024-07-01

## 2024-07-03 DIAGNOSIS — R07.9 CHEST PAIN, UNSPECIFIED TYPE: ICD-10-CM

## 2024-07-03 RX ORDER — NITROGLYCERIN 0.4 MG/1
TABLET SUBLINGUAL
Qty: 25 TABLET | Refills: 0 | OUTPATIENT
Start: 2024-07-03

## 2024-07-13 DIAGNOSIS — I10 ESSENTIAL HYPERTENSION: ICD-10-CM

## 2024-07-15 RX ORDER — AMLODIPINE BESYLATE 10 MG/1
10 TABLET ORAL DAILY
Qty: 90 TABLET | Refills: 1 | Status: SHIPPED | OUTPATIENT
Start: 2024-07-15

## 2024-07-22 DIAGNOSIS — E11.9 TYPE 2 DIABETES MELLITUS WITHOUT COMPLICATION, WITH LONG-TERM CURRENT USE OF INSULIN (H): ICD-10-CM

## 2024-07-22 DIAGNOSIS — Z79.4 TYPE 2 DIABETES MELLITUS WITHOUT COMPLICATION, WITH LONG-TERM CURRENT USE OF INSULIN (H): ICD-10-CM

## 2024-08-19 ENCOUNTER — TELEPHONE (OUTPATIENT)
Dept: AUDIOLOGY | Facility: CLINIC | Age: 69
End: 2024-08-19
Payer: COMMERCIAL

## 2024-08-19 NOTE — TELEPHONE ENCOUNTER
Patient confirmed scheduled appointment:  Date: 9/12  Time: 3:30pm  Visit type: Hearing Aid Check  Provider: Ramila Reynaga  Location: [facility]  Testing/imaging:   Additional notes:

## 2024-09-12 ENCOUNTER — OFFICE VISIT (OUTPATIENT)
Dept: AUDIOLOGY | Facility: CLINIC | Age: 69
End: 2024-09-12
Payer: COMMERCIAL

## 2024-09-12 DIAGNOSIS — H90.3 SENSORINEURAL HEARING LOSS, BILATERAL: Primary | ICD-10-CM

## 2024-09-12 NOTE — PROGRESS NOTES
AUDIOLOGY REPORT    SUBJECTIVE: Faheem Jeff is a 69 year old male who was seen in the Audiology Clinic at [facility] on 9/12/2024 for a hearing aid check. Previous results have revealed moderate to severe sensorineural hearing loss with 20% word recognition for the right ear and moderate to moderately severe sensorineural hearing loss with 76% word recognition for the left ear. The patient has been seen previously in this clinic and was fit with a left Phonak Virto B70-13 hearing aid and Phonak CROS B-13 transmitter on 9/22/2020. The left hearing aid was replaced on 6/12/2023 and then again on 3/15/2024. Today, Faheem reports the left hearing aid is not working well. He did not bring the right CROS transmitter today.    OBJECTIVE: The left hearing aid was deep cleaned. A listening check indicated it sounded extremely weak. It will be sent for repair.    ASSESSMENT: A hearing aid check was completed today.    PLAN: Faheem will be contacted when the left hearing aid is back from repair. Please call this clinic with any questions regarding today s appointment.      Lilian Sumner, [facility]-A  Licensed Audiologist  [place] #[id number]

## 2024-09-26 ENCOUNTER — TELEPHONE (OUTPATIENT)
Dept: FAMILY MEDICINE | Facility: CLINIC | Age: 69
End: 2024-09-26
Payer: COMMERCIAL

## 2024-09-26 DIAGNOSIS — Z79.4 TYPE 2 DIABETES MELLITUS WITHOUT COMPLICATION, WITH LONG-TERM CURRENT USE OF INSULIN (H): ICD-10-CM

## 2024-09-26 DIAGNOSIS — E11.9 TYPE 2 DIABETES MELLITUS WITHOUT COMPLICATION, WITH LONG-TERM CURRENT USE OF INSULIN (H): ICD-10-CM

## 2024-09-26 NOTE — TELEPHONE ENCOUNTER
This is more than 30 days  from his surgery. May need to be rescheduled to a later date.   I will send in script to get to appointment.      MARIE

## 2024-09-26 NOTE — TELEPHONE ENCOUNTER
Reason for Call:  Appointment Request    Patient requesting this type of appt: Pre-op    Requested provider: Dinorah Correa    Reason patient unable to be scheduled: Not within requested timeframe    When does patient want to be seen/preferred time: 3-7 days    Comments: Patient is having lasik eye surgery on 11/19 at the [facility] with Mu Mccracken, he would also like to do a medication check with PCP so he can get refills for his Metformin. He's worried that he won't get his refills.     Could we send this information to you in [employer] or would you prefer to receive a phone call?:   Patient would prefer a phone call   Okay to leave a detailed message?: Yes at Home number on file [phone] (home)    Call taken on 9/26/2024 at 1:19 PM by Claudia Smith

## 2024-09-26 NOTE — TELEPHONE ENCOUNTER
Writer called pt to get clarification on when he needs the pre-op by. Pt couldn't hear and stated his hearing aid wasn't working. He will give us a call back in a little bit.

## 2024-09-26 NOTE — TELEPHONE ENCOUNTER
Appt scheduled for 10/17. Pt is requesting provider sent in a javier refill until visit. He is running out and that was the first 40 mins writer could find.     Routing to provider to review and advise.

## 2024-09-26 NOTE — TELEPHONE ENCOUNTER
Ok to use same day for preop in the appropriate time prior to surgery - we will do the medication check at the same time.     MARIE

## 2024-09-27 ENCOUNTER — TELEPHONE (OUTPATIENT)
Dept: AUDIOLOGY | Facility: CLINIC | Age: 69
End: 2024-09-27
Payer: COMMERCIAL

## 2024-09-27 NOTE — TELEPHONE ENCOUNTER
LVM that hearing aids are back from repair and available to be picked up at the Audiology Clinic (M-F, 7AM-6PM). We can also mail them to him if that's preferable.       Gave audiology number

## 2024-10-02 DIAGNOSIS — Z79.4 TYPE 2 DIABETES MELLITUS WITHOUT COMPLICATION, WITH LONG-TERM CURRENT USE OF INSULIN (H): ICD-10-CM

## 2024-10-02 DIAGNOSIS — E11.9 TYPE 2 DIABETES MELLITUS WITHOUT COMPLICATION, WITH LONG-TERM CURRENT USE OF INSULIN (H): ICD-10-CM

## 2024-10-05 NOTE — TELEPHONE ENCOUNTER
Has follow up appointment for end of the month.  Should have adequate supply to get to that appointment.    MARIE

## 2024-10-29 DIAGNOSIS — R07.9 CHEST PAIN, UNSPECIFIED TYPE: ICD-10-CM

## 2024-10-29 DIAGNOSIS — E11.9 TYPE 2 DIABETES MELLITUS WITHOUT COMPLICATION, WITH LONG-TERM CURRENT USE OF INSULIN (H): ICD-10-CM

## 2024-10-29 DIAGNOSIS — Z79.4 TYPE 2 DIABETES MELLITUS WITHOUT COMPLICATION, WITH LONG-TERM CURRENT USE OF INSULIN (H): ICD-10-CM

## 2024-10-29 DIAGNOSIS — I10 ESSENTIAL HYPERTENSION: ICD-10-CM

## 2024-10-29 RX ORDER — AMLODIPINE BESYLATE 10 MG/1
10 TABLET ORAL DAILY
Qty: 90 TABLET | Refills: 1 | OUTPATIENT
Start: 2024-10-29

## 2024-10-29 NOTE — TELEPHONE ENCOUNTER
Medication Question or Refill        What medication are you calling about (include dose and sig)?: Metformin, Amlodipine, NItroglycerin    Preferred Pharmacy:  [id number] - [place], [place] - [address] AT [facility] [address]  [address]  [facility] [address]  Phone: [phone] Fax: [phone]      Controlled Substance Agreement on file:   CSA -- Patient Level:    CSA: None found at the patient level.       Who prescribed the medication?: Dinorah Correa     Do you need a refill? Yes    When did you use the medication last? ASAP    Patient offered an appointment? Yes: appt scheduled for Nov      Could we send this information to you in [employer] or would you prefer to receive a phone call?:   Patient would prefer a phone call   Okay to leave a detailed message?: Yes at Cell number on file:    Telephone Information:   Mobile [phone]

## 2024-10-30 RX ORDER — NITROGLYCERIN 0.4 MG/1
TABLET SUBLINGUAL
Qty: 25 TABLET | Refills: 0 | Status: SHIPPED | OUTPATIENT
Start: 2024-10-30

## 2024-11-03 ENCOUNTER — HEALTH MAINTENANCE LETTER (OUTPATIENT)
Age: 69
End: 2024-11-03

## 2024-11-03 ENCOUNTER — NURSE TRIAGE (OUTPATIENT)
Dept: NURSING | Facility: CLINIC | Age: 69
End: 2024-11-03
Payer: COMMERCIAL

## 2024-11-03 NOTE — TELEPHONE ENCOUNTER
Patient calling with questions about Covid vaccine. Patient got the latest vaccine two days ago and did not feel well afterwards. Explained that the immune system is responding to the vaccine which can cause some temporary symptoms like fatigue, headache, body aches, chills. Patient states he had all that but it has resolved now.   Patient also having constipation. Patient is disabled but can feel some motility. This morning he passed a couple of hard balls of smaller nuggets of hard stool. Patient has treated with X-Lax. Patient took two 15 mg and then the next morning took one more. Patient also having on and off abdominal pain and passing some liquid stool.   Denies constant pain, blood in stool, fever  Protocol recommends to see PCP within 3 days.  Care advice given. Patient transferred to [facility].  Cary Chavez RN   11/03/24 10:05 AM  [facility] Nurse Advisor            Reason for Disposition   Unable to have a bowel movement (BM) without laxative or enema    Additional Information   Negative: Difficulty breathing or swallowing and starts within 2 hours after injection   Negative: Sounds like a life-threatening emergency to the triager   Negative: [1] Abdomen pain is main symptom AND [2] male   Negative: Rectal bleeding or blood in stool is main symptom   Negative: Rectal pain or itching is main symptom   Negative: Constipation in a cancer patient who is currently (or recently) receiving chemotherapy or radiation therapy, or cancer patient who has metastatic or end-stage cancer and is receiving palliative care   Negative: [1] Abdomen pain is main symptom AND [2] adult female   Negative: [1] Vomiting AND [2] contains bile (green color)   Negative: Patient sounds very sick or weak to the triager   Negative: [1] Vomiting AND [2] abdomen looks much more swollen than usual   Negative: [1] Constant abdominal pain AND [2] present > 2 hours   Negative: [1] Rectal pain or fullness from fecal impaction (rectum  full of stool) AND [2] NOT better after SITZ bath, suppository or enema   Negative: [1] Intermittent mild abdominal pain AND [2] fever   Negative: Abdomen is more swollen than usual   Negative: Last bowel movement (BM) > 4 days ago   Negative: Leaking stool   Negative: Unable to have a bowel movement (BM) without manually removing stool (using finger to pull out stool or perform disimpaction)    Protocols used: Coronavirus (COVID-19) Vaccine Questions and [id number], Constipation-A-AH

## 2024-11-07 ENCOUNTER — OFFICE VISIT (OUTPATIENT)
Dept: INTERNAL MEDICINE | Facility: CLINIC | Age: 69
End: 2024-11-07
Payer: COMMERCIAL

## 2024-11-07 VITALS
HEIGHT: 65 IN | SYSTOLIC BLOOD PRESSURE: 140 MMHG | DIASTOLIC BLOOD PRESSURE: 64 MMHG | OXYGEN SATURATION: 99 % | WEIGHT: 137.3 LBS | TEMPERATURE: 98 F | BODY MASS INDEX: 22.88 KG/M2 | HEART RATE: 80 BPM

## 2024-11-07 DIAGNOSIS — Z01.818 PREOP GENERAL PHYSICAL EXAM: Primary | ICD-10-CM

## 2024-11-07 DIAGNOSIS — C67.9 MALIGNANT NEOPLASM OF URINARY BLADDER, UNSPECIFIED SITE (H): ICD-10-CM

## 2024-11-07 DIAGNOSIS — L21.9 SEBORRHEIC DERMATITIS: ICD-10-CM

## 2024-11-07 DIAGNOSIS — Z12.5 ENCOUNTER FOR SCREENING FOR MALIGNANT NEOPLASM OF PROSTATE: ICD-10-CM

## 2024-11-07 DIAGNOSIS — Z79.4 TYPE 2 DIABETES MELLITUS WITHOUT COMPLICATION, WITH LONG-TERM CURRENT USE OF INSULIN (H): ICD-10-CM

## 2024-11-07 DIAGNOSIS — E78.5 HYPERLIPIDEMIA, UNSPECIFIED HYPERLIPIDEMIA TYPE: ICD-10-CM

## 2024-11-07 DIAGNOSIS — E11.9 TYPE 2 DIABETES MELLITUS WITHOUT COMPLICATION, WITH LONG-TERM CURRENT USE OF INSULIN (H): ICD-10-CM

## 2024-11-07 DIAGNOSIS — H90.3 BILATERAL SENSORINEURAL HEARING LOSS: ICD-10-CM

## 2024-11-07 DIAGNOSIS — D64.9 ANEMIA, UNSPECIFIED TYPE: ICD-10-CM

## 2024-11-07 DIAGNOSIS — F14.11 HISTORY OF COCAINE ABUSE (H): ICD-10-CM

## 2024-11-07 DIAGNOSIS — F17.200 TOBACCO DEPENDENCE: ICD-10-CM

## 2024-11-07 DIAGNOSIS — G47.33 OSA (OBSTRUCTIVE SLEEP APNEA): ICD-10-CM

## 2024-11-07 DIAGNOSIS — H11.003 PTERYGIUM OF BOTH EYES: ICD-10-CM

## 2024-11-07 DIAGNOSIS — F10.21 HISTORY OF ALCOHOLISM (H): ICD-10-CM

## 2024-11-07 DIAGNOSIS — R80.9 MICROALBUMINURIA: ICD-10-CM

## 2024-11-07 DIAGNOSIS — I10 ESSENTIAL HYPERTENSION: ICD-10-CM

## 2024-11-07 DIAGNOSIS — G89.4 CHRONIC PAIN DISORDER: ICD-10-CM

## 2024-11-07 DIAGNOSIS — S06.9X9S TRAUMATIC BRAIN INJURY WITH LOSS OF CONSCIOUSNESS, SEQUELA (H): ICD-10-CM

## 2024-11-07 PROBLEM — Z59.00 HOMELESS: Status: RESOLVED | Noted: 2018-08-02 | Resolved: 2024-11-07

## 2024-11-07 LAB
ALBUMIN SERPL BCG-MCNC: 3.6 G/DL (ref 3.5–5.2)
ALP SERPL-CCNC: 93 U/L (ref 40–150)
ALT SERPL W P-5'-P-CCNC: 18 U/L (ref 0–70)
ANION GAP SERPL CALCULATED.3IONS-SCNC: 10 MMOL/L (ref 7–15)
AST SERPL W P-5'-P-CCNC: 22 U/L (ref 0–45)
BILIRUB SERPL-MCNC: <0.2 MG/DL
BUN SERPL-MCNC: 15.5 MG/DL (ref 8–23)
CALCIUM SERPL-MCNC: 9.3 MG/DL (ref 8.8–10.4)
CHLORIDE SERPL-SCNC: 102 MMOL/L (ref 98–107)
CHOLEST SERPL-MCNC: 148 MG/DL
CREAT SERPL-MCNC: 0.93 MG/DL (ref 0.67–1.17)
CREAT UR-MCNC: 60.8 MG/DL
EGFRCR SERPLBLD CKD-EPI 2021: 88 ML/MIN/1.73M2
ERYTHROCYTE [DISTWIDTH] IN BLOOD BY AUTOMATED COUNT: 12.9 % (ref 10–15)
EST. AVERAGE GLUCOSE BLD GHB EST-MCNC: 157 MG/DL
FASTING STATUS PATIENT QL REPORTED: NO
FASTING STATUS PATIENT QL REPORTED: NO
GLUCOSE SERPL-MCNC: 144 MG/DL (ref 70–99)
HBA1C MFR BLD: 7.1 % (ref 0–5.6)
HCO3 SERPL-SCNC: 26 MMOL/L (ref 22–29)
HCT VFR BLD AUTO: 36 % (ref 40–53)
HDLC SERPL-MCNC: 46 MG/DL
HGB BLD-MCNC: 12.1 G/DL (ref 13.3–17.7)
LDLC SERPL CALC-MCNC: 90 MG/DL
MCH RBC QN AUTO: 29.6 PG (ref 26.5–33)
MCHC RBC AUTO-ENTMCNC: 33.6 G/DL (ref 31.5–36.5)
MCV RBC AUTO: 88 FL (ref 78–100)
MICROALBUMIN UR-MCNC: 20.3 MG/L
MICROALBUMIN/CREAT UR: 33.39 MG/G CR (ref 0–17)
NONHDLC SERPL-MCNC: 102 MG/DL
PLATELET # BLD AUTO: 308 10E3/UL (ref 150–450)
POTASSIUM SERPL-SCNC: 4.5 MMOL/L (ref 3.4–5.3)
PROT SERPL-MCNC: 6.7 G/DL (ref 6.4–8.3)
PSA SERPL DL<=0.01 NG/ML-MCNC: 1.35 NG/ML (ref 0–6.5)
RBC # BLD AUTO: 4.09 10E6/UL (ref 4.4–5.9)
SODIUM SERPL-SCNC: 138 MMOL/L (ref 135–145)
TRIGL SERPL-MCNC: 62 MG/DL
WBC # BLD AUTO: 6.2 10E3/UL (ref 4–11)

## 2024-11-07 PROCEDURE — G0103 PSA SCREENING: HCPCS | Performed by: NURSE PRACTITIONER

## 2024-11-07 PROCEDURE — 99214 OFFICE O/P EST MOD 30 MIN: CPT | Performed by: NURSE PRACTITIONER

## 2024-11-07 PROCEDURE — 80053 COMPREHEN METABOLIC PANEL: CPT | Performed by: NURSE PRACTITIONER

## 2024-11-07 PROCEDURE — 85027 COMPLETE CBC AUTOMATED: CPT | Performed by: NURSE PRACTITIONER

## 2024-11-07 PROCEDURE — 83036 HEMOGLOBIN GLYCOSYLATED A1C: CPT | Performed by: NURSE PRACTITIONER

## 2024-11-07 PROCEDURE — 82043 UR ALBUMIN QUANTITATIVE: CPT | Performed by: NURSE PRACTITIONER

## 2024-11-07 PROCEDURE — 36415 COLL VENOUS BLD VENIPUNCTURE: CPT | Performed by: NURSE PRACTITIONER

## 2024-11-07 PROCEDURE — 82570 ASSAY OF URINE CREATININE: CPT | Performed by: NURSE PRACTITIONER

## 2024-11-07 PROCEDURE — 80061 LIPID PANEL: CPT | Performed by: NURSE PRACTITIONER

## 2024-11-07 RX ORDER — KETOCONAZOLE 20 MG/ML
SHAMPOO, SUSPENSION TOPICAL
Qty: 120 ML | Refills: 5 | Status: SHIPPED | OUTPATIENT
Start: 2024-11-07
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
## 2024-11-07 NOTE — PATIENT INSTRUCTIONS
"  PRE-OPERATIVE INSTRUCTIONS:     Contact your surgeon if there is any change in your health. This includes signs of new infection, such as cold or flu (such as a sore throat, runny nose, cough, rash or fever).  Elective surgeries may need to be postponed if there is significant illness present.    Pre-procedure Covid testing is no longer required unless specifically requested by the surgeon or surgical facility.      Stop aspirin (including daily baby aspirin and PeptoBismol, which contains aspirin) for 7 days before procedure because this can affect platelet function.     No NSAIDs (Motrin, Advil, ibuprofen, Aleve, Aspirin, naproxen, Naprosyn, etc) 7 days prior to surgery because these can affect platelet function.      Stop any Fish Oil, herbal supplements, or multivitamin (and specifically anything containing vitamin E) supplements for 14 days prior to surgery because these can affect platelet function.      Tylenol (acetaminophen) is OK to take if needed, this medication has no effect on platelet function.  Follow instructions on the bottle    Follow any preparation instructions as given by the surgeons.  Prepare your body as instructed by the surgery clinic.  If instructed, Take a shower or bath the night before surgery. Use any special soaps or cleaning instructions according to instructions from your surgeon.  If you do not have soap from your surgeon, use your regular soap. Do not shave or scrub the surgery site.  Wear clean pajamas and have clean sheets on your bed     No solid food after midnight    ON THE MORNING OF SURGERY:     - Diabetes Medications: Take metformin on the morning of surgery. Hold Novolog insulin.   - Blood pressure medications: Take metoprolol and amlodipine on the morning of surgery.    Resume all medications at the same doses after surgery (no \"make up\" doses needed), unless instructed otherwise by the medical staff.     Follow all specific postoperative instructions (including any " specific medications) as given by the surgeons    Attend all follow up appointments with the surgeons (and/or therapists if applicable) as instructed.     Contact the surgeon's office for any specific questions about after-surgery cares and follow up instructions.      You do not need to return to see anyone in the primary care clinic after your surgery unless specifically instructed to do so.      If your planned surgery gets re-scheduled to a date that falls outside the 30 day window from the date of this pre-op exam, you do NOT need to return to see us for another pre-op exam.  Depending on the rescheduled date, we can probably still clear you for the surgery with this exam performed today, but I will have to write and addendum from the pre-op exam from today dated within the 30 days of the surgery date.   Please contact me with any changes in the date, time, and place of surgery.

## 2024-11-08 DIAGNOSIS — E78.5 HYPERLIPIDEMIA, UNSPECIFIED HYPERLIPIDEMIA TYPE: ICD-10-CM

## 2024-11-08 PROBLEM — R80.9 MICROALBUMINURIA: Status: ACTIVE | Noted: 2024-11-08

## 2024-11-08 PROBLEM — D64.9 ANEMIA, UNSPECIFIED TYPE: Status: ACTIVE | Noted: 2024-11-08

## 2024-11-08 RX ORDER — ATORVASTATIN CALCIUM 40 MG/1
40 TABLET, FILM COATED ORAL DAILY
Qty: 30 TABLET | Refills: 0 | Status: SHIPPED | OUTPATIENT
Start: 2024-11-08

## 2024-11-18 ENCOUNTER — DOCUMENTATION ONLY (OUTPATIENT)
Dept: AUDIOLOGY | Facility: CLINIC | Age: 69
End: 2024-11-18
Payer: COMMERCIAL

## 2024-11-18 ENCOUNTER — TELEPHONE (OUTPATIENT)
Dept: AUDIOLOGY | Facility: CLINIC | Age: 69
End: 2024-11-18
Payer: COMMERCIAL

## 2024-11-18 DIAGNOSIS — H90.3 SENSORINEURAL HEARING LOSS, BILATERAL: Primary | ICD-10-CM

## 2024-11-18 NOTE — TELEPHONE ENCOUNTER
Returned call to patient. Confirmed address to mail batteries too. Faheem also requested additional wax filters be sent. Informed him I will get them sent out today.     Stefany Romero. CCC-A  Licensed Audiologist   [place] #[id number]

## 2024-11-18 NOTE — TELEPHONE ENCOUNTER
[facility] Call Center    Phone Message    May a detailed message be left on voicemail: yes     Reason for Call: Other: Patient called requesting a call back from care team. He is wondering if he can get more batteries for his hearing aids. He is unable to come pick them up due to having a scheduled surgery tomorrow. He is asking if they can be mailed to him. Please call to advise.      Action Taken: Other: Audiology.     Travel Screening: Not Applicable

## 2024-11-26 DIAGNOSIS — R07.9 CHEST PAIN, UNSPECIFIED TYPE: ICD-10-CM

## 2024-11-26 RX ORDER — NITROGLYCERIN 0.4 MG/1
TABLET SUBLINGUAL
Qty: 25 TABLET | Refills: 0 | Status: SHIPPED | OUTPATIENT
Start: 2024-11-26

## 2024-11-29 ENCOUNTER — APPOINTMENT (OUTPATIENT)
Dept: GENERAL RADIOLOGY | Facility: CLINIC | Age: 69
End: 2024-11-29
Attending: PHYSICIAN ASSISTANT

## 2024-11-29 ENCOUNTER — APPOINTMENT (OUTPATIENT)
Dept: CT IMAGING | Facility: CLINIC | Age: 69
End: 2024-11-29
Attending: PHYSICIAN ASSISTANT

## 2024-11-29 ENCOUNTER — HOSPITAL ENCOUNTER (EMERGENCY)
Facility: CLINIC | Age: 69
Discharge: HOME | End: 2024-11-30
Attending: PHYSICIAN ASSISTANT | Admitting: PHYSICIAN ASSISTANT

## 2024-11-29 VITALS
RESPIRATION RATE: 18 BRPM | SYSTOLIC BLOOD PRESSURE: 139 MMHG | HEART RATE: 67 BPM | TEMPERATURE: 97.7 F | BODY MASS INDEX: 21.63 KG/M2 | WEIGHT: 130 LBS | OXYGEN SATURATION: 95 % | DIASTOLIC BLOOD PRESSURE: 64 MMHG

## 2024-11-29 DIAGNOSIS — M25.512 LEFT SHOULDER PAIN: ICD-10-CM
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
## 2024-11-29 DIAGNOSIS — M25.552 HIP PAIN, LEFT: ICD-10-CM

## 2024-11-29 DIAGNOSIS — S22.42XA CLOSED FRACTURE OF MULTIPLE RIBS OF LEFT SIDE, INITIAL ENCOUNTER: ICD-10-CM

## 2024-11-29 PROCEDURE — 73610 X-RAY EXAM OF ANKLE: CPT | Mod: LT

## 2024-11-29 PROCEDURE — 70450 CT HEAD/BRAIN W/O DYE: CPT

## 2024-11-29 PROCEDURE — 73030 X-RAY EXAM OF SHOULDER: CPT | Mod: LT

## 2024-11-29 PROCEDURE — 99284 EMERGENCY DEPT VISIT MOD MDM: CPT | Mod: 25

## 2024-11-29 PROCEDURE — 72125 CT NECK SPINE W/O DYE: CPT

## 2024-11-29 PROCEDURE — 73060 X-RAY EXAM OF HUMERUS: CPT | Mod: LT

## 2024-11-29 PROCEDURE — 73502 X-RAY EXAM HIP UNI 2-3 VIEWS: CPT

## 2024-11-29 PROCEDURE — 250N000013 HC RX MED GY IP 250 OP 250 PS 637: Performed by: PHYSICIAN ASSISTANT

## 2024-11-29 RX ORDER — OXYCODONE HYDROCHLORIDE 5 MG/1
5 TABLET ORAL ONCE
Status: COMPLETED | OUTPATIENT
Start: 2024-11-29 | End: 2024-11-29

## 2024-11-29 RX ORDER — ACETAMINOPHEN 325 MG/1
975 TABLET ORAL ONCE
Status: COMPLETED | OUTPATIENT
Start: 2024-11-29 | End: 2024-11-29

## 2024-11-29 RX ADMIN — ACETAMINOPHEN 975 MG: 325 TABLET, FILM COATED ORAL at 20:18

## 2024-11-29 RX ADMIN — OXYCODONE HYDROCHLORIDE 5 MG: 5 TABLET ORAL at 20:58

## 2024-11-29 ASSESSMENT — ACTIVITIES OF DAILY LIVING (ADL)
ADLS_ACUITY_SCORE: 41

## 2024-11-29 ASSESSMENT — COLUMBIA-SUICIDE SEVERITY RATING SCALE - C-SSRS
2. HAVE YOU ACTUALLY HAD ANY THOUGHTS OF KILLING YOURSELF IN THE PAST MONTH?: NO
6. HAVE YOU EVER DONE ANYTHING, STARTED TO DO ANYTHING, OR PREPARED TO DO ANYTHING TO END YOUR LIFE?: NO
1. IN THE PAST MONTH, HAVE YOU WISHED YOU WERE DEAD OR WISHED YOU COULD GO TO SLEEP AND NOT WAKE UP?: NO
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
## 2024-11-30 NOTE — DISCHARGE INSTRUCTIONS
Use Tylenol 1000mg every 8 hours for pain.  Ice area of pain.  Use incentive spirometer to ensure that you do not develop a pneumonia.

## 2024-12-05 DIAGNOSIS — I10 ESSENTIAL HYPERTENSION: ICD-10-CM

## 2024-12-05 RX ORDER — AMLODIPINE BESYLATE 10 MG/1
10 TABLET ORAL DAILY
Qty: 90 TABLET | Refills: 1 | OUTPATIENT
Start: 2024-12-05
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
## 2024-12-15 ENCOUNTER — APPOINTMENT (OUTPATIENT)
Dept: CT IMAGING | Facility: CLINIC | Age: 69
DRG: 521 | End: 2024-12-15
Attending: EMERGENCY MEDICINE
Payer: COMMERCIAL

## 2024-12-15 ENCOUNTER — APPOINTMENT (OUTPATIENT)
Dept: GENERAL RADIOLOGY | Facility: CLINIC | Age: 69
DRG: 521 | End: 2024-12-15
Attending: STUDENT IN AN ORGANIZED HEALTH CARE EDUCATION/TRAINING PROGRAM
Payer: COMMERCIAL

## 2024-12-15 ENCOUNTER — HOSPITAL ENCOUNTER (INPATIENT)
Facility: CLINIC | Age: 69
DRG: 521 | End: 2024-12-15
Attending: EMERGENCY MEDICINE | Admitting: STUDENT IN AN ORGANIZED HEALTH CARE EDUCATION/TRAINING PROGRAM
Payer: COMMERCIAL

## 2024-12-15 ENCOUNTER — APPOINTMENT (OUTPATIENT)
Dept: ULTRASOUND IMAGING | Facility: CLINIC | Age: 69
DRG: 521 | End: 2024-12-15
Attending: STUDENT IN AN ORGANIZED HEALTH CARE EDUCATION/TRAINING PROGRAM
Payer: COMMERCIAL

## 2024-12-15 DIAGNOSIS — S72.002A CLOSED FRACTURE OF NECK OF LEFT FEMUR, INITIAL ENCOUNTER (H): ICD-10-CM

## 2024-12-15 DIAGNOSIS — I10 ESSENTIAL HYPERTENSION: ICD-10-CM

## 2024-12-15 LAB
ABO + RH BLD: NORMAL
ANION GAP SERPL CALCULATED.3IONS-SCNC: 11 MMOL/L (ref 7–15)
BASOPHILS # BLD AUTO: 0 10E3/UL (ref 0–0.2)
BASOPHILS NFR BLD AUTO: 0 %
BLD GP AB SCN SERPL QL: NEGATIVE
BUN SERPL-MCNC: 25.7 MG/DL (ref 8–23)
CALCIUM SERPL-MCNC: 9 MG/DL (ref 8.8–10.4)
CHLORIDE SERPL-SCNC: 101 MMOL/L (ref 98–107)
CREAT SERPL-MCNC: 0.87 MG/DL (ref 0.67–1.17)
EGFRCR SERPLBLD CKD-EPI 2021: >90 ML/MIN/1.73M2
EOSINOPHIL # BLD AUTO: 0.2 10E3/UL (ref 0–0.7)
EOSINOPHIL NFR BLD AUTO: 2 %
ERYTHROCYTE [DISTWIDTH] IN BLOOD BY AUTOMATED COUNT: 13.8 % (ref 10–15)
GLUCOSE BLDC GLUCOMTR-MCNC: 139 MG/DL (ref 70–99)
GLUCOSE SERPL-MCNC: 113 MG/DL (ref 70–99)
HCO3 SERPL-SCNC: 26 MMOL/L (ref 22–29)
HCT VFR BLD AUTO: 34.3 % (ref 40–53)
HGB BLD-MCNC: 11.2 G/DL (ref 13.3–17.7)
IMM GRANULOCYTES # BLD: 0.1 10E3/UL
IMM GRANULOCYTES NFR BLD: 1 %
LYMPHOCYTES # BLD AUTO: 1.5 10E3/UL (ref 0.8–5.3)
LYMPHOCYTES NFR BLD AUTO: 15 %
MCH RBC QN AUTO: 29.1 PG (ref 26.5–33)
MCHC RBC AUTO-ENTMCNC: 32.7 G/DL (ref 31.5–36.5)
MCV RBC AUTO: 89 FL (ref 78–100)
MONOCYTES # BLD AUTO: 0.6 10E3/UL (ref 0–1.3)
MONOCYTES NFR BLD AUTO: 6 %
NEUTROPHILS # BLD AUTO: 7.4 10E3/UL (ref 1.6–8.3)
NEUTROPHILS NFR BLD AUTO: 76 %
NRBC # BLD AUTO: 0 10E3/UL
NRBC BLD AUTO-RTO: 0 /100
PLATELET # BLD AUTO: 476 10E3/UL (ref 150–450)
POTASSIUM SERPL-SCNC: 4.2 MMOL/L (ref 3.4–5.3)
RBC # BLD AUTO: 3.85 10E6/UL (ref 4.4–5.9)
SODIUM SERPL-SCNC: 138 MMOL/L (ref 135–145)
SPECIMEN EXP DATE BLD: NORMAL
WBC # BLD AUTO: 9.8 10E3/UL (ref 4–11)

## 2024-12-15 PROCEDURE — 99223 1ST HOSP IP/OBS HIGH 75: CPT | Mod: AI | Performed by: STUDENT IN AN ORGANIZED HEALTH CARE EDUCATION/TRAINING PROGRAM

## 2024-12-15 PROCEDURE — 250N000013 HC RX MED GY IP 250 OP 250 PS 637: Performed by: STUDENT IN AN ORGANIZED HEALTH CARE EDUCATION/TRAINING PROGRAM

## 2024-12-15 PROCEDURE — 86900 BLOOD TYPING SEROLOGIC ABO: CPT | Performed by: STUDENT IN AN ORGANIZED HEALTH CARE EDUCATION/TRAINING PROGRAM

## 2024-12-15 PROCEDURE — 36415 COLL VENOUS BLD VENIPUNCTURE: CPT | Performed by: STUDENT IN AN ORGANIZED HEALTH CARE EDUCATION/TRAINING PROGRAM

## 2024-12-15 PROCEDURE — 36415 COLL VENOUS BLD VENIPUNCTURE: CPT | Performed by: EMERGENCY MEDICINE

## 2024-12-15 PROCEDURE — 250N000011 HC RX IP 250 OP 636: Performed by: STUDENT IN AN ORGANIZED HEALTH CARE EDUCATION/TRAINING PROGRAM

## 2024-12-15 PROCEDURE — 72131 CT LUMBAR SPINE W/O DYE: CPT

## 2024-12-15 PROCEDURE — 72192 CT PELVIS W/O DYE: CPT

## 2024-12-15 PROCEDURE — 99285 EMERGENCY DEPT VISIT HI MDM: CPT | Mod: 25

## 2024-12-15 PROCEDURE — 93005 ELECTROCARDIOGRAM TRACING: CPT

## 2024-12-15 PROCEDURE — 96374 THER/PROPH/DIAG INJ IV PUSH: CPT

## 2024-12-15 PROCEDURE — 120N000001 HC R&B MED SURG/OB

## 2024-12-15 PROCEDURE — 85025 COMPLETE CBC W/AUTO DIFF WBC: CPT | Performed by: EMERGENCY MEDICINE

## 2024-12-15 PROCEDURE — 250N000011 HC RX IP 250 OP 636: Performed by: EMERGENCY MEDICINE

## 2024-12-15 PROCEDURE — 80048 BASIC METABOLIC PNL TOTAL CA: CPT | Performed by: EMERGENCY MEDICINE

## 2024-12-15 PROCEDURE — 73552 X-RAY EXAM OF FEMUR 2/>: CPT | Mod: LT

## 2024-12-15 PROCEDURE — 250N000013 HC RX MED GY IP 250 OP 250 PS 637: Performed by: EMERGENCY MEDICINE

## 2024-12-15 PROCEDURE — 73700 CT LOWER EXTREMITY W/O DYE: CPT | Mod: LT

## 2024-12-15 PROCEDURE — 72170 X-RAY EXAM OF PELVIS: CPT

## 2024-12-15 PROCEDURE — 93970 EXTREMITY STUDY: CPT

## 2024-12-15 RX ORDER — DEXTROSE MONOHYDRATE 25 G/50ML
25-50 INJECTION, SOLUTION INTRAVENOUS
Status: DISCONTINUED | OUTPATIENT
Start: 2024-12-15 | End: 2024-12-21 | Stop reason: HOSPADM

## 2024-12-15 RX ORDER — HYDRALAZINE HYDROCHLORIDE 20 MG/ML
10 INJECTION INTRAMUSCULAR; INTRAVENOUS EVERY 4 HOURS PRN
Status: DISCONTINUED | OUTPATIENT
Start: 2024-12-15 | End: 2024-12-21 | Stop reason: HOSPADM

## 2024-12-15 RX ORDER — ACETAMINOPHEN 325 MG/1
975 TABLET ORAL 3 TIMES DAILY
Status: DISCONTINUED | OUTPATIENT
Start: 2024-12-15 | End: 2024-12-16

## 2024-12-15 RX ORDER — AMOXICILLIN 250 MG
1 CAPSULE ORAL 2 TIMES DAILY
Status: DISCONTINUED | OUTPATIENT
Start: 2024-12-15 | End: 2024-12-21 | Stop reason: HOSPADM

## 2024-12-15 RX ORDER — POLYETHYLENE GLYCOL 3350 17 G/17G
17 POWDER, FOR SOLUTION ORAL 2 TIMES DAILY PRN
Status: DISCONTINUED | OUTPATIENT
Start: 2024-12-15 | End: 2024-12-21 | Stop reason: HOSPADM

## 2024-12-15 RX ORDER — HYDROMORPHONE HCL IN WATER/PF 6 MG/30 ML
0.2 PATIENT CONTROLLED ANALGESIA SYRINGE INTRAVENOUS
Status: DISCONTINUED | OUTPATIENT
Start: 2024-12-15 | End: 2024-12-16

## 2024-12-15 RX ORDER — PROCHLORPERAZINE MALEATE 5 MG/1
5 TABLET ORAL EVERY 6 HOURS PRN
Status: DISCONTINUED | OUTPATIENT
Start: 2024-12-15 | End: 2024-12-21 | Stop reason: HOSPADM

## 2024-12-15 RX ORDER — HYDROMORPHONE HYDROCHLORIDE 1 MG/ML
0.5 INJECTION, SOLUTION INTRAMUSCULAR; INTRAVENOUS; SUBCUTANEOUS ONCE
Status: COMPLETED | OUTPATIENT
Start: 2024-12-15 | End: 2024-12-15

## 2024-12-15 RX ORDER — CARBOXYMETHYLCELLULOSE SODIUM 10 MG/ML
1 GEL OPHTHALMIC 3 TIMES DAILY PRN
COMMUNITY

## 2024-12-15 RX ORDER — HYDROMORPHONE HYDROCHLORIDE 1 MG/ML
0.5 INJECTION, SOLUTION INTRAMUSCULAR; INTRAVENOUS; SUBCUTANEOUS
Status: DISCONTINUED | OUTPATIENT
Start: 2024-12-15 | End: 2024-12-15

## 2024-12-15 RX ORDER — AMOXICILLIN 250 MG
2 CAPSULE ORAL 2 TIMES DAILY
Status: DISCONTINUED | OUTPATIENT
Start: 2024-12-15 | End: 2024-12-21 | Stop reason: HOSPADM

## 2024-12-15 RX ORDER — ATORVASTATIN CALCIUM 40 MG/1
40 TABLET, FILM COATED ORAL DAILY
Status: DISCONTINUED | OUTPATIENT
Start: 2024-12-15 | End: 2024-12-21 | Stop reason: HOSPADM

## 2024-12-15 RX ORDER — LIDOCAINE 40 MG/G
CREAM TOPICAL
Status: DISCONTINUED | OUTPATIENT
Start: 2024-12-15 | End: 2024-12-15

## 2024-12-15 RX ORDER — OXYCODONE HYDROCHLORIDE 5 MG/1
5 TABLET ORAL EVERY 4 HOURS PRN
Status: DISCONTINUED | OUTPATIENT
Start: 2024-12-15 | End: 2024-12-21 | Stop reason: HOSPADM

## 2024-12-15 RX ORDER — OXYCODONE HYDROCHLORIDE 5 MG/1
10 TABLET ORAL EVERY 4 HOURS PRN
Status: DISCONTINUED | OUTPATIENT
Start: 2024-12-15 | End: 2024-12-21 | Stop reason: HOSPADM

## 2024-12-15 RX ORDER — LABETALOL HYDROCHLORIDE 5 MG/ML
10 INJECTION, SOLUTION INTRAVENOUS
Status: DISCONTINUED | OUTPATIENT
Start: 2024-12-15 | End: 2024-12-21 | Stop reason: HOSPADM

## 2024-12-15 RX ORDER — ANTIOX #8/OM3/DHA/EPA/LUT/ZEAX 250-2.5 MG
1 CAPSULE ORAL 2 TIMES DAILY
COMMUNITY

## 2024-12-15 RX ORDER — CALCIUM CARBONATE 500 MG/1
1000 TABLET, CHEWABLE ORAL 4 TIMES DAILY PRN
Status: DISCONTINUED | OUTPATIENT
Start: 2024-12-15 | End: 2024-12-21 | Stop reason: HOSPADM

## 2024-12-15 RX ORDER — METHOCARBAMOL 500 MG/1
500 TABLET, FILM COATED ORAL 4 TIMES DAILY PRN
Status: DISCONTINUED | OUTPATIENT
Start: 2024-12-15 | End: 2024-12-21 | Stop reason: HOSPADM

## 2024-12-15 RX ORDER — HYDROMORPHONE HCL IN WATER/PF 6 MG/30 ML
0.4 PATIENT CONTROLLED ANALGESIA SYRINGE INTRAVENOUS
Status: DISCONTINUED | OUTPATIENT
Start: 2024-12-15 | End: 2024-12-16

## 2024-12-15 RX ORDER — BISACODYL 10 MG
10 SUPPOSITORY, RECTAL RECTAL DAILY PRN
Status: DISCONTINUED | OUTPATIENT
Start: 2024-12-15 | End: 2024-12-21 | Stop reason: HOSPADM

## 2024-12-15 RX ORDER — BISACODYL 5 MG
10 TABLET, DELAYED RELEASE (ENTERIC COATED) ORAL DAILY PRN
Status: DISCONTINUED | OUTPATIENT
Start: 2024-12-15 | End: 2024-12-21 | Stop reason: HOSPADM

## 2024-12-15 RX ORDER — ONDANSETRON 4 MG/1
4 TABLET, ORALLY DISINTEGRATING ORAL EVERY 6 HOURS PRN
Status: DISCONTINUED | OUTPATIENT
Start: 2024-12-15 | End: 2024-12-21 | Stop reason: HOSPADM

## 2024-12-15 RX ORDER — AMLODIPINE BESYLATE 10 MG/1
10 TABLET ORAL DAILY
Status: DISCONTINUED | OUTPATIENT
Start: 2024-12-15 | End: 2024-12-21 | Stop reason: HOSPADM

## 2024-12-15 RX ORDER — BISACODYL 5 MG
5 TABLET, DELAYED RELEASE (ENTERIC COATED) ORAL DAILY PRN
Status: DISCONTINUED | OUTPATIENT
Start: 2024-12-15 | End: 2024-12-21 | Stop reason: HOSPADM

## 2024-12-15 RX ORDER — VIT C/E/ZN/COPPR/LUTEIN/ZEAXAN 60 MG-6 MG
1 CAPSULE ORAL 2 TIMES DAILY
Status: DISCONTINUED | OUTPATIENT
Start: 2024-12-15 | End: 2024-12-21 | Stop reason: HOSPADM

## 2024-12-15 RX ORDER — NALOXONE HYDROCHLORIDE 0.4 MG/ML
0.4 INJECTION, SOLUTION INTRAMUSCULAR; INTRAVENOUS; SUBCUTANEOUS
Status: DISCONTINUED | OUTPATIENT
Start: 2024-12-15 | End: 2024-12-21 | Stop reason: HOSPADM

## 2024-12-15 RX ORDER — NALOXONE HYDROCHLORIDE 0.4 MG/ML
0.2 INJECTION, SOLUTION INTRAMUSCULAR; INTRAVENOUS; SUBCUTANEOUS
Status: DISCONTINUED | OUTPATIENT
Start: 2024-12-15 | End: 2024-12-21 | Stop reason: HOSPADM

## 2024-12-15 RX ORDER — ONDANSETRON 2 MG/ML
4 INJECTION INTRAMUSCULAR; INTRAVENOUS EVERY 6 HOURS PRN
Status: DISCONTINUED | OUTPATIENT
Start: 2024-12-15 | End: 2024-12-21 | Stop reason: HOSPADM

## 2024-12-15 RX ORDER — NICOTINE POLACRILEX 4 MG
15-30 LOZENGE BUCCAL
Status: DISCONTINUED | OUTPATIENT
Start: 2024-12-15 | End: 2024-12-21 | Stop reason: HOSPADM

## 2024-12-15 RX ORDER — MULTIPLE VITAMINS W/ MINERALS TAB 9MG-400MCG
1 TAB ORAL DAILY
Status: DISCONTINUED | OUTPATIENT
Start: 2024-12-16 | End: 2024-12-21 | Stop reason: HOSPADM

## 2024-12-15 RX ORDER — METHOCARBAMOL 500 MG/1
500 TABLET, FILM COATED ORAL ONCE
Status: COMPLETED | OUTPATIENT
Start: 2024-12-15 | End: 2024-12-15

## 2024-12-15 RX ORDER — NICOTINE 21 MG/24HR
1 PATCH, TRANSDERMAL 24 HOURS TRANSDERMAL DAILY
Status: DISCONTINUED | OUTPATIENT
Start: 2024-12-15 | End: 2024-12-21 | Stop reason: HOSPADM

## 2024-12-15 RX ORDER — NAPROXEN 250 MG/1
500 TABLET ORAL ONCE
Status: COMPLETED | OUTPATIENT
Start: 2024-12-15 | End: 2024-12-15

## 2024-12-15 RX ORDER — ASPIRIN 81 MG/1
81 TABLET ORAL DAILY
Status: DISCONTINUED | OUTPATIENT
Start: 2024-12-15 | End: 2024-12-16 | Stop reason: DRUGHIGH

## 2024-12-15 RX ORDER — POLYETHYLENE GLYCOL 3350 17 G
2 POWDER IN PACKET (EA) ORAL
Status: DISCONTINUED | OUTPATIENT
Start: 2024-12-15 | End: 2024-12-21 | Stop reason: HOSPADM

## 2024-12-15 RX ADMIN — METOPROLOL TARTRATE 12.5 MG: 25 TABLET, FILM COATED ORAL at 21:53

## 2024-12-15 RX ADMIN — Medication 1 CAPSULE: at 21:52

## 2024-12-15 RX ADMIN — ATORVASTATIN CALCIUM 40 MG: 40 TABLET, FILM COATED ORAL at 19:24

## 2024-12-15 RX ADMIN — AMLODIPINE BESYLATE 10 MG: 10 TABLET ORAL at 19:23

## 2024-12-15 RX ADMIN — ACETAMINOPHEN 975 MG: 325 TABLET, FILM COATED ORAL at 21:52

## 2024-12-15 RX ADMIN — NAPROXEN 500 MG: 250 TABLET ORAL at 13:53

## 2024-12-15 RX ADMIN — HYDROMORPHONE HYDROCHLORIDE 0.4 MG: 0.2 INJECTION, SOLUTION INTRAMUSCULAR; INTRAVENOUS; SUBCUTANEOUS at 22:55

## 2024-12-15 RX ADMIN — METHOCARBAMOL 500 MG: 500 TABLET ORAL at 13:53

## 2024-12-15 RX ADMIN — HYDROMORPHONE HYDROCHLORIDE 0.5 MG: 1 INJECTION, SOLUTION INTRAMUSCULAR; INTRAVENOUS; SUBCUTANEOUS at 15:36

## 2024-12-15 RX ADMIN — HYDROMORPHONE HYDROCHLORIDE 0.2 MG: 0.2 INJECTION, SOLUTION INTRAMUSCULAR; INTRAVENOUS; SUBCUTANEOUS at 19:25

## 2024-12-15 RX ADMIN — ACETAMINOPHEN 975 MG: 325 TABLET, FILM COATED ORAL at 15:38

## 2024-12-15 RX ADMIN — HYDROMORPHONE HYDROCHLORIDE 0.5 MG: 1 INJECTION, SOLUTION INTRAMUSCULAR; INTRAVENOUS; SUBCUTANEOUS at 14:39

## 2024-12-15 RX ADMIN — OXYCODONE HYDROCHLORIDE 5 MG: 5 TABLET ORAL at 15:48

## 2024-12-15 RX ADMIN — OXYCODONE HYDROCHLORIDE 5 MG: 5 TABLET ORAL at 21:54

## 2024-12-15 RX ADMIN — HYDROMORPHONE HYDROCHLORIDE 0.5 MG: 1 INJECTION, SOLUTION INTRAMUSCULAR; INTRAVENOUS; SUBCUTANEOUS at 16:42

## 2024-12-15 RX ADMIN — SENNOSIDES AND DOCUSATE SODIUM 1 TABLET: 50; 8.6 TABLET ORAL at 21:53

## 2024-12-15 RX ADMIN — METHOCARBAMOL 500 MG: 500 TABLET ORAL at 21:54

## 2024-12-15 ASSESSMENT — ACTIVITIES OF DAILY LIVING (ADL)
ADLS_ACUITY_SCORE: 41
ADLS_ACUITY_SCORE: 43
ADLS_ACUITY_SCORE: 41
ADLS_ACUITY_SCORE: 43
ADLS_ACUITY_SCORE: 41
ADLS_ACUITY_SCORE: 45
ADLS_ACUITY_SCORE: 43
ADLS_ACUITY_SCORE: 43
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
## 2024-12-15 NOTE — PLAN OF CARE
Ortho Brief:     Orthopedic team aware of this left femoral neck fracture. Patient tentatively added to OR schedule tomorrow, 12/15/2024, pending medical optimization tonight. Ortho team to follow in the morning with formal consult.     - NPO at midnight

## 2024-12-15 NOTE — PROGRESS NOTES
RECEIVING UNIT ED HANDOFF REVIEW    ED Nurse Handoff Report was reviewed by: Dustin Cassidy RN on December 15, 2024 at 5:13 PM

## 2024-12-15 NOTE — PHARMACY-ADMISSION MEDICATION HISTORY
Pharmacist Admission Medication History    Admission medication history is complete. The information provided in this note is only as accurate as the sources available at the time of the update.    Information Source(s): Patient via in-person    Pertinent Information: Patient takes insulin Aspart but new blood glucose monitor is not well understood bu patient.    Atorvastatin recently filled but patient uncertain he is taking.  Amlodipine dispensed 10/26 x 90 day supply but patient reports he is out of this med.      Changes made to PTA medication list:  Added: REFRESH eye drop, PRESERVISION  Deleted: None  Changed: None    Allergies reviewed with patient and updates made in EHR: no    Medication History Completed By: Benjamin Christie [facility] 12/15/2024 3:03 PM    PTA Med List   Medication Sig Note Last Dose/Taking    amLODIPine (NORVASC) 10 MG tablet Take 1 tablet (10 mg) by mouth daily. 12/15/2024: Patient reports he is out of this med Unknown    aspirin 81 MG chewable tablet CHEW AND SWALLOW 1 TABLET(81 MG) BY MOUTH DAILY  12/15/2024 Morning    atorvastatin (LIPITOR) 40 MG tablet TAKE 1 TABLET(40 MG) BY MOUTH DAILY 12/15/2024: Filled 12/6/2024 but patient was uncertain he was taking  Taking    carboxymethylcellulose PF (REFRESH LIQUIGEL) 1 % ophthalmic gel Place 1 drop into both eyes 3 times daily as needed for dry eyes.  Taking As Needed    insulin aspart (NOVOLOG FLEXPEN) 100 UNIT/ML pen USE THREE TIMES DAILY PER SLIDING SCALE 150-200 1 UNIT, 201-250 2 UNITS, 251-300 3 UNITS, 301-350 4 UNITS,>350 5 UNITS. MAX 10 UNITS PER DAY 12/15/2024: Patient has new BG monitor but is not confident how to use this tool  Taking    ketoconazole (NIZORAL) 2 % external shampoo Apply topically every 3 days.  Taking    metFORMIN (GLUCOPHAGE) 500 MG tablet TAKE 3 TABLETS BY MOUTH EVERY MORNING AND 2 TABLETS EVERY EVENING   +++ appointment needed for additional refills +++  12/15/2024 Morning    Multiple Vitamins-Minerals (PRESERVISION  AREDS 2) CAPS Take 1 capsule by mouth 2 times daily.  12/15/2024 Morning    multivitamin w/minerals (THERA-VIT-M) tablet Take 1 tablet by mouth daily  12/15/2024 Morning    nitroGLYcerin (NITROSTAT) 0.4 MG sublingual tablet TAKE ONE TABLET UNDER TONGUE AS NEEDED FOR CHEST PAIN FOR UP TO 3 DOSES. IF SYMPTOMS PERSIST 5 MINUTES AFTER 1ST DOSE CALL 911  Taking
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
## 2024-12-16 ENCOUNTER — ANESTHESIA EVENT (OUTPATIENT)
Dept: SURGERY | Facility: CLINIC | Age: 69
End: 2024-12-16
Payer: COMMERCIAL

## 2024-12-16 ENCOUNTER — APPOINTMENT (OUTPATIENT)
Dept: GENERAL RADIOLOGY | Facility: CLINIC | Age: 69
DRG: 521 | End: 2024-12-16
Payer: COMMERCIAL

## 2024-12-16 ENCOUNTER — ANESTHESIA (OUTPATIENT)
Dept: SURGERY | Facility: CLINIC | Age: 69
End: 2024-12-16
Payer: COMMERCIAL

## 2024-12-16 LAB
ALBUMIN SERPL BCG-MCNC: 3.6 G/DL (ref 3.5–5.2)
ALP SERPL-CCNC: 109 U/L (ref 40–150)
ALT SERPL W P-5'-P-CCNC: 18 U/L (ref 0–70)
ANION GAP SERPL CALCULATED.3IONS-SCNC: 9 MMOL/L (ref 7–15)
AST SERPL W P-5'-P-CCNC: 24 U/L (ref 0–45)
ATRIAL RATE - MUSE: 80 BPM
BILIRUB DIRECT SERPL-MCNC: <0.2 MG/DL (ref 0–0.3)
BILIRUB SERPL-MCNC: 0.2 MG/DL
BUN SERPL-MCNC: 23.4 MG/DL (ref 8–23)
CALCIUM SERPL-MCNC: 9.5 MG/DL (ref 8.8–10.4)
CHLORIDE SERPL-SCNC: 104 MMOL/L (ref 98–107)
CREAT SERPL-MCNC: 0.85 MG/DL (ref 0.67–1.17)
DIASTOLIC BLOOD PRESSURE - MUSE: NORMAL MMHG
EGFRCR SERPLBLD CKD-EPI 2021: >90 ML/MIN/1.73M2
ERYTHROCYTE [DISTWIDTH] IN BLOOD BY AUTOMATED COUNT: 13.8 % (ref 10–15)
GLUCOSE BLDC GLUCOMTR-MCNC: 112 MG/DL (ref 70–99)
GLUCOSE BLDC GLUCOMTR-MCNC: 126 MG/DL (ref 70–99)
GLUCOSE BLDC GLUCOMTR-MCNC: 133 MG/DL (ref 70–99)
GLUCOSE BLDC GLUCOMTR-MCNC: 136 MG/DL (ref 70–99)
GLUCOSE BLDC GLUCOMTR-MCNC: 224 MG/DL (ref 70–99)
GLUCOSE SERPL-MCNC: 131 MG/DL (ref 70–99)
HCO3 SERPL-SCNC: 28 MMOL/L (ref 22–29)
HCT VFR BLD AUTO: 33.1 % (ref 40–53)
HGB BLD-MCNC: 11.1 G/DL (ref 13.3–17.7)
INR PPP: 0.98 (ref 0.85–1.15)
INTERPRETATION ECG - MUSE: NORMAL
MCH RBC QN AUTO: 29.6 PG (ref 26.5–33)
MCHC RBC AUTO-ENTMCNC: 33.5 G/DL (ref 31.5–36.5)
MCV RBC AUTO: 88 FL (ref 78–100)
P AXIS - MUSE: 62 DEGREES
PLATELET # BLD AUTO: 487 10E3/UL (ref 150–450)
POTASSIUM SERPL-SCNC: 4.4 MMOL/L (ref 3.4–5.3)
PR INTERVAL - MUSE: 150 MS
PROT SERPL-MCNC: 6.9 G/DL (ref 6.4–8.3)
QRS DURATION - MUSE: 84 MS
QT - MUSE: 384 MS
QTC - MUSE: 442 MS
R AXIS - MUSE: -46 DEGREES
RBC # BLD AUTO: 3.75 10E6/UL (ref 4.4–5.9)
SODIUM SERPL-SCNC: 141 MMOL/L (ref 135–145)
SYSTOLIC BLOOD PRESSURE - MUSE: NORMAL MMHG
T AXIS - MUSE: 69 DEGREES
VENTRICULAR RATE- MUSE: 80 BPM
WBC # BLD AUTO: 8.3 10E3/UL (ref 4–11)

## 2024-12-16 PROCEDURE — 710N000009 HC RECOVERY PHASE 1, LEVEL 1, PER MIN: Performed by: ORTHOPAEDIC SURGERY

## 2024-12-16 PROCEDURE — 250N000011 HC RX IP 250 OP 636: Performed by: STUDENT IN AN ORGANIZED HEALTH CARE EDUCATION/TRAINING PROGRAM

## 2024-12-16 PROCEDURE — 85014 HEMATOCRIT: CPT | Performed by: STUDENT IN AN ORGANIZED HEALTH CARE EDUCATION/TRAINING PROGRAM

## 2024-12-16 PROCEDURE — 250N000013 HC RX MED GY IP 250 OP 250 PS 637

## 2024-12-16 PROCEDURE — 258N000003 HC RX IP 258 OP 636: Performed by: STUDENT IN AN ORGANIZED HEALTH CARE EDUCATION/TRAINING PROGRAM

## 2024-12-16 PROCEDURE — 85610 PROTHROMBIN TIME: CPT | Performed by: STUDENT IN AN ORGANIZED HEALTH CARE EDUCATION/TRAINING PROGRAM

## 2024-12-16 PROCEDURE — 99232 SBSQ HOSP IP/OBS MODERATE 35: CPT | Performed by: INTERNAL MEDICINE

## 2024-12-16 PROCEDURE — 258N000003 HC RX IP 258 OP 636

## 2024-12-16 PROCEDURE — 82248 BILIRUBIN DIRECT: CPT

## 2024-12-16 PROCEDURE — 250N000025 HC SEVOFLURANE, PER MIN: Performed by: ORTHOPAEDIC SURGERY

## 2024-12-16 PROCEDURE — 250N000009 HC RX 250

## 2024-12-16 PROCEDURE — 250N000013 HC RX MED GY IP 250 OP 250 PS 637: Performed by: STUDENT IN AN ORGANIZED HEALTH CARE EDUCATION/TRAINING PROGRAM

## 2024-12-16 PROCEDURE — 250N000011 HC RX IP 250 OP 636

## 2024-12-16 PROCEDURE — P9045 ALBUMIN (HUMAN), 5%, 250 ML: HCPCS

## 2024-12-16 PROCEDURE — 88300 SURGICAL PATH GROSS: CPT | Mod: TC | Performed by: ORTHOPAEDIC SURGERY

## 2024-12-16 PROCEDURE — 250N000009 HC RX 250: Performed by: ORTHOPAEDIC SURGERY

## 2024-12-16 PROCEDURE — 120N000001 HC R&B MED SURG/OB

## 2024-12-16 PROCEDURE — 999N000065 XR PELVIS PORT 1/2 VIEWS

## 2024-12-16 PROCEDURE — 258N000001 HC RX 258: Performed by: ORTHOPAEDIC SURGERY

## 2024-12-16 PROCEDURE — C1713 ANCHOR/SCREW BN/BN,TIS/BN: HCPCS | Performed by: ORTHOPAEDIC SURGERY

## 2024-12-16 PROCEDURE — C1776 JOINT DEVICE (IMPLANTABLE): HCPCS | Performed by: ORTHOPAEDIC SURGERY

## 2024-12-16 PROCEDURE — 36415 COLL VENOUS BLD VENIPUNCTURE: CPT | Performed by: STUDENT IN AN ORGANIZED HEALTH CARE EDUCATION/TRAINING PROGRAM

## 2024-12-16 PROCEDURE — 250N000009 HC RX 250: Performed by: STUDENT IN AN ORGANIZED HEALTH CARE EDUCATION/TRAINING PROGRAM

## 2024-12-16 PROCEDURE — 80048 BASIC METABOLIC PNL TOTAL CA: CPT | Performed by: STUDENT IN AN ORGANIZED HEALTH CARE EDUCATION/TRAINING PROGRAM

## 2024-12-16 PROCEDURE — 999N000141 HC STATISTIC PRE-PROCEDURE NURSING ASSESSMENT: Performed by: ORTHOPAEDIC SURGERY

## 2024-12-16 PROCEDURE — 88304 TISSUE EXAM BY PATHOLOGIST: CPT | Mod: TC | Performed by: ORTHOPAEDIC SURGERY

## 2024-12-16 PROCEDURE — 82565 ASSAY OF CREATININE: CPT | Performed by: STUDENT IN AN ORGANIZED HEALTH CARE EDUCATION/TRAINING PROGRAM

## 2024-12-16 PROCEDURE — 0SRS019 REPLACEMENT OF LEFT HIP JOINT, FEMORAL SURFACE WITH METAL SYNTHETIC SUBSTITUTE, CEMENTED, OPEN APPROACH: ICD-10-PCS | Performed by: ORTHOPAEDIC SURGERY

## 2024-12-16 PROCEDURE — 370N000017 HC ANESTHESIA TECHNICAL FEE, PER MIN: Performed by: ORTHOPAEDIC SURGERY

## 2024-12-16 PROCEDURE — 258N000003 HC RX IP 258 OP 636: Performed by: ANESTHESIOLOGY

## 2024-12-16 PROCEDURE — 250N000011 HC RX IP 250 OP 636: Performed by: ORTHOPAEDIC SURGERY

## 2024-12-16 PROCEDURE — 272N000001 HC OR GENERAL SUPPLY STERILE: Performed by: ORTHOPAEDIC SURGERY

## 2024-12-16 PROCEDURE — 84075 ASSAY ALKALINE PHOSPHATASE: CPT

## 2024-12-16 PROCEDURE — 360N000077 HC SURGERY LEVEL 4, PER MIN: Performed by: ORTHOPAEDIC SURGERY

## 2024-12-16 DEVICE — BIPOLAR COMPONENT
Type: IMPLANTABLE DEVICE | Site: HIP | Status: FUNCTIONAL
Brand: UHR

## 2024-12-16 DEVICE — UNIVERSAL DISTAL CEMENT SPACER
Type: IMPLANTABLE DEVICE | Site: HIP | Status: FUNCTIONAL
Brand: OMNIFIT

## 2024-12-16 DEVICE — 127 DEGREE CEMENTED HIP STEM
Type: IMPLANTABLE DEVICE | Site: HIP | Status: FUNCTIONAL
Brand: OMNIFIT

## 2024-12-16 DEVICE — IMPLANTABLE DEVICE: Type: IMPLANTABLE DEVICE | Site: HIP | Status: FUNCTIONAL

## 2024-12-16 DEVICE — BONE CEMENT SIMPLEX FULL DOSE 6191-1-001: Type: IMPLANTABLE DEVICE | Site: HIP | Status: FUNCTIONAL

## 2024-12-16 DEVICE — LOW FRICTION ION TREATMENT
Type: IMPLANTABLE DEVICE | Site: HIP | Status: FUNCTIONAL
Brand: C-TAPER HEAD

## 2024-12-16 RX ORDER — LIDOCAINE HYDROCHLORIDE 20 MG/ML
INJECTION, SOLUTION INFILTRATION; PERINEURAL PRN
Status: DISCONTINUED | OUTPATIENT
Start: 2024-12-16 | End: 2024-12-16

## 2024-12-16 RX ORDER — BISACODYL 10 MG
10 SUPPOSITORY, RECTAL RECTAL DAILY PRN
Status: DISCONTINUED | OUTPATIENT
Start: 2024-12-16 | End: 2024-12-16

## 2024-12-16 RX ORDER — ASPIRIN 81 MG/1
81 TABLET ORAL 2 TIMES DAILY
Status: DISCONTINUED | OUTPATIENT
Start: 2024-12-16 | End: 2024-12-17

## 2024-12-16 RX ORDER — ACETAMINOPHEN 325 MG/1
650 TABLET ORAL EVERY 4 HOURS PRN
Status: DISCONTINUED | OUTPATIENT
Start: 2024-12-19 | End: 2024-12-21 | Stop reason: HOSPADM

## 2024-12-16 RX ORDER — DEXAMETHASONE SODIUM PHOSPHATE 4 MG/ML
4 INJECTION, SOLUTION INTRA-ARTICULAR; INTRALESIONAL; INTRAMUSCULAR; INTRAVENOUS; SOFT TISSUE
Status: DISCONTINUED | OUTPATIENT
Start: 2024-12-16 | End: 2024-12-16 | Stop reason: HOSPADM

## 2024-12-16 RX ORDER — FENTANYL CITRATE 0.05 MG/ML
50 INJECTION, SOLUTION INTRAMUSCULAR; INTRAVENOUS EVERY 5 MIN PRN
Status: DISCONTINUED | OUTPATIENT
Start: 2024-12-16 | End: 2024-12-16 | Stop reason: HOSPADM

## 2024-12-16 RX ORDER — ONDANSETRON 2 MG/ML
4 INJECTION INTRAMUSCULAR; INTRAVENOUS EVERY 6 HOURS PRN
Status: DISCONTINUED | OUTPATIENT
Start: 2024-12-16 | End: 2024-12-16

## 2024-12-16 RX ORDER — HYDROMORPHONE HCL IN WATER/PF 6 MG/30 ML
0.4 PATIENT CONTROLLED ANALGESIA SYRINGE INTRAVENOUS EVERY 5 MIN PRN
Status: DISCONTINUED | OUTPATIENT
Start: 2024-12-16 | End: 2024-12-16 | Stop reason: HOSPADM

## 2024-12-16 RX ORDER — TRANEXAMIC ACID 10 MG/ML
1 INJECTION, SOLUTION INTRAVENOUS ONCE
Status: COMPLETED | OUTPATIENT
Start: 2024-12-16 | End: 2024-12-16

## 2024-12-16 RX ORDER — MAGNESIUM HYDROXIDE 1200 MG/15ML
LIQUID ORAL PRN
Status: DISCONTINUED | OUTPATIENT
Start: 2024-12-16 | End: 2024-12-16 | Stop reason: HOSPADM

## 2024-12-16 RX ORDER — ONDANSETRON 2 MG/ML
4 INJECTION INTRAMUSCULAR; INTRAVENOUS EVERY 30 MIN PRN
Status: DISCONTINUED | OUTPATIENT
Start: 2024-12-16 | End: 2024-12-16 | Stop reason: HOSPADM

## 2024-12-16 RX ORDER — NALOXONE HYDROCHLORIDE 0.4 MG/ML
0.1 INJECTION, SOLUTION INTRAMUSCULAR; INTRAVENOUS; SUBCUTANEOUS
Status: DISCONTINUED | OUTPATIENT
Start: 2024-12-16 | End: 2024-12-16 | Stop reason: HOSPADM

## 2024-12-16 RX ORDER — OXYCODONE HYDROCHLORIDE 5 MG/1
10 TABLET ORAL EVERY 4 HOURS PRN
Status: DISCONTINUED | OUTPATIENT
Start: 2024-12-16 | End: 2024-12-16

## 2024-12-16 RX ORDER — HYDROMORPHONE HCL IN WATER/PF 6 MG/30 ML
0.4 PATIENT CONTROLLED ANALGESIA SYRINGE INTRAVENOUS
Status: DISCONTINUED | OUTPATIENT
Start: 2024-12-16 | End: 2024-12-17

## 2024-12-16 RX ORDER — POLYETHYLENE GLYCOL 3350 17 G/17G
17 POWDER, FOR SOLUTION ORAL DAILY
Status: DISCONTINUED | OUTPATIENT
Start: 2024-12-17 | End: 2024-12-21 | Stop reason: HOSPADM

## 2024-12-16 RX ORDER — HYDROXYZINE HYDROCHLORIDE 25 MG/1
25 TABLET, FILM COATED ORAL 3 TIMES DAILY PRN
Status: DISCONTINUED | OUTPATIENT
Start: 2024-12-16 | End: 2024-12-21 | Stop reason: HOSPADM

## 2024-12-16 RX ORDER — AMOXICILLIN 250 MG
1 CAPSULE ORAL 2 TIMES DAILY
Status: DISCONTINUED | OUTPATIENT
Start: 2024-12-16 | End: 2024-12-16

## 2024-12-16 RX ORDER — TOBRAMYCIN 1.2 G/30ML
INJECTION, POWDER, LYOPHILIZED, FOR SOLUTION INTRAVENOUS PRN
Status: DISCONTINUED | OUTPATIENT
Start: 2024-12-16 | End: 2024-12-16 | Stop reason: HOSPADM

## 2024-12-16 RX ORDER — CEFAZOLIN SODIUM/WATER 2 G/20 ML
2 SYRINGE (ML) INTRAVENOUS
Status: COMPLETED | OUTPATIENT
Start: 2024-12-16 | End: 2024-12-16

## 2024-12-16 RX ORDER — PROPOFOL 10 MG/ML
INJECTION, EMULSION INTRAVENOUS PRN
Status: DISCONTINUED | OUTPATIENT
Start: 2024-12-16 | End: 2024-12-16

## 2024-12-16 RX ORDER — HYDROMORPHONE HCL IN WATER/PF 6 MG/30 ML
0.2 PATIENT CONTROLLED ANALGESIA SYRINGE INTRAVENOUS EVERY 5 MIN PRN
Status: DISCONTINUED | OUTPATIENT
Start: 2024-12-16 | End: 2024-12-16 | Stop reason: HOSPADM

## 2024-12-16 RX ORDER — VANCOMYCIN HYDROCHLORIDE 1 G/20ML
INJECTION, POWDER, LYOPHILIZED, FOR SOLUTION INTRAVENOUS PRN
Status: DISCONTINUED | OUTPATIENT
Start: 2024-12-16 | End: 2024-12-16 | Stop reason: HOSPADM

## 2024-12-16 RX ORDER — FENTANYL CITRATE 50 UG/ML
INJECTION, SOLUTION INTRAMUSCULAR; INTRAVENOUS PRN
Status: DISCONTINUED | OUTPATIENT
Start: 2024-12-16 | End: 2024-12-16

## 2024-12-16 RX ORDER — FENTANYL CITRATE 0.05 MG/ML
25 INJECTION, SOLUTION INTRAMUSCULAR; INTRAVENOUS EVERY 5 MIN PRN
Status: DISCONTINUED | OUTPATIENT
Start: 2024-12-16 | End: 2024-12-16 | Stop reason: HOSPADM

## 2024-12-16 RX ORDER — CEFAZOLIN SODIUM/WATER 2 G/20 ML
2 SYRINGE (ML) INTRAVENOUS SEE ADMIN INSTRUCTIONS
Status: DISCONTINUED | OUTPATIENT
Start: 2024-12-16 | End: 2024-12-16 | Stop reason: HOSPADM

## 2024-12-16 RX ORDER — ONDANSETRON 2 MG/ML
INJECTION INTRAMUSCULAR; INTRAVENOUS PRN
Status: DISCONTINUED | OUTPATIENT
Start: 2024-12-16 | End: 2024-12-16

## 2024-12-16 RX ORDER — OXYCODONE HYDROCHLORIDE 5 MG/1
5 TABLET ORAL EVERY 4 HOURS PRN
Status: DISCONTINUED | OUTPATIENT
Start: 2024-12-16 | End: 2024-12-16

## 2024-12-16 RX ORDER — CEFAZOLIN SODIUM 1 G/3ML
1 INJECTION, POWDER, FOR SOLUTION INTRAMUSCULAR; INTRAVENOUS EVERY 8 HOURS
Status: COMPLETED | OUTPATIENT
Start: 2024-12-16 | End: 2024-12-17

## 2024-12-16 RX ORDER — LIDOCAINE 40 MG/G
CREAM TOPICAL
Status: DISCONTINUED | OUTPATIENT
Start: 2024-12-16 | End: 2024-12-21 | Stop reason: HOSPADM

## 2024-12-16 RX ORDER — ONDANSETRON 4 MG/1
4 TABLET, ORALLY DISINTEGRATING ORAL EVERY 30 MIN PRN
Status: DISCONTINUED | OUTPATIENT
Start: 2024-12-16 | End: 2024-12-16 | Stop reason: HOSPADM

## 2024-12-16 RX ORDER — SODIUM CHLORIDE, SODIUM LACTATE, POTASSIUM CHLORIDE, CALCIUM CHLORIDE 600; 310; 30; 20 MG/100ML; MG/100ML; MG/100ML; MG/100ML
INJECTION, SOLUTION INTRAVENOUS CONTINUOUS
Status: DISCONTINUED | OUTPATIENT
Start: 2024-12-16 | End: 2024-12-16 | Stop reason: HOSPADM

## 2024-12-16 RX ORDER — PROCHLORPERAZINE MALEATE 5 MG/1
5 TABLET ORAL EVERY 6 HOURS PRN
Status: DISCONTINUED | OUTPATIENT
Start: 2024-12-16 | End: 2024-12-16

## 2024-12-16 RX ORDER — LIDOCAINE 50 MG/G
OINTMENT TOPICAL 3 TIMES DAILY
Status: DISCONTINUED | OUTPATIENT
Start: 2024-12-16 | End: 2024-12-21 | Stop reason: HOSPADM

## 2024-12-16 RX ORDER — ONDANSETRON 4 MG/1
4 TABLET, ORALLY DISINTEGRATING ORAL EVERY 6 HOURS PRN
Status: DISCONTINUED | OUTPATIENT
Start: 2024-12-16 | End: 2024-12-16

## 2024-12-16 RX ORDER — ACETAMINOPHEN 325 MG/1
975 TABLET ORAL EVERY 8 HOURS
Status: COMPLETED | OUTPATIENT
Start: 2024-12-16 | End: 2024-12-19

## 2024-12-16 RX ORDER — DEXAMETHASONE SODIUM PHOSPHATE 4 MG/ML
INJECTION, SOLUTION INTRA-ARTICULAR; INTRALESIONAL; INTRAMUSCULAR; INTRAVENOUS; SOFT TISSUE PRN
Status: DISCONTINUED | OUTPATIENT
Start: 2024-12-16 | End: 2024-12-16

## 2024-12-16 RX ORDER — SODIUM CHLORIDE, SODIUM LACTATE, POTASSIUM CHLORIDE, CALCIUM CHLORIDE 600; 310; 30; 20 MG/100ML; MG/100ML; MG/100ML; MG/100ML
INJECTION, SOLUTION INTRAVENOUS CONTINUOUS PRN
Status: DISCONTINUED | OUTPATIENT
Start: 2024-12-16 | End: 2024-12-16

## 2024-12-16 RX ORDER — HYDROMORPHONE HCL IN WATER/PF 6 MG/30 ML
0.2 PATIENT CONTROLLED ANALGESIA SYRINGE INTRAVENOUS
Status: DISCONTINUED | OUTPATIENT
Start: 2024-12-16 | End: 2024-12-17

## 2024-12-16 RX ADMIN — Medication 1 TABLET: at 08:28

## 2024-12-16 RX ADMIN — LIDOCAINE: 50 OINTMENT TOPICAL at 22:15

## 2024-12-16 RX ADMIN — TRANEXAMIC ACID 1 G: 10 INJECTION, SOLUTION INTRAVENOUS at 14:32

## 2024-12-16 RX ADMIN — DEXAMETHASONE SODIUM PHOSPHATE 4 MG: 4 INJECTION, SOLUTION INTRA-ARTICULAR; INTRALESIONAL; INTRAMUSCULAR; INTRAVENOUS; SOFT TISSUE at 14:32

## 2024-12-16 RX ADMIN — METOPROLOL TARTRATE 12.5 MG: 25 TABLET, FILM COATED ORAL at 08:28

## 2024-12-16 RX ADMIN — HYDROMORPHONE HYDROCHLORIDE 0.5 MG: 1 INJECTION, SOLUTION INTRAMUSCULAR; INTRAVENOUS; SUBCUTANEOUS at 16:14

## 2024-12-16 RX ADMIN — SODIUM CHLORIDE, POTASSIUM CHLORIDE, SODIUM LACTATE AND CALCIUM CHLORIDE: 600; 310; 30; 20 INJECTION, SOLUTION INTRAVENOUS at 14:03

## 2024-12-16 RX ADMIN — OXYCODONE HYDROCHLORIDE 10 MG: 5 TABLET ORAL at 05:51

## 2024-12-16 RX ADMIN — ACETAMINOPHEN 975 MG: 325 TABLET, FILM COATED ORAL at 08:29

## 2024-12-16 RX ADMIN — PHENYLEPHRINE HYDROCHLORIDE 100 MCG: 10 INJECTION INTRAVENOUS at 14:40

## 2024-12-16 RX ADMIN — DEXMEDETOMIDINE HYDROCHLORIDE 10 MCG: 100 INJECTION, SOLUTION INTRAVENOUS at 15:07

## 2024-12-16 RX ADMIN — SODIUM CHLORIDE, POTASSIUM CHLORIDE, SODIUM LACTATE AND CALCIUM CHLORIDE: 600; 310; 30; 20 INJECTION, SOLUTION INTRAVENOUS at 14:32

## 2024-12-16 RX ADMIN — PHENYLEPHRINE HYDROCHLORIDE 200 MCG: 10 INJECTION INTRAVENOUS at 15:33

## 2024-12-16 RX ADMIN — DEXMEDETOMIDINE HYDROCHLORIDE 10 MCG: 100 INJECTION, SOLUTION INTRAVENOUS at 15:10

## 2024-12-16 RX ADMIN — LIDOCAINE HYDROCHLORIDE 60 MG: 20 INJECTION, SOLUTION INFILTRATION; PERINEURAL at 14:32

## 2024-12-16 RX ADMIN — PROPOFOL 120 MG: 10 INJECTION, EMULSION INTRAVENOUS at 14:32

## 2024-12-16 RX ADMIN — PHENYLEPHRINE HYDROCHLORIDE 100 MCG: 10 INJECTION INTRAVENOUS at 15:58

## 2024-12-16 RX ADMIN — FENTANYL CITRATE 50 MCG: 50 INJECTION, SOLUTION INTRAMUSCULAR; INTRAVENOUS at 16:49

## 2024-12-16 RX ADMIN — PHENYLEPHRINE HYDROCHLORIDE 100 MCG: 10 INJECTION INTRAVENOUS at 16:05

## 2024-12-16 RX ADMIN — PHENYLEPHRINE HYDROCHLORIDE 100 MCG: 10 INJECTION INTRAVENOUS at 14:41

## 2024-12-16 RX ADMIN — TRANEXAMIC ACID 1 G: 10 INJECTION, SOLUTION INTRAVENOUS at 16:14

## 2024-12-16 RX ADMIN — PHENYLEPHRINE HYDROCHLORIDE 100 MCG: 10 INJECTION INTRAVENOUS at 15:15

## 2024-12-16 RX ADMIN — HYDROMORPHONE HYDROCHLORIDE 0.2 MG: 0.2 INJECTION, SOLUTION INTRAMUSCULAR; INTRAVENOUS; SUBCUTANEOUS at 08:34

## 2024-12-16 RX ADMIN — ASPIRIN 81 MG: 81 TABLET, COATED ORAL at 21:56

## 2024-12-16 RX ADMIN — CEFAZOLIN 1 G: 1 INJECTION, POWDER, FOR SOLUTION INTRAMUSCULAR; INTRAVENOUS at 21:59

## 2024-12-16 RX ADMIN — FENTANYL CITRATE 50 MCG: 50 INJECTION INTRAMUSCULAR; INTRAVENOUS at 15:00

## 2024-12-16 RX ADMIN — METHOCARBAMOL 500 MG: 500 TABLET ORAL at 11:12

## 2024-12-16 RX ADMIN — ONDANSETRON 4 MG: 2 INJECTION INTRAMUSCULAR; INTRAVENOUS at 16:14

## 2024-12-16 RX ADMIN — PHENYLEPHRINE HYDROCHLORIDE 100 MCG: 10 INJECTION INTRAVENOUS at 14:43

## 2024-12-16 RX ADMIN — PHENYLEPHRINE HYDROCHLORIDE 100 MCG: 10 INJECTION INTRAVENOUS at 15:49

## 2024-12-16 RX ADMIN — OXYCODONE HYDROCHLORIDE 10 MG: 5 TABLET ORAL at 23:43

## 2024-12-16 RX ADMIN — ACETAMINOPHEN 975 MG: 325 TABLET, FILM COATED ORAL at 21:56

## 2024-12-16 RX ADMIN — OXYCODONE HYDROCHLORIDE 10 MG: 5 TABLET ORAL at 01:52

## 2024-12-16 RX ADMIN — METHOCARBAMOL 500 MG: 500 TABLET ORAL at 03:41

## 2024-12-16 RX ADMIN — Medication 2 G: at 14:32

## 2024-12-16 RX ADMIN — ALBUMIN (HUMAN): 12.5 SOLUTION INTRAVENOUS at 15:25

## 2024-12-16 RX ADMIN — FENTANYL CITRATE 50 MCG: 50 INJECTION INTRAMUSCULAR; INTRAVENOUS at 14:32

## 2024-12-16 RX ADMIN — AMLODIPINE BESYLATE 10 MG: 10 TABLET ORAL at 08:29

## 2024-12-16 RX ADMIN — SENNOSIDES AND DOCUSATE SODIUM 2 TABLET: 50; 8.6 TABLET ORAL at 21:58

## 2024-12-16 RX ADMIN — Medication 1 CAPSULE: at 21:57

## 2024-12-16 RX ADMIN — PHENYLEPHRINE HYDROCHLORIDE 100 MCG: 10 INJECTION INTRAVENOUS at 14:51

## 2024-12-16 RX ADMIN — METOPROLOL TARTRATE 12.5 MG: 25 TABLET, FILM COATED ORAL at 21:57

## 2024-12-16 RX ADMIN — Medication 1 CAPSULE: at 08:28

## 2024-12-16 RX ADMIN — FENTANYL CITRATE 50 MCG: 50 INJECTION, SOLUTION INTRAMUSCULAR; INTRAVENOUS at 16:54

## 2024-12-16 RX ADMIN — OXYCODONE HYDROCHLORIDE 10 MG: 5 TABLET ORAL at 11:12

## 2024-12-16 RX ADMIN — METHOCARBAMOL 500 MG: 500 TABLET ORAL at 23:43

## 2024-12-16 ASSESSMENT — ACTIVITIES OF DAILY LIVING (ADL)
ADLS_ACUITY_SCORE: 52
ADLS_ACUITY_SCORE: 52
ADLS_ACUITY_SCORE: 45
ADLS_ACUITY_SCORE: 48
ADLS_ACUITY_SCORE: 45
ADLS_ACUITY_SCORE: 52
ADLS_ACUITY_SCORE: 49
ADLS_ACUITY_SCORE: 45
ADLS_ACUITY_SCORE: 52
ADLS_ACUITY_SCORE: 49
ADLS_ACUITY_SCORE: 45
ADLS_ACUITY_SCORE: 49
ADLS_ACUITY_SCORE: 52
ADLS_ACUITY_SCORE: 52
ADLS_ACUITY_SCORE: 48
ADLS_ACUITY_SCORE: 45
ADLS_ACUITY_SCORE: 48
ADLS_ACUITY_SCORE: 45
ADLS_ACUITY_SCORE: 52
ADLS_ACUITY_SCORE: 52
ADLS_ACUITY_SCORE: 45

## 2024-12-16 ASSESSMENT — LIFESTYLE VARIABLES: TOBACCO_USE: 1

## 2024-12-16 NOTE — OP NOTE
[facility]  Orthopedic Operative Note    Anterior Lateral Hip Hemiarthroplasty     Faheem Jeff MRN# [id number]   YOB: 1955  Procedure Date:  12/16/2024  Age: 69 year old       PREOPERATIVE DIAGNOSIS:  Displaced femoral neck fracture, left hip.    POSTOPERATIVE DIAGNOSIS:  Displaced femoral neck fracture,left hip.    PROCEDURE PERFORMED:  Left hip hemiarthroplasty.  Anterolateral approach.    SURGEON:  Lukasz Lees MD    FIRST ASSISTANT:  Wilbur Dang PA-C, whose was critical for positioning, retraction during exposure, placement of implants, and closure.     ANESTHESIA:  General     ESTIMATED BLOOD LOSS:  100 mL.       IMPLANTS:  .  Implant Name Type Inv. Item Serial No.  Lot No. LRB No. Used Action   BONE CEMENT SIMPLEX FULL DOSE 6191-1-001 - [id number] Cement, Bone BONE CEMENT SIMPLEX FULL DOSE 6191-1-001  [employer] [id number] Left 2 Implanted   IMP SPACER OSTEONICS DISTAL CEMENT 09MM 4595-5839 - [id number] Metallic Hardware/[facility] IMP SPACER OSTEONICS DISTAL CEMENT 09MM 1939-3009  [employer] [id number] Left 1 Implanted   IMP STEM FEMORAL RINA ROBBIE OMNIFIT SZ 5 127DEG 8708-2496 - [id number] Total Joint Component/Insert IMP STEM FEMORAL RINA ROBBIE OMNIFIT SZ 5 127DEG 8523-9962  [employer] [id number] Left 1 Implanted   Small Cement Restrictor Total Joint Component/Insert   [employer] [id number] Left 1 Implanted   IMP HEAD STRK FEMORAL C-TAPER COCR LFIT 28MM +5MM  - [id number] Total Joint Component/Insert IMP HEAD STRK FEMORAL C-TAPER COCR LFIT 28MM +5MM   [employer] [id number] Left 1 Implanted   IMP HEAD STRK FEMORAL UHR BIPOLAR [id number] UH1-52-28 - [id number] Total Joint Component/Insert IMP HEAD STRK FEMORAL UHR BIPOLAR [id number] UH1-52-28  [employer] [id number] Left 1 Implanted        INDICATIONS FOR PROCEDURE: Faheem Jeff is a 69 year old male who presents with a left displaced femoral neck fracture. We discussed  nonoperative and various operative treatment options including hemiarthroplasty and total hip arthroplasty. Risks of bleeding, infection, damage to surrounding neurovascular structures, hip dislocation, intraoperative or postoperative periprosthetic fracture, leg length inequality, blood clots including deep vein thrombosis and pulmonary embolism and anesthetic complications were discussed with patient.  Benefits of surgery discussed included improved function, reduction medical risk of hip fractures, and pain relief.  Alternatives include nonoperative management, which was not recommended.  The patient and family understands and wishes to proceed.  Consent signed by the patient.      DESCRIPTION OF PROCEDURE:  The patient was identified in the preoperative holding area per hospital policy and the correct operative site marked. Patient was brought into the to the operating room and placed supine on the operating room table.  After induction of general anesthesia he was placed into a right lateral decubitus position on hip positioner and all bony prominences well-padded. Chlorhexidine prescrub was performed followed by prepping and draping in normal sterile fashion.  Antibiotic administration and trans-examined acid administration were confirmed and timeout was performed per standard protocol.       A lateral incision was made centered over the greater trochanter proceeding sharply the skin and subtenons tissue down the fascia.  Fascia was incised in line with the incision.  A bursectomy was performed.  The anterior one third of the abductors were lifted off the anterior aspect of the femur using cautery.  A T-capsulotomy was performed.  Medial release was performed extending to the superior aspect the lesser trochanter. The capsule was tagged with #1 Vicryl.  The femoral neck was cut maintaining approximately 10 mm of neck proximal to the lesser trochanter. The head was removed with a corkscrew and noted to be sized  at a 52 mm.  We copiously irrigated the hip socket, removing all bony debris.  The box osteotome utilized to access the proximal femoral canal.  We broached to a size 5 broach trial, taking care to lateralize as needed with the broaches and/or lateralizing reamer.  We trialed with the appropriate length 127 degree offset neck trial and +5 mm inner diameter head.  Limb lengths were not quite symmetric with the left side shorter, however; at the start of the procedure the patient was notably shorter on the left, and trialing with longer construct lengths resulted in a nearly irreducible hip, which was still slightly shorter on the left.  This discrepancy appeared to be partially due to positioning and partially due to subacute presentation of the fracture.  The hip was stable in all positions including position sleep.  Minimal shuck was noted.  When that was  complete we cemented the size 5 127 degree Luciano stem using third generation cement technique.  Adequate blood pressure/hemodynamics were confirmed with anesthesia prior to pressurizing.  Excess cement was removed and the cup was inspected for loose cement.  The stem advanced the same level as the broach as such the real head and bipolar shell were impacted and placed in standard fashion.  The hip was reduced.    We performed a Betadine lavage per protocol with 15 cc of 10% Betadine and 500 cc of sterile saline.  Thoroughly irrigated the wound.  The capsule closed with #1 Vicryl.  We repaired down the gluteus minimus, and gluteus medius through bony tunnels with #5 Ethibond.  A local anesthetic mixture was infiltrated.  We then closed the wound in layers with #1 Vicryl and #1 Stratafix in the fascia, 2-0 Vicryl in the subcutaneous tissue, and staples in the skin.  Sterile dressings were applied with Aquacel Ag.  The patient was awoken from anesthesia and transported to the recovery room in stable condition, sustaining no complications.     Plan:  1.  Weightbearing  as tolerated with assistive devices as needed x 6 weeks and and as needed thereafter.   2.  Ancef x 24 hours.   3.  Lovenox inpatient, then OK to discharge on ASA 81 mg PO BID if mobilizing  4.  PACU x-rays AP pelvis  5.  Physical therapy/occupational therapy.   6.  Osteoporosis workup and treatment as outpatient  7.  Follow-up in 2-week wound check with x-rays .
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
## 2024-12-16 NOTE — PLAN OF CARE
Diagnosis: Left hip pain / Left femoral neck fracture  Mental Status: A&O x4  Activity/dangle: Bedrest, refused to lay in bed, preferred to be sitting at edge of bed for comfort  Diet: NPO  Pain: Managed with 1x IV Dilaudid, Robaxin and Oxycodone  Lopez/Voiding: Voiding in the urinal, BS at 1300 for 602, voided 100 after but refused BS and Catheter  Tele/Restraints/Iso: N/A  02/LDA: Room air. IV saline locked  D/C Date: TBD  Other Info: BG Check w/ insulin coverage. Report given to Johnny-op nurse Reta and pt taken down to johnny-op at 1320.

## 2024-12-16 NOTE — ANESTHESIA POSTPROCEDURE EVALUATION
Patient: Faheem Jeff    Procedure: Procedure(s):  LEFT HIP HEMIARTHROPLASTY       Anesthesia Type:  General    Note:     Postop Pain Control: Uneventful            Sign Out: Well controlled pain   PONV: No   Neuro/Psych: Uneventful            Sign Out: Acceptable/Baseline neuro status   Airway/Respiratory: Uneventful            Sign Out: Acceptable/Baseline resp. status   CV/Hemodynamics: Uneventful            Sign Out: Acceptable CV status; No obvious hypovolemia; No obvious fluid overload   Other NRE: NONE   DID A NON-ROUTINE EVENT OCCUR? No    Event details/Postop Comments:  I and O cath for 700 ml on bladder scan           Last vitals:  Vitals Value Taken Time   /74 12/16/24 1730   Temp 36  C (96.8  F) 12/16/24 1654   Pulse 76 12/16/24 1741   Resp 7 12/16/24 1741   SpO2 97 % 12/16/24 1741   Vitals shown include unfiled device data.    Electronically Signed By: Osbaldo Salvador MD  December 16, 2024  5:42 PM

## 2024-12-16 NOTE — PLAN OF CARE
Diagnosis: Left Hip Pain, Left Femoral neck fracture.  Mental Status: A&OX4. [place] he has one hearing aid.  Activity/dangle Strict bedrest.  Diet: Regular, NPO at midnight for tomorrow surgery.  Pain: Pain Managed with Dilaudid, Oxy. Robaxin and Tylenol.  Lopez/Voiding: Urinal.  Tele/Restraints/Iso: Nine.  02/LDA: ERIC VILLALTA.  D/C Date: Pending.  Other Info:

## 2024-12-16 NOTE — PLAN OF CARE
Goal Outcome Evaluation:    Trauma/Ortho/Medical (Choose one)  Trauma.    Diagnosis: Left Hip Pain, Left Femoral neck fracture.  Mental Status: A&OX4. [place] he has one hearing aid.  Activity/dangle Strict bedrest.  Diet: Regular, NPO at midnight for tomorrow surgery.  Pain: Pain Managed with Dilaudid, Oxy. Robaxin and Tylenol.  Lopez/Voiding: Urinal.  Tele/Restraints/Iso: Nine.  02/LDA: ERIC VILLALTA.  D/C Date: Pending.  Other Info: Pt BP elevated, and give schedule amlodipine, and metoprolol Provider mortified, BG check no insulin giving. CMS intact.

## 2024-12-16 NOTE — ANESTHESIA PROCEDURE NOTES
Airway       Patient location during procedure: OR  Staff -        Anesthesiologist:  Mitch Rubin MD       CRNA: Kimberley Swanson APRN CRNA       Performed By: CRNA  Consent for Airway        Urgency: elective  Indications and Patient Condition       Indications for airway management: johnny-procedural       Induction type:intravenous       Mask difficulty assessment: 0 - not attempted    Final Airway Details       Final airway type: supraglottic airway    Supraglottic Airway Details        Type: LMA       Brand: Ambu AuraGain       LMA size: 4    Post intubation assessment        Placement verified by: capnometry and chest rise        Number of attempts at approach: 1       Number of other approaches attempted: 0       Ease of procedure: easy       Dentition: Intact and Unchanged

## 2024-12-16 NOTE — ANESTHESIA PREPROCEDURE EVALUATION
"Anesthesia Pre-Procedure Evaluation    Patient: Faheem Jeff   MRN: [id number] : 1955        Procedure : Procedure(s):  LEFT HIP HEMIARTHROPLASTY          Past Medical History:   Diagnosis Date    Allergic state     Diabetes (H)     Displacement of cervical intervertebral disc without myelopathy     Hypertension     Osteoarthrosis, unspecified whether generalized or localized, unspecified site     Other, mixed, or unspecified nondependent drug abuse, continuous       No past surgical history on file.   Allergies   Allergen Reactions    Dust Mite Extract Unknown    Meclizine Other (See Comments)     Described \"feeling funny and burning.\"    Poplar Bud Extract Unknown    Trazodone And Nefazodone Unknown     \"Puts me in a coma\"      Social History     Tobacco Use    Smoking status: Every Day     Current packs/day: 0.50     Average packs/day: 0.5 packs/day for 62.0 years (31.0 ttl pk-yrs)     Types: Cigarettes     Start date:     Smokeless tobacco: Never   Substance Use Topics    Alcohol use: Not Currently     Comment: Abstinence x18 years; since TBI in       Wt Readings from Last 1 Encounters:   12/15/24 61.2 kg (135 lb)        Anesthesia Evaluation            ROS/MED HX  ENT/Pulmonary:     (+) sleep apnea, doesn't use CPAP,              tobacco use,                     (-) asthma   Neurologic: Comment: Bilateral sensorineural hearing loss, headaches      Cardiovascular:     (+) Dyslipidemia hypertension- -   -  - -                                      METS/Exercise Tolerance:     Hematologic:       Musculoskeletal:   (+)  arthritis,             GI/Hepatic:     (+) GERD,                   Renal/Genitourinary:    (-) renal disease   Endo:     (+)  type II DM,                    Psychiatric/Substance Use:     (+)   alcohol abuse  Recreational drug usage: Cocaine.    Infectious Disease:       Malignancy:       Other:      (+)  , H/O Chronic Pain,         Physical Exam    Airway        Mallampati: III   TM " "distance: > 3 FB   Neck ROM: full   Mouth opening: < 3 cm    Respiratory Devices and Support         Dental       (+) Multiple visibly decayed, broken teeth      Cardiovascular   cardiovascular exam normal          Pulmonary           (+) decreased breath sounds           OUTSIDE LABS:  CBC:   Lab Results   Component Value Date    WBC 8.3 12/16/2024    WBC 9.8 12/15/2024    HGB 11.1 (L) 12/16/2024    HGB 11.2 (L) 12/15/2024    HCT 33.1 (L) 12/16/2024    HCT 34.3 (L) 12/15/2024     (H) 12/16/2024     (H) 12/15/2024     BMP:   Lab Results   Component Value Date     12/16/2024     12/15/2024    POTASSIUM 4.4 12/16/2024    POTASSIUM 4.2 12/15/2024    CHLORIDE 104 12/16/2024    CHLORIDE 101 12/15/2024    CO2 28 12/16/2024    CO2 26 12/15/2024    BUN 23.4 (H) 12/16/2024    BUN 25.7 (H) 12/15/2024    CR 0.85 12/16/2024    CR 0.87 12/15/2024     (H) 12/16/2024     (H) 12/16/2024     COAGS:   Lab Results   Component Value Date    INR 0.98 12/16/2024     POC: No results found for: \"BGM\", \"HCG\", \"HCGS\"  HEPATIC:   Lab Results   Component Value Date    ALBUMIN 3.6 12/16/2024    PROTTOTAL 6.9 12/16/2024    ALT 18 12/16/2024    AST 24 12/16/2024    ALKPHOS 109 12/16/2024    BILITOTAL 0.2 12/16/2024     OTHER:   Lab Results   Component Value Date    A1C 7.1 (H) 11/07/2024    LORIN 9.5 12/16/2024    TSH 0.59 02/28/2019       Anesthesia Plan    ASA Status:  3    NPO Status:  NPO Appropriate    Anesthesia Type: General.     - Airway: LMA   Induction: Propofol.   Maintenance: Balanced.        Consents    Anesthesia Plan(s) and associated risks, benefits, and realistic alternatives discussed. Questions answered and patient/representative(s) expressed understanding.     - Discussed:     - Discussed with:  Patient            Postoperative Care    Pain management: IV analgesics, Oral pain medications, Multi-modal analgesia.   PONV prophylaxis: Ondansetron (or other 5HT-3), Dexamethasone or Solumedrol, " Background Propofol Infusion     Comments:    Other Comments: Patient unable to lay flat to complete block. Will consent for post-op block if needed.            Mitch Rubin MD    I have reviewed the pertinent notes and labs in the chart from the past 30 days and (re)examined the patient.  Any updates or changes from those notes are reflected in this note.             # Drug Induced Platelet Defect: home medication list includes an antiplatelet medication   # Hypertension: Noted on problem list          # DMII: A1C = 7.1 % (Ref range: 0.0 - 5.6 %) within past 6 months

## 2024-12-16 NOTE — ANESTHESIA CARE TRANSFER NOTE
Patient: Faheem Jeff    Procedure: Procedure(s):  LEFT HIP HEMIARTHROPLASTY       Diagnosis: Closed fracture of neck of left femur, initial encounter (H) [S72.002A]  Diagnosis Additional Information: No value filed.    Anesthesia Type:   General     Note:    Oropharynx: oropharynx clear of all foreign objects and spontaneously breathing  Level of Consciousness: awake  Oxygen Supplementation: face mask  Level of Supplemental Oxygen (L/min / FiO2): 6  Independent Airway: airway patency satisfactory and stable  Dentition: dentition unchanged  Vital Signs Stable: post-procedure vital signs reviewed and stable  Report to RN Given: handoff report given  Patient transferred to: PACU  Comments: At end of procedure, spontaneous respirations, adequate tidal volumes, followed commands to voice, LMA removed atraumatically, oropharynx suctioned, airway patent after LMA removal. Oxygen via facemask at 6 liters per minute to PACU. Oxygen tubing connected to wall O2 in PACU, SpO2, NiBP, and EKG monitors and alarms on and functioning, Aleksander Hugger warmer connected to patient gown, report on patient's clinical status given to PACU RN, RN questions answered.      Handoff Report: Identifed the Patient, Identified the Reponsible Provider, Reviewed the pertinent medical history, Discussed the surgical course, Reviewed Intra-OP anesthesia mangement and issues during anesthesia, Set expectations for post-procedure period and Allowed opportunity for questions and acknowledgement of understanding      Vitals:  Vitals Value Taken Time   /74 12/16/24 1635   Temp     Pulse 73 12/16/24 1637   Resp 9 12/16/24 1637   SpO2 100 % 12/16/24 1637   Vitals shown include unfiled device data.    Electronically Signed By: DAVID Pantoja CRNA  December 16, 2024  4:38 PM

## 2024-12-17 ENCOUNTER — APPOINTMENT (OUTPATIENT)
Dept: PHYSICAL THERAPY | Facility: CLINIC | Age: 69
DRG: 521 | End: 2024-12-17
Attending: STUDENT IN AN ORGANIZED HEALTH CARE EDUCATION/TRAINING PROGRAM
Payer: COMMERCIAL

## 2024-12-17 LAB
ANION GAP SERPL CALCULATED.3IONS-SCNC: 11 MMOL/L (ref 7–15)
BUN SERPL-MCNC: 32.8 MG/DL (ref 8–23)
CALCIUM SERPL-MCNC: 8.7 MG/DL (ref 8.8–10.4)
CHLORIDE SERPL-SCNC: 99 MMOL/L (ref 98–107)
CREAT SERPL-MCNC: 1.19 MG/DL (ref 0.67–1.17)
EGFRCR SERPLBLD CKD-EPI 2021: 65 ML/MIN/1.73M2
GLUCOSE BLDC GLUCOMTR-MCNC: 169 MG/DL (ref 70–99)
GLUCOSE BLDC GLUCOMTR-MCNC: 175 MG/DL (ref 70–99)
GLUCOSE BLDC GLUCOMTR-MCNC: 193 MG/DL (ref 70–99)
GLUCOSE BLDC GLUCOMTR-MCNC: 198 MG/DL (ref 70–99)
GLUCOSE BLDC GLUCOMTR-MCNC: 213 MG/DL (ref 70–99)
GLUCOSE SERPL-MCNC: 195 MG/DL (ref 70–99)
GLUCOSE SERPL-MCNC: 195 MG/DL (ref 70–99)
HCO3 SERPL-SCNC: 28 MMOL/L (ref 22–29)
HGB BLD-MCNC: 8.5 G/DL (ref 13.3–17.7)
POTASSIUM SERPL-SCNC: 4.4 MMOL/L (ref 3.4–5.3)
SODIUM SERPL-SCNC: 138 MMOL/L (ref 135–145)

## 2024-12-17 PROCEDURE — 250N000011 HC RX IP 250 OP 636

## 2024-12-17 PROCEDURE — 250N000013 HC RX MED GY IP 250 OP 250 PS 637: Performed by: STUDENT IN AN ORGANIZED HEALTH CARE EDUCATION/TRAINING PROGRAM

## 2024-12-17 PROCEDURE — 250N000013 HC RX MED GY IP 250 OP 250 PS 637: Performed by: PHYSICIAN ASSISTANT

## 2024-12-17 PROCEDURE — 97530 THERAPEUTIC ACTIVITIES: CPT | Mod: GP | Performed by: PHYSICAL THERAPIST

## 2024-12-17 PROCEDURE — 120N000001 HC R&B MED SURG/OB

## 2024-12-17 PROCEDURE — 99232 SBSQ HOSP IP/OBS MODERATE 35: CPT | Performed by: INTERNAL MEDICINE

## 2024-12-17 PROCEDURE — 250N000011 HC RX IP 250 OP 636: Performed by: PHYSICIAN ASSISTANT

## 2024-12-17 PROCEDURE — 250N000013 HC RX MED GY IP 250 OP 250 PS 637

## 2024-12-17 PROCEDURE — 80048 BASIC METABOLIC PNL TOTAL CA: CPT | Performed by: INTERNAL MEDICINE

## 2024-12-17 PROCEDURE — 85018 HEMOGLOBIN: CPT | Performed by: INTERNAL MEDICINE

## 2024-12-17 PROCEDURE — 250N000012 HC RX MED GY IP 250 OP 636 PS 637: Performed by: STUDENT IN AN ORGANIZED HEALTH CARE EDUCATION/TRAINING PROGRAM

## 2024-12-17 PROCEDURE — 97161 PT EVAL LOW COMPLEX 20 MIN: CPT | Mod: GP | Performed by: PHYSICAL THERAPIST

## 2024-12-17 PROCEDURE — 36415 COLL VENOUS BLD VENIPUNCTURE: CPT | Performed by: INTERNAL MEDICINE

## 2024-12-17 RX ORDER — ENOXAPARIN SODIUM 100 MG/ML
40 INJECTION SUBCUTANEOUS EVERY 24 HOURS
Status: DISCONTINUED | OUTPATIENT
Start: 2024-12-17 | End: 2024-12-21 | Stop reason: HOSPADM

## 2024-12-17 RX ORDER — HYDROMORPHONE HCL IN WATER/PF 6 MG/30 ML
0.2 PATIENT CONTROLLED ANALGESIA SYRINGE INTRAVENOUS
Status: DISCONTINUED | OUTPATIENT
Start: 2024-12-17 | End: 2024-12-17

## 2024-12-17 RX ADMIN — METOPROLOL TARTRATE 12.5 MG: 25 TABLET, FILM COATED ORAL at 21:45

## 2024-12-17 RX ADMIN — ACETAMINOPHEN 975 MG: 325 TABLET, FILM COATED ORAL at 12:52

## 2024-12-17 RX ADMIN — LIDOCAINE: 50 OINTMENT TOPICAL at 17:19

## 2024-12-17 RX ADMIN — LIDOCAINE: 50 OINTMENT TOPICAL at 21:58

## 2024-12-17 RX ADMIN — ACETAMINOPHEN 975 MG: 325 TABLET, FILM COATED ORAL at 05:36

## 2024-12-17 RX ADMIN — CEFAZOLIN 1 G: 1 INJECTION, POWDER, FOR SOLUTION INTRAMUSCULAR; INTRAVENOUS at 05:37

## 2024-12-17 RX ADMIN — METHOCARBAMOL 500 MG: 500 TABLET ORAL at 21:45

## 2024-12-17 RX ADMIN — POLYETHYLENE GLYCOL 3350 17 G: 17 POWDER, FOR SOLUTION ORAL at 08:43

## 2024-12-17 RX ADMIN — INSULIN ASPART 2 UNITS: 100 INJECTION, SOLUTION INTRAVENOUS; SUBCUTANEOUS at 08:57

## 2024-12-17 RX ADMIN — HYDROXYZINE HYDROCHLORIDE 25 MG: 25 TABLET, FILM COATED ORAL at 08:39

## 2024-12-17 RX ADMIN — METHOCARBAMOL 500 MG: 500 TABLET ORAL at 08:39

## 2024-12-17 RX ADMIN — METHOCARBAMOL 500 MG: 500 TABLET ORAL at 15:16

## 2024-12-17 RX ADMIN — OXYCODONE HYDROCHLORIDE 10 MG: 5 TABLET ORAL at 10:37

## 2024-12-17 RX ADMIN — HYDROXYZINE HYDROCHLORIDE 25 MG: 25 TABLET, FILM COATED ORAL at 21:45

## 2024-12-17 RX ADMIN — OXYCODONE HYDROCHLORIDE 10 MG: 5 TABLET ORAL at 19:54

## 2024-12-17 RX ADMIN — ENOXAPARIN SODIUM 40 MG: 40 INJECTION SUBCUTANEOUS at 08:44

## 2024-12-17 RX ADMIN — SENNOSIDES AND DOCUSATE SODIUM 1 TABLET: 50; 8.6 TABLET ORAL at 21:45

## 2024-12-17 RX ADMIN — INSULIN ASPART 2 UNITS: 100 INJECTION, SOLUTION INTRAVENOUS; SUBCUTANEOUS at 17:17

## 2024-12-17 RX ADMIN — ACETAMINOPHEN 975 MG: 325 TABLET, FILM COATED ORAL at 21:44

## 2024-12-17 RX ADMIN — OXYCODONE HYDROCHLORIDE 10 MG: 5 TABLET ORAL at 15:16

## 2024-12-17 RX ADMIN — Medication 1 CAPSULE: at 21:45

## 2024-12-17 RX ADMIN — INSULIN ASPART 1 UNITS: 100 INJECTION, SOLUTION INTRAVENOUS; SUBCUTANEOUS at 11:23

## 2024-12-17 RX ADMIN — Medication 1 TABLET: at 08:39

## 2024-12-17 RX ADMIN — ATORVASTATIN CALCIUM 40 MG: 40 TABLET, FILM COATED ORAL at 08:40

## 2024-12-17 RX ADMIN — LIDOCAINE: 50 OINTMENT TOPICAL at 08:44

## 2024-12-17 RX ADMIN — OXYCODONE HYDROCHLORIDE 10 MG: 5 TABLET ORAL at 05:36

## 2024-12-17 RX ADMIN — METOPROLOL TARTRATE 12.5 MG: 25 TABLET, FILM COATED ORAL at 08:39

## 2024-12-17 RX ADMIN — AMLODIPINE BESYLATE 10 MG: 10 TABLET ORAL at 08:39

## 2024-12-17 RX ADMIN — Medication 1 CAPSULE: at 08:39

## 2024-12-17 RX ADMIN — SENNOSIDES AND DOCUSATE SODIUM 1 TABLET: 50; 8.6 TABLET ORAL at 08:39

## 2024-12-17 ASSESSMENT — ACTIVITIES OF DAILY LIVING (ADL)
ADLS_ACUITY_SCORE: 50
ADLS_ACUITY_SCORE: 50
ADLS_ACUITY_SCORE: 49
ADLS_ACUITY_SCORE: 50
ADLS_ACUITY_SCORE: 49
ADLS_ACUITY_SCORE: 49
ADLS_ACUITY_SCORE: 50
ADLS_ACUITY_SCORE: 49
ADLS_ACUITY_SCORE: 50
ADLS_ACUITY_SCORE: 49
ADLS_ACUITY_SCORE: 50
ADLS_ACUITY_SCORE: 49
ADLS_ACUITY_SCORE: 49
ADLS_ACUITY_SCORE: 50
ADLS_ACUITY_SCORE: 49
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
## 2024-12-17 NOTE — PROGRESS NOTES
"Orthopedic Surgery  Faheem Jeff  12/17/2024     Admit Date:  12/15/2024    POD: 1 Day Post-Op   Procedure(s):  LEFT HIP HEMIARTHROPLASTY    Patient resting comfortably in bed.  Notes discomfort to the left hip but that it feels \"so much better\" compared to preoperatively.  Denies spasms.  No numbness or tingling to the LLE.  Patient feels his bilateral lower extremity edema has improved, but is still present.    Tolerating oral intake.    Denies nausea or vomiting.  Denies chest pain or shortness of breath.  No acute events overnight.    Temp:  [96.8  F (36  C)-98.6  F (37  C)] 97.4  F (36.3  C)  Pulse:  [74-96] 74  Resp:  [10-18] 18  BP: (123-193)/(54-86) 123/54  SpO2:  [95 %-100 %] 96 %    Alert and oriented. NAD. Non-toxic appearing. Breathing comfortably ORA.  Left hip Aquacel dressing is clean, dry, and intact.   Minimal erythema of the surrounding skin.   Mild to moderate bilateral lower leg, ankle, and foot edema, improved from previous. Non-pitting.  Thigh is soft and compressible.  Bilateral calves are soft, non-tender.  Left lower extremity is NVI.  Patient able to resist ankle dorsiflexion and plantar flexion bilaterally.  Full AROM of toes.  DP pulse palpable.  Sensation intact bilateral lower extremities.    Labs/Imaging:  Recent Labs   Lab Test 12/17/24  0925 12/16/24  0746 12/15/24  1440 11/07/24  1552   WBC  --  8.3 9.8 6.2   HGB 8.5* 11.1* 11.2* 12.1*   PLT  --  487* 476* 308     Recent Labs   Lab Test 12/16/24  0746   INR 0.98     A/P    1. S/p left hip hemiarthroplasty for management of a femoral neck fracture (DOS: 12/16/24)  -Continue Lovenox 40 mg daily while inpatient for DVT prophylaxis. Plan to discharge on ASA 81 mg BID x 6 weeks.   -Monitor Hgb.  -Periop Ancef completed.  -Mobilize with PT/OT.  -WBAT LLE with walker.   -No left hip active abduction or pivoting/twisting x 8 weeks postop.   -Continue current pain regimen.  -Dressings: Keep intact. Okay for RN to change if >60% saturated or " peeling/falling off.   -Follow-up: 2 weeks post-op with Dr. Lukasz Lees/Wilbur Dang PA-C    2. Disposition  -Anticipate d/c to TCU when medically cleared and progressing in PT.    Lashell Villanueva PA-C  [facility]

## 2024-12-17 NOTE — PLAN OF CARE
Goal Outcome Evaluation:      Trauma/Ortho/Medical (Choose one) Trauma.     Diagnosis: Left Hip Hemiarthroplasty;  POD#: 1  Mental Status: A&OX4, [place] Pt has one hearing aid. Can be agitated at times  Activity/dangle Not OOB yet.  Diet: Regular diet.  Pain: Managed with scheduled Tylenol, and PRN oxy & robaxin   Lopez/Voiding: bladder scanned @ 0506 was 359 ml  Tele/Restraints/Iso: None.  02/LDA: ERIC LILLY  D/C Date: Pending.  Other Info: , VSS on RA

## 2024-12-17 NOTE — PLAN OF CARE
Goal Outcome Evaluation:      Trauma/Ortho/Medical (Choose one) Trauma.    Diagnosis: Left Hip Hemiarthroplasty;  POD#: 0.  Mental Status: A&OX4, [place] Pt has one hearing aid..  Activity/dangle Not OOB yet.  Diet: Regular diet.  Pain: Managed with scheduled Tylenol, and Lidocaine ointment.  Lopez/Voiding:   Tele/Restraints/Iso: None.  02/LDA: ERIC VILLALTA.  D/C Date: Pending.  Other Info: Pt is BG check no insuling given, bladder scan to 650 and straight cath 800.
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
## 2024-12-17 NOTE — PROGRESS NOTES
"   12/17/24 0800   Appointment Info   Signing Clinician's Name / Credentials (PT) Fabricio Martínez DPT   Rehab Comments (PT) No active hip ABD; no pivoting/twisting; WBAT       Present no   Living Environment   People in Home alone   Current Living Arrangements independent living facility   Home Accessibility no concerns   Transportation Anticipated public transportation   Living Environment Comments Pt reports living alone in an ILF. No stairs. Pt reports he would like assist for transport at discharge. Pt reports he does not have assist at discharge.   Self-Care   Usual Activity Tolerance moderate   Current Activity Tolerance fair   Equipment Currently Used at Home other (see comments);hospital bed;grab bar, tub/shower   Activity/Exercise/Self-Care Comment Pt reports being IND at baseline with all ADLs. Pt reports using electric scooter at baseline but able to pivot from bed to scooter. Pt does not drive.   General Information   Onset of Illness/Injury or Date of Surgery 12/17/24   Referring Physician Israel Cotton MD   Patient/Family Therapy Goals Statement (PT) \"To get better\"   Pertinent History of Current Problem (include personal factors and/or comorbidities that impact the POC) Per Chart: LEFT HIP HEMIARTHROPLASTY POD#1   Existing Precautions/Restrictions fall;no active hip ABD;no pivoting or twisting   Weight-Bearing Status - LLE weight-bearing as tolerated   Weight-Bearing Status - RLE full weight-bearing   Cognition   Orientation Status (Cognition) oriented x 4   Pain Assessment   Patient Currently in Pain Yes, see Vital Sign flowsheet   Integumentary/Edema   Integumentary/Edema Comments Incision on L hip with dressing intact   Posture    Posture Forward head position;Protracted shoulders   Range of Motion (ROM)   Range of Motion ROM deficits secondary to surgical procedure;ROM deficits secondary to pain;ROM deficits secondary to swelling;ROM deficits secondary to weakness "   ROM Comment LLE ROM limited due to surgery   Strength (Manual Muscle Testing)   Strength (Manual Muscle Testing) Deficits observed during functional mobility;Able to perform R SLR   Strength Comments LLE Hip Flexion: 2/5   Bed Mobility   Comment, (Bed Mobility) Supine>sit w/ min A x 1   Transfers   Comment, (Transfers) Sit>stand w/ FWW and min A x 1   Gait/Stairs (Locomotion)   Comment, (Gait/Stairs) Did not initiate, pt uses electric scooter at baseline.   Balance   Balance Comments Adequate static sitting balance; pt unsteady with OOB activity   Sensory Examination   Sensory Perception patient reports no sensory changes   Clinical Impression   Criteria for Skilled Therapeutic Intervention Yes, treatment indicated   PT Diagnosis (PT) Impaired transfers   Influenced by the following impairments Decreased activity tolerance; decreased balance; decreased strength   Functional limitations due to impairments Impaired functional mobility   Clinical Presentation (PT Evaluation Complexity) stable   Clinical Presentation Rationale Clinical judgement   Clinical Decision Making (Complexity) low complexity   Planned Therapy Interventions (PT) bed mobility training;balance training;patient/family education;strengthening;transfer training;progressive activity/exercise   Risk & Benefits of therapy have been explained evaluation/treatment results reviewed;care plan/treatment goals reviewed;risks/benefits reviewed;current/potential barriers reviewed;participants voiced agreement with care plan;participants included;patient   PT Total Evaluation Time   PT Eval, Low Complexity Minutes (39554) 10   Physical Therapy Goals   PT Frequency Daily  (May refuse TCU)   PT Predicted Duration/Target Date for Goal Attainment 12/24/24   PT Goals Bed Mobility;Transfers   PT: Bed Mobility Independent;Supine to/from sit;Within precautions   PT: Transfers Modified independent;Sit to/from stand;Assistive device;Within precautions;Bed to/from chair    Interventions   Interventions Quick Adds Therapeutic Activity   Therapeutic Activity   Therapeutic Activities: dynamic activities to improve functional performance Minutes (81093) 24   Symptoms Noted During/After Treatment Fatigue;Increased pain   Treatment Detail/Skilled Intervention Greeted pt supine in bed, agreed to PT. VSS on RA throughout session. PT educated pt on WBAT status and hip precautions, pt voiced understanding. Pt performed supine>sit w/ min A x 1, needing assist to safely lift LLE off of bed. Pt initially attempted to use RLE as lever to assist moving LLE, unable to complete. Once in sitting, pt able to scoot self to EOB and sit unsupported without  LOB. Pt performed sit>stand x 2 w/ FWW and min A x 1, needing assist to stand fully upright. Verbal cues for hand placement. Pt unsteady in standing, willing to put minimal weight through LLE due to pain and weakness. Encouraged pt to attempt to WB through LLE, pt continuing to decline. Pt unable to tolerate further activity due to pain and weakness. Pt returned to supine w/ mod A x 1, needing assist to safely lift LLE back into bed. Discussed discharge recommendations with pt, pt resistant to attend TCU at this time despited rationale from PT. Pt ended session supine in bed, with all needs met and call light within reach.   PT Discharge Planning   PT Plan Review precautions; bed mobility; sit>stands; transfer to chair as able   PT Discharge Recommendation (DC Rec) Transitional Care Facility   PT Rationale for DC Rec Pt is below baseline. Pt currently requiring assist with all functional mobility. Pt presents with deficits in activity tolerance, balance, and strength. Due to these deficits, pt is a high falls risk and unsafe to be home alone at this time. Pt would benefit from continued skilled PT services via TCU to address deficits and improve IND with safety and functional mobility.   PT Brief overview of current status Goals of therapy will be to  address safe mobility and make recs for d/c to next level of care. Pt and RN will continue to follow all falls risk precautions as documented by RN staff while hospitalized. Recommend nursing use A x 2 w/ SS for transfers   Physical Therapy Time and Intention   Timed Code Treatment Minutes 24   Total Session Time (sum of timed and untimed services) 34

## 2024-12-18 ENCOUNTER — APPOINTMENT (OUTPATIENT)
Dept: PHYSICAL THERAPY | Facility: CLINIC | Age: 69
DRG: 521 | End: 2024-12-18
Payer: COMMERCIAL

## 2024-12-18 LAB
ANION GAP SERPL CALCULATED.3IONS-SCNC: 6 MMOL/L (ref 7–15)
BUN SERPL-MCNC: 24.7 MG/DL (ref 8–23)
CALCIUM SERPL-MCNC: 9.2 MG/DL (ref 8.8–10.4)
CHLORIDE SERPL-SCNC: 99 MMOL/L (ref 98–107)
CREAT SERPL-MCNC: 1 MG/DL (ref 0.67–1.17)
EGFRCR SERPLBLD CKD-EPI 2021: 80 ML/MIN/1.73M2
ERYTHROCYTE [DISTWIDTH] IN BLOOD BY AUTOMATED COUNT: 14.3 % (ref 10–15)
GLUCOSE BLDC GLUCOMTR-MCNC: 165 MG/DL (ref 70–99)
GLUCOSE BLDC GLUCOMTR-MCNC: 194 MG/DL (ref 70–99)
GLUCOSE BLDC GLUCOMTR-MCNC: 195 MG/DL (ref 70–99)
GLUCOSE BLDC GLUCOMTR-MCNC: 232 MG/DL (ref 70–99)
GLUCOSE SERPL-MCNC: 138 MG/DL (ref 70–99)
HCO3 SERPL-SCNC: 30 MMOL/L (ref 22–29)
HCT VFR BLD AUTO: 30.1 % (ref 40–53)
HGB BLD-MCNC: 10.3 G/DL (ref 13.3–17.7)
MCH RBC QN AUTO: 30 PG (ref 26.5–33)
MCHC RBC AUTO-ENTMCNC: 34.2 G/DL (ref 31.5–36.5)
MCV RBC AUTO: 88 FL (ref 78–100)
PLATELET # BLD AUTO: 429 10E3/UL (ref 150–450)
POTASSIUM SERPL-SCNC: 4.5 MMOL/L (ref 3.4–5.3)
RBC # BLD AUTO: 3.43 10E6/UL (ref 4.4–5.9)
SODIUM SERPL-SCNC: 135 MMOL/L (ref 135–145)
WBC # BLD AUTO: 10.4 10E3/UL (ref 4–11)

## 2024-12-18 PROCEDURE — 120N000001 HC R&B MED SURG/OB

## 2024-12-18 PROCEDURE — 999N000111 HC STATISTIC OT IP EVAL DEFER: Performed by: OCCUPATIONAL THERAPIST

## 2024-12-18 PROCEDURE — 36415 COLL VENOUS BLD VENIPUNCTURE: CPT | Performed by: INTERNAL MEDICINE

## 2024-12-18 PROCEDURE — 99232 SBSQ HOSP IP/OBS MODERATE 35: CPT | Performed by: INTERNAL MEDICINE

## 2024-12-18 PROCEDURE — 250N000013 HC RX MED GY IP 250 OP 250 PS 637

## 2024-12-18 PROCEDURE — 97530 THERAPEUTIC ACTIVITIES: CPT | Mod: GP | Performed by: PHYSICAL THERAPIST

## 2024-12-18 PROCEDURE — 250N000013 HC RX MED GY IP 250 OP 250 PS 637: Performed by: STUDENT IN AN ORGANIZED HEALTH CARE EDUCATION/TRAINING PROGRAM

## 2024-12-18 PROCEDURE — 250N000011 HC RX IP 250 OP 636: Performed by: PHYSICIAN ASSISTANT

## 2024-12-18 PROCEDURE — 85041 AUTOMATED RBC COUNT: CPT | Performed by: INTERNAL MEDICINE

## 2024-12-18 PROCEDURE — 85018 HEMOGLOBIN: CPT | Performed by: INTERNAL MEDICINE

## 2024-12-18 PROCEDURE — 80048 BASIC METABOLIC PNL TOTAL CA: CPT | Performed by: INTERNAL MEDICINE

## 2024-12-18 PROCEDURE — 250N000013 HC RX MED GY IP 250 OP 250 PS 637: Performed by: PHYSICIAN ASSISTANT

## 2024-12-18 RX ORDER — HYDROXYZINE HYDROCHLORIDE 25 MG/1
25 TABLET, FILM COATED ORAL 3 TIMES DAILY PRN
DISCHARGE
Start: 2024-12-18 | End: 2024-12-20

## 2024-12-18 RX ORDER — ASPIRIN 81 MG/1
81 TABLET ORAL 2 TIMES DAILY
DISCHARGE
Start: 2024-12-18 | End: 2025-01-29

## 2024-12-18 RX ORDER — POLYETHYLENE GLYCOL 3350 17 G/17G
17 POWDER, FOR SOLUTION ORAL DAILY
DISCHARGE
Start: 2024-12-18 | End: 2024-12-20

## 2024-12-18 RX ORDER — ACETAMINOPHEN 325 MG/1
975 TABLET ORAL EVERY 8 HOURS PRN
DISCHARGE
Start: 2024-12-18 | End: 2024-12-20

## 2024-12-18 RX ORDER — OXYCODONE HYDROCHLORIDE 5 MG/1
5-10 TABLET ORAL EVERY 4 HOURS PRN
Qty: 25 TABLET | Refills: 0 | Status: SHIPPED | OUTPATIENT
Start: 2024-12-18

## 2024-12-18 RX ORDER — METHOCARBAMOL 500 MG/1
500 TABLET, FILM COATED ORAL 4 TIMES DAILY PRN
DISCHARGE
Start: 2024-12-18 | End: 2024-12-20

## 2024-12-18 RX ORDER — AMOXICILLIN 250 MG
1 CAPSULE ORAL 2 TIMES DAILY
DISCHARGE
Start: 2024-12-18 | End: 2024-12-20

## 2024-12-18 RX ADMIN — LIDOCAINE: 50 OINTMENT TOPICAL at 17:13

## 2024-12-18 RX ADMIN — OXYCODONE HYDROCHLORIDE 10 MG: 5 TABLET ORAL at 20:47

## 2024-12-18 RX ADMIN — ATORVASTATIN CALCIUM 40 MG: 40 TABLET, FILM COATED ORAL at 08:56

## 2024-12-18 RX ADMIN — HYDROXYZINE HYDROCHLORIDE 25 MG: 25 TABLET, FILM COATED ORAL at 07:44

## 2024-12-18 RX ADMIN — METOPROLOL TARTRATE 12.5 MG: 25 TABLET, FILM COATED ORAL at 08:56

## 2024-12-18 RX ADMIN — METHOCARBAMOL 500 MG: 500 TABLET ORAL at 04:28

## 2024-12-18 RX ADMIN — LIDOCAINE: 50 OINTMENT TOPICAL at 08:55

## 2024-12-18 RX ADMIN — Medication 1 CAPSULE: at 08:57

## 2024-12-18 RX ADMIN — SENNOSIDES AND DOCUSATE SODIUM 1 TABLET: 50; 8.6 TABLET ORAL at 20:47

## 2024-12-18 RX ADMIN — OXYCODONE HYDROCHLORIDE 10 MG: 5 TABLET ORAL at 08:56

## 2024-12-18 RX ADMIN — OXYCODONE HYDROCHLORIDE 10 MG: 5 TABLET ORAL at 04:28

## 2024-12-18 RX ADMIN — OXYCODONE HYDROCHLORIDE 10 MG: 5 TABLET ORAL at 17:13

## 2024-12-18 RX ADMIN — INSULIN ASPART 2 UNITS: 100 INJECTION, SOLUTION INTRAVENOUS; SUBCUTANEOUS at 17:16

## 2024-12-18 RX ADMIN — ENOXAPARIN SODIUM 40 MG: 40 INJECTION SUBCUTANEOUS at 08:55

## 2024-12-18 RX ADMIN — ACETAMINOPHEN 975 MG: 325 TABLET, FILM COATED ORAL at 12:02

## 2024-12-18 RX ADMIN — POLYETHYLENE GLYCOL 3350 17 G: 17 POWDER, FOR SOLUTION ORAL at 08:56

## 2024-12-18 RX ADMIN — ACETAMINOPHEN 975 MG: 325 TABLET, FILM COATED ORAL at 20:47

## 2024-12-18 RX ADMIN — OXYCODONE HYDROCHLORIDE 10 MG: 5 TABLET ORAL at 12:47

## 2024-12-18 RX ADMIN — METHOCARBAMOL 500 MG: 500 TABLET ORAL at 20:48

## 2024-12-18 RX ADMIN — METHOCARBAMOL 500 MG: 500 TABLET ORAL at 12:02

## 2024-12-18 RX ADMIN — INSULIN ASPART 2 UNITS: 100 INJECTION, SOLUTION INTRAVENOUS; SUBCUTANEOUS at 12:03

## 2024-12-18 RX ADMIN — AMLODIPINE BESYLATE 10 MG: 10 TABLET ORAL at 08:57

## 2024-12-18 RX ADMIN — ACETAMINOPHEN 975 MG: 325 TABLET, FILM COATED ORAL at 04:29

## 2024-12-18 RX ADMIN — Medication 1 TABLET: at 08:57

## 2024-12-18 RX ADMIN — OXYCODONE HYDROCHLORIDE 10 MG: 5 TABLET ORAL at 00:06

## 2024-12-18 RX ADMIN — SENNOSIDES AND DOCUSATE SODIUM 2 TABLET: 50; 8.6 TABLET ORAL at 08:56

## 2024-12-18 RX ADMIN — METOPROLOL TARTRATE 12.5 MG: 25 TABLET, FILM COATED ORAL at 20:48

## 2024-12-18 ASSESSMENT — ACTIVITIES OF DAILY LIVING (ADL)
ADLS_ACUITY_SCORE: 51
ADLS_ACUITY_SCORE: 55
ADLS_ACUITY_SCORE: 51
ADLS_ACUITY_SCORE: 55
DEPENDENT_IADLS:: INDEPENDENT
ADLS_ACUITY_SCORE: 51
ADLS_ACUITY_SCORE: 55
ADLS_ACUITY_SCORE: 51
ADLS_ACUITY_SCORE: 51
ADLS_ACUITY_SCORE: 55
ADLS_ACUITY_SCORE: 51
ADLS_ACUITY_SCORE: 55
ADLS_ACUITY_SCORE: 51
ADLS_ACUITY_SCORE: 55
ADLS_ACUITY_SCORE: 51

## 2024-12-18 NOTE — PLAN OF CARE
Goal Outcome Evaluation:  Diagnosis: L hip hemiarthroplasty  POD#:2    Orientation: Aox4, [place]    Vitals/Tele: VSS on RA ex HTN    Pain: Scheduled tylenol, robaxin x1, oxy x2.     IV Access/drains: PIV SL    Diet: Reg, BG check    Mobility: WBAT Ax1 GB/W    GI/: Continent B/B, urinal bedside    Wound/Skin: L hip dressing CDI, L hip swelling. BLE edema.    Consults: PT, OT, pain, SW    Discharge Plan: -Anticipate 2-4 days, d/c to TCU when medically cleared and progressing in PT.      See Flow sheets for assessment

## 2024-12-18 NOTE — PLAN OF CARE
Goal Outcome Evaluation:         Date/Time: 12/17/24 3913-4170    Trauma/Ortho/Medical (Choose one) Ortho    Diagnosis: L hip hemiarthroplasty  POD#:1  Mental Status: Aox4, can be easily agitated or refuse cares when in pain - approach with a kind attitude.   Activity/dangle: Ax1, stands at bedside to use urinal.  Diet:Regular, needs reminders/help ordering meals  Pain:L Hip pain managed with scheduled tylenol, lidcaine ointment. PRN oxycodone, robaxin and atarax.   Lopez/Voiding:Continent b/b, using urinal at bedside.   Tele/Restraints/Iso:n/a  02/LDA:VSS on RA. PIV SL.  D/C Date:TBD  Other Info: L hip dressing CDI, L hip swelling. CMS intact. BLE edema. , 169, 193.

## 2024-12-18 NOTE — PROGRESS NOTES
"Orthopedic Surgery  Faheem eJff  12/18/2024     Admit Date:  12/15/2024    POD: 2 Days Post-Op   Procedure(s):  LEFT HIP HEMIARTHROPLASTY    Patient sitting upright in bed eating breakfast.  Patient reports he feels \"good at rest\" but movement increases his left hip pain.   He states he has not been out of bed yet (mostly relies on motorized scooter at baseline).  Denies spasms.  No numbness or tingling to the LLE.  Patient feels his bilateral lower extremity edema has improved, but is still present.    Tolerating oral intake.    Denies nausea or vomiting.  Denies chest pain or shortness of breath.  No acute events overnight.    Temp:  [97.1  F (36.2  C)-98.1  F (36.7  C)] 98.1  F (36.7  C)  Pulse:  [79-92] 82  Resp:  [16] 16  BP: (149-171)/(61-72) 171/72  SpO2:  [95 %-97 %] 96 %    Alert and oriented. NAD. Non-toxic appearing. Breathing comfortably ORA.  Left hip Aquacel dressing is clean, dry, and intact.   Minimal erythema of the surrounding skin.   Mild bilateral lower leg, ankle, and foot edema, improved from previous. Non-pitting.  Thigh is soft and compressible.  Bilateral calves are soft, non-tender.  Left lower extremity is NVI.  Patient able to resist ankle dorsiflexion and plantar flexion bilaterally.  Full AROM of toes.  DP pulse palpable.  Sensation intact bilateral lower extremities.    Labs/Imaging:  Recent Labs   Lab Test 12/18/24  0838 12/17/24  0925 12/16/24  0746 12/15/24  1440   WBC 10.4  --  8.3 9.8   HGB 10.3* 8.5* 11.1* 11.2*     --  487* 476*     Recent Labs   Lab Test 12/16/24  0746   INR 0.98     A/P    1. S/p left hip hemiarthroplasty for management of a femoral neck fracture (DOS: 12/16/24)  -Continue Lovenox 40 mg daily while inpatient for DVT prophylaxis. Plan to discharge on ASA 81 mg BID x 6 weeks.   -Monitor Hgb. Stable.  -Mobilize with PT/OT.  -WBAT LLE with walker.   -No left hip active abduction or pivoting/twisting x 8 weeks postop.   -Continue current pain " regimen.  -Dressings: Keep intact. Okay for RN to change if >60% saturated or peeling/falling off.   -Follow-up: 2 weeks post-op with Dr. Lukasz Lees/Wilbur Dang PA-C    2. Disposition  -Anticipate d/c to TCU when medically cleared and progressing in PT. Ortho stable.    Lashell Villanueva PA-C  [facility]

## 2024-12-18 NOTE — PLAN OF CARE
OT order received and chart reviewed.  Per discussion with evaluating/treating PT, discharge recommendation is for TCU.  Patient lives alone, and is A x1-2 for transfers, limited ability to take steps.  Today pt is in agreement with TCU.  Will defer skilled OT evaluation to next level of care.  Will complete orders.
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
## 2024-12-18 NOTE — PLAN OF CARE
Date & Time: 12/18/24 8192-2614  Orientation/Cognitive Concerns: A/O x4  Abnl VS/O2: VSS on RA  Tele: N/A  Pain Management: PRN tylenol, oxy, atarax  Abnl Labs/BG: , 195  Behavior/Aggression Tool Color: green  Mobility: A/1 to stand at edge of bed. Not able to ambulate with therapies today due to pain  Diet: regular  Bowel/Bladder: continent, no BM   Skin: L hip incision  Drains/Devices: PIV SL  Test/Procedures: N/A  Anticipated DC date: pending

## 2024-12-19 VITALS
RESPIRATION RATE: 18 BRPM | DIASTOLIC BLOOD PRESSURE: 64 MMHG | TEMPERATURE: 98.6 F | WEIGHT: 135 LBS | BODY MASS INDEX: 22.49 KG/M2 | SYSTOLIC BLOOD PRESSURE: 150 MMHG | HEIGHT: 65 IN | HEART RATE: 92 BPM | OXYGEN SATURATION: 96 %

## 2024-12-19 LAB
GLUCOSE BLDC GLUCOMTR-MCNC: 155 MG/DL (ref 70–99)
GLUCOSE BLDC GLUCOMTR-MCNC: 171 MG/DL (ref 70–99)
GLUCOSE BLDC GLUCOMTR-MCNC: 173 MG/DL (ref 70–99)
GLUCOSE BLDC GLUCOMTR-MCNC: 182 MG/DL (ref 70–99)
GLUCOSE BLDC GLUCOMTR-MCNC: 190 MG/DL (ref 70–99)
PATH REPORT.COMMENTS IMP SPEC: NORMAL
PATH REPORT.COMMENTS IMP SPEC: NORMAL
PATH REPORT.FINAL DX SPEC: NORMAL
PATH REPORT.GROSS SPEC: NORMAL
PATH REPORT.MICROSCOPIC SPEC OTHER STN: NORMAL
PATH REPORT.RELEVANT HX SPEC: NORMAL
PHOTO IMAGE: NORMAL

## 2024-12-19 PROCEDURE — 120N000001 HC R&B MED SURG/OB

## 2024-12-19 PROCEDURE — 250N000013 HC RX MED GY IP 250 OP 250 PS 637: Performed by: STUDENT IN AN ORGANIZED HEALTH CARE EDUCATION/TRAINING PROGRAM

## 2024-12-19 PROCEDURE — 250N000011 HC RX IP 250 OP 636: Performed by: STUDENT IN AN ORGANIZED HEALTH CARE EDUCATION/TRAINING PROGRAM

## 2024-12-19 PROCEDURE — 88300 SURGICAL PATH GROSS: CPT | Mod: 26 | Performed by: PATHOLOGY

## 2024-12-19 PROCEDURE — 99232 SBSQ HOSP IP/OBS MODERATE 35: CPT | Performed by: INTERNAL MEDICINE

## 2024-12-19 PROCEDURE — 250N000013 HC RX MED GY IP 250 OP 250 PS 637: Performed by: INTERNAL MEDICINE

## 2024-12-19 PROCEDURE — 250N000011 HC RX IP 250 OP 636: Performed by: PHYSICIAN ASSISTANT

## 2024-12-19 PROCEDURE — 250N000013 HC RX MED GY IP 250 OP 250 PS 637

## 2024-12-19 PROCEDURE — 250N000013 HC RX MED GY IP 250 OP 250 PS 637: Performed by: PHYSICIAN ASSISTANT

## 2024-12-19 PROCEDURE — 88304 TISSUE EXAM BY PATHOLOGIST: CPT | Mod: 26 | Performed by: PATHOLOGY

## 2024-12-19 RX ORDER — METOPROLOL TARTRATE 25 MG/1
25 TABLET, FILM COATED ORAL 2 TIMES DAILY
Status: DISCONTINUED | OUTPATIENT
Start: 2024-12-19 | End: 2024-12-21 | Stop reason: HOSPADM

## 2024-12-19 RX ADMIN — METHOCARBAMOL 500 MG: 500 TABLET ORAL at 14:04

## 2024-12-19 RX ADMIN — Medication 1 TABLET: at 08:26

## 2024-12-19 RX ADMIN — Medication 1 CAPSULE: at 20:24

## 2024-12-19 RX ADMIN — SENNOSIDES AND DOCUSATE SODIUM 1 TABLET: 50; 8.6 TABLET ORAL at 08:27

## 2024-12-19 RX ADMIN — INSULIN ASPART 1 UNITS: 100 INJECTION, SOLUTION INTRAVENOUS; SUBCUTANEOUS at 08:30

## 2024-12-19 RX ADMIN — OXYCODONE HYDROCHLORIDE 10 MG: 5 TABLET ORAL at 13:28

## 2024-12-19 RX ADMIN — METFORMIN HYDROCHLORIDE 1000 MG: 500 TABLET ORAL at 17:27

## 2024-12-19 RX ADMIN — LABETALOL HYDROCHLORIDE 10 MG: 5 INJECTION, SOLUTION INTRAVENOUS at 05:15

## 2024-12-19 RX ADMIN — METOPROLOL TARTRATE 12.5 MG: 25 TABLET, FILM COATED ORAL at 08:27

## 2024-12-19 RX ADMIN — INSULIN ASPART 1 UNITS: 100 INJECTION, SOLUTION INTRAVENOUS; SUBCUTANEOUS at 12:05

## 2024-12-19 RX ADMIN — ACETAMINOPHEN 975 MG: 325 TABLET, FILM COATED ORAL at 12:03

## 2024-12-19 RX ADMIN — OXYCODONE HYDROCHLORIDE 10 MG: 5 TABLET ORAL at 21:27

## 2024-12-19 RX ADMIN — AMLODIPINE BESYLATE 10 MG: 10 TABLET ORAL at 08:26

## 2024-12-19 RX ADMIN — LIDOCAINE: 50 OINTMENT TOPICAL at 08:30

## 2024-12-19 RX ADMIN — OXYCODONE HYDROCHLORIDE 10 MG: 5 TABLET ORAL at 04:54

## 2024-12-19 RX ADMIN — LIDOCAINE: 50 OINTMENT TOPICAL at 17:28

## 2024-12-19 RX ADMIN — Medication 1 CAPSULE: at 08:27

## 2024-12-19 RX ADMIN — OXYCODONE HYDROCHLORIDE 10 MG: 5 TABLET ORAL at 01:00

## 2024-12-19 RX ADMIN — INSULIN ASPART 1 UNITS: 100 INJECTION, SOLUTION INTRAVENOUS; SUBCUTANEOUS at 17:33

## 2024-12-19 RX ADMIN — OXYCODONE HYDROCHLORIDE 10 MG: 5 TABLET ORAL at 17:27

## 2024-12-19 RX ADMIN — ATORVASTATIN CALCIUM 40 MG: 40 TABLET, FILM COATED ORAL at 08:27

## 2024-12-19 RX ADMIN — HYDROXYZINE HYDROCHLORIDE 25 MG: 25 TABLET, FILM COATED ORAL at 01:00

## 2024-12-19 RX ADMIN — LIDOCAINE: 50 OINTMENT TOPICAL at 21:28

## 2024-12-19 RX ADMIN — ENOXAPARIN SODIUM 40 MG: 40 INJECTION SUBCUTANEOUS at 08:26

## 2024-12-19 RX ADMIN — HYDROXYZINE HYDROCHLORIDE 25 MG: 25 TABLET, FILM COATED ORAL at 12:03

## 2024-12-19 RX ADMIN — ACETAMINOPHEN 975 MG: 325 TABLET, FILM COATED ORAL at 04:54

## 2024-12-19 RX ADMIN — METHOCARBAMOL 500 MG: 500 TABLET ORAL at 20:24

## 2024-12-19 RX ADMIN — METOPROLOL TARTRATE 25 MG: 25 TABLET, FILM COATED ORAL at 20:24

## 2024-12-19 RX ADMIN — POLYETHYLENE GLYCOL 3350 17 G: 17 POWDER, FOR SOLUTION ORAL at 12:10

## 2024-12-19 RX ADMIN — OXYCODONE HYDROCHLORIDE 10 MG: 5 TABLET ORAL at 09:23

## 2024-12-19 RX ADMIN — SENNOSIDES AND DOCUSATE SODIUM 1 TABLET: 50; 8.6 TABLET ORAL at 20:23

## 2024-12-19 ASSESSMENT — ACTIVITIES OF DAILY LIVING (ADL)
ADLS_ACUITY_SCORE: 63
ADLS_ACUITY_SCORE: 66
ADLS_ACUITY_SCORE: 55
ADLS_ACUITY_SCORE: 66
ADLS_ACUITY_SCORE: 55
ADLS_ACUITY_SCORE: 63
ADLS_ACUITY_SCORE: 55
ADLS_ACUITY_SCORE: 66
ADLS_ACUITY_SCORE: 66
ADLS_ACUITY_SCORE: 55
ADLS_ACUITY_SCORE: 55
ADLS_ACUITY_SCORE: 66
ADLS_ACUITY_SCORE: 55
ADLS_ACUITY_SCORE: 66
ADLS_ACUITY_SCORE: 63
ADLS_ACUITY_SCORE: 66
ADLS_ACUITY_SCORE: 55
ADLS_ACUITY_SCORE: 55

## 2024-12-19 NOTE — PROGRESS NOTES
Orthopedic Surgery  Faheem Jeff  12/19/2024     Admit Date:  12/15/2024    POD: 3 Days Post-Op   Procedure(s):  LEFT HIP HEMIARTHROPLASTY    Patient sitting upright in bed eating breakfast.  Attempted to see the patient this morning but he was in the middle of receiving medications with RN at bedside.   No acute events overnight per chart review.    Temp:  [98  F (36.7  C)-98.3  F (36.8  C)] 98  F (36.7  C)  Pulse:  [89] 89  Resp:  [16-17] 17  BP: (133-191)/(64-85) 188/85  SpO2:  [95 %-98 %] 95 %    Alert and oriented. NAD. Non-toxic appearing. Breathing comfortably ORA.  Exam deferred today due to patient being busy with RN, receiving pills, and having medication questions addressed.    Labs/Imaging:  Recent Labs   Lab Test 12/18/24  0838 12/17/24  0925 12/16/24  0746 12/15/24  1440   WBC 10.4  --  8.3 9.8   HGB 10.3* 8.5* 11.1* 11.2*     --  487* 476*     Recent Labs   Lab Test 12/16/24  0746   INR 0.98     A/P    1. S/p left hip hemiarthroplasty for management of a femoral neck fracture (DOS: 12/16/24)  -Continue Lovenox 40 mg daily while inpatient for DVT prophylaxis. Plan to discharge on ASA 81 mg BID x 6 weeks.   -Monitor Hgb. Stable.  -Mobilize with PT/OT.  -WBAT LLE with walker.   -No left hip active abduction or pivoting/twisting x 8 weeks postop.   -Continue current pain regimen.  -Dressings: Keep intact. Okay for RN to change if >60% saturated or peeling/falling off.   -Follow-up: 2 weeks post-op with Dr. Lukasz Lees/Wilbur Dang PA-C    2. Disposition  -Anticipate d/c to TCU when medically cleared and progressing in PT. Ortho stable. Okay to discharge at any point once placement is found.    Lashell Villanueva PA-C  [facility]

## 2024-12-19 NOTE — PLAN OF CARE
Date/Time 12/19, 2002-4800    Trauma/Ortho/Medical (Choose one)  trauma    Diagnosis:L hip hemiarthroplasty  POD#:3  Mental Status: A&OX3, Agitated at time   Activity/dangle: Up and pivot with assist of 2 and Winifred lan  Diet: Mod cho  Pain: oxycodone, tylenol. Robaxin and atarax.   Lopez/Voiding: Urinal  02/LDA: PAWAN CARBALLO  D/C Date: Pending placement  Other Info: Elevated BP, Labetalol and hydralazine avail for SBP>180. Other vitals stable.
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
## 2024-12-19 NOTE — PLAN OF CARE
Trauma/Ortho/Medical (Choose one): Trauma     Diagnosis: (L) Hip Hemiarthroplasty  POD#: 3  Mental Status: Oriented x 4 but can be forgetful.   Activity/dangle: Up with 2/Winifred Steady  Diet: Regular  Pain: Managed with oxycodone/robaxin/atarax  Lopez/Voiding: Urinal  Tele/Restraints/Iso: None  02/LDA: RA. IV SL  D/C Date: TBD  Other Info: Is on sliding scale coverage

## 2024-12-19 NOTE — PLAN OF CARE
Date/Time: 12/18: 6014-2309    Trauma/Ortho/Medical (Choose one): Trauma    Diagnosis: (L) Hip Hemiarthroplasty  POD#: 2  Mental Status: Oriented x 4 but can be forgetful. Has been somnolent but easily arousable and rates pain 6-7 upon waking up  Activity/dangle: Up with 2/Winifred Steady  Diet: Regular  Pain: Managed with oxycodone/robaxin/atarax  Lopez/Voiding: Urinal  Tele/Restraints/Iso: None  02/LDA: AMERICA VILLALTA  D/C Date: TBD  Other Info: Is on sliding scale coverage

## 2024-12-20 ENCOUNTER — APPOINTMENT (OUTPATIENT)
Dept: PHYSICAL THERAPY | Facility: CLINIC | Age: 69
DRG: 521 | End: 2024-12-20
Payer: COMMERCIAL

## 2024-12-20 VITALS
HEIGHT: 65 IN | TEMPERATURE: 97.7 F | WEIGHT: 135 LBS | DIASTOLIC BLOOD PRESSURE: 62 MMHG | HEART RATE: 95 BPM | OXYGEN SATURATION: 97 % | RESPIRATION RATE: 16 BRPM | SYSTOLIC BLOOD PRESSURE: 142 MMHG | BODY MASS INDEX: 22.49 KG/M2

## 2024-12-20 LAB
GLUCOSE BLDC GLUCOMTR-MCNC: 141 MG/DL (ref 70–99)
GLUCOSE BLDC GLUCOMTR-MCNC: 151 MG/DL (ref 70–99)
GLUCOSE BLDC GLUCOMTR-MCNC: 185 MG/DL (ref 70–99)

## 2024-12-20 PROCEDURE — 99239 HOSP IP/OBS DSCHRG MGMT >30: CPT | Performed by: INTERNAL MEDICINE

## 2024-12-20 PROCEDURE — 250N000013 HC RX MED GY IP 250 OP 250 PS 637

## 2024-12-20 PROCEDURE — 250N000013 HC RX MED GY IP 250 OP 250 PS 637: Performed by: INTERNAL MEDICINE

## 2024-12-20 PROCEDURE — 250N000009 HC RX 250

## 2024-12-20 PROCEDURE — 97530 THERAPEUTIC ACTIVITIES: CPT | Mod: GP

## 2024-12-20 PROCEDURE — 250N000013 HC RX MED GY IP 250 OP 250 PS 637: Performed by: STUDENT IN AN ORGANIZED HEALTH CARE EDUCATION/TRAINING PROGRAM

## 2024-12-20 PROCEDURE — 250N000013 HC RX MED GY IP 250 OP 250 PS 637: Performed by: PHYSICIAN ASSISTANT

## 2024-12-20 PROCEDURE — 250N000011 HC RX IP 250 OP 636: Performed by: PHYSICIAN ASSISTANT

## 2024-12-20 RX ORDER — HYDROXYZINE HYDROCHLORIDE 25 MG/1
25 TABLET, FILM COATED ORAL 3 TIMES DAILY PRN
Qty: 45 TABLET | Refills: 0 | Status: SHIPPED | OUTPATIENT
Start: 2024-12-20 | End: 2025-01-04

## 2024-12-20 RX ORDER — AMLODIPINE BESYLATE 10 MG/1
10 TABLET ORAL DAILY
Qty: 90 TABLET | Refills: 3 | Status: SHIPPED | OUTPATIENT
Start: 2024-12-20

## 2024-12-20 RX ORDER — POLYETHYLENE GLYCOL 3350 17 G/17G
17 POWDER, FOR SOLUTION ORAL DAILY
Qty: 238 G | Refills: 0 | Status: SHIPPED | OUTPATIENT
Start: 2024-12-20 | End: 2025-01-03

## 2024-12-20 RX ORDER — AMOXICILLIN 250 MG
1 CAPSULE ORAL 2 TIMES DAILY
Qty: 60 TABLET | Refills: 0 | Status: SHIPPED | OUTPATIENT
Start: 2024-12-20 | End: 2025-01-19

## 2024-12-20 RX ORDER — METHOCARBAMOL 500 MG/1
500 TABLET, FILM COATED ORAL 4 TIMES DAILY PRN
Qty: 12 TABLET | Refills: 0 | Status: SHIPPED | OUTPATIENT
Start: 2024-12-20 | End: 2024-12-23

## 2024-12-20 RX ORDER — ACETAMINOPHEN 325 MG/1
975 TABLET ORAL EVERY 8 HOURS PRN
Qty: 90 TABLET | Refills: 0 | Status: SHIPPED | OUTPATIENT
Start: 2024-12-20 | End: 2025-01-19

## 2024-12-20 RX ADMIN — OXYCODONE HYDROCHLORIDE 10 MG: 5 TABLET ORAL at 01:32

## 2024-12-20 RX ADMIN — OXYCODONE HYDROCHLORIDE 10 MG: 5 TABLET ORAL at 16:03

## 2024-12-20 RX ADMIN — Medication 1 CAPSULE: at 08:34

## 2024-12-20 RX ADMIN — METFORMIN HYDROCHLORIDE 1000 MG: 500 TABLET ORAL at 08:33

## 2024-12-20 RX ADMIN — METOPROLOL TARTRATE 25 MG: 25 TABLET, FILM COATED ORAL at 08:35

## 2024-12-20 RX ADMIN — ENOXAPARIN SODIUM 40 MG: 40 INJECTION SUBCUTANEOUS at 08:59

## 2024-12-20 RX ADMIN — AMLODIPINE BESYLATE 10 MG: 10 TABLET ORAL at 08:34

## 2024-12-20 RX ADMIN — Medication 1 TABLET: at 08:33

## 2024-12-20 RX ADMIN — POLYETHYLENE GLYCOL 3350 17 G: 17 POWDER, FOR SOLUTION ORAL at 08:37

## 2024-12-20 RX ADMIN — LIDOCAINE: 50 OINTMENT TOPICAL at 08:45

## 2024-12-20 RX ADMIN — METHOCARBAMOL 500 MG: 500 TABLET ORAL at 13:23

## 2024-12-20 RX ADMIN — SENNOSIDES AND DOCUSATE SODIUM 2 TABLET: 50; 8.6 TABLET ORAL at 08:36

## 2024-12-20 RX ADMIN — OXYCODONE HYDROCHLORIDE 10 MG: 5 TABLET ORAL at 05:31

## 2024-12-20 RX ADMIN — OXYCODONE HYDROCHLORIDE 10 MG: 5 TABLET ORAL at 11:12

## 2024-12-20 RX ADMIN — ATORVASTATIN CALCIUM 40 MG: 40 TABLET, FILM COATED ORAL at 08:33

## 2024-12-20 RX ADMIN — HYDROXYZINE HYDROCHLORIDE 25 MG: 25 TABLET, FILM COATED ORAL at 08:33

## 2024-12-20 RX ADMIN — METHOCARBAMOL 500 MG: 500 TABLET ORAL at 05:31

## 2024-12-20 ASSESSMENT — ACTIVITIES OF DAILY LIVING (ADL)
ADLS_ACUITY_SCORE: 64
ADLS_ACUITY_SCORE: 63
ADLS_ACUITY_SCORE: 66
ADLS_ACUITY_SCORE: 63
ADLS_ACUITY_SCORE: 64
ADLS_ACUITY_SCORE: 66
ADLS_ACUITY_SCORE: 66
ADLS_ACUITY_SCORE: 65
ADLS_ACUITY_SCORE: 58
ADLS_ACUITY_SCORE: 57
ADLS_ACUITY_SCORE: 63
ADLS_ACUITY_SCORE: 64
ADLS_ACUITY_SCORE: 63
ADLS_ACUITY_SCORE: 66
ADLS_ACUITY_SCORE: 57
ADLS_ACUITY_SCORE: 57
ADLS_ACUITY_SCORE: 66
ADLS_ACUITY_SCORE: 66
ADLS_ACUITY_SCORE: 64
ADLS_ACUITY_SCORE: 66
ADLS_ACUITY_SCORE: 65

## 2024-12-20 NOTE — PROGRESS NOTES
"Date & Time: 12/19/24 1900-0730  Summary: POD # 4 Left hip hemiarthroplasty  Orientation/Cognitive: A&Ox3-4, easily forgetful and [place]  Mobility Level/Assist Equipment: A1//GB/walker, SBA with urina use at bedside. A2 with oliva steady to the bathroom  Fall Risk (Y/N): Yes  Behavior Concerns: Green, patient is usually frustrated and anxious about his pin tolerance  Pain Management: PRN oxycodone, Robaxin & Atarax   Tele/VS/O2: VSS on RA  ABNL Lab/BG: , 171, & 185, Hgb 10.3  Diet: Regular   Bowel/Bladder: Continent of bowel and bladder, usus urinal at bedside  Skin Concerns: Left hip surgical incision site with dressing CDI. BLE mild (+2) edema  Drains/Devices: R PIV SL  Tests/Procedures for next shift: None  Anticipated DC date & active delays: TBD  Other Important Information: PT/OT & SW following. Patient frequently seek out staff for pain medication. BG 4 times daily with insulin sliding scale. CMS Q4H      BP (!) 150/64 (BP Location: Left arm, Patient Position: Fowlers, Cuff Size: Adult Regular)   Pulse 92   Temp 98.6  F (37  C) (Oral)   Resp 18   Ht 1.651 m (5' 5\")   Wt 61.2 kg (135 lb)   SpO2 96%   BMI 22.47 kg/m        "

## 2024-12-20 NOTE — DISCHARGE INSTRUCTIONS
Rehab Discharge Recommendations  Timeline Transportation Anticipated    12/17 0800  public transportation   PT Discharge Recommendation (DC Rec) (Last 1 values)    12/20 1341  home with home care physical therapy   PT Rationale for DC Rec (Last 1 values)    12/20 1341  Pt is below baseline. But improvement in mobility compared to previous sessions. Currently able to complete transfers and short distance ambulation w/ FWW and SBA. Pt presents with deficits in activity tolerance, balance, and strength. Pt adament about DCing home and declining PT. Reports has a friend that can assist w/ ADLs and IADLs. Anticipate Pt can DC to home w/ assist from friend for ADLs and HHPT to improve upon functional mobility and strengthening. Recommend Pt use a FWW for all transfers and order placed in chart. Pt agreeable.

## 2024-12-20 NOTE — PROGRESS NOTES
Orthopedic Surgery  Faheem Jeff  12/20/2024     Admit Date:  12/15/2024    POD: 4 Days Post-Op   Procedure(s):  LEFT HIP HEMIARTHROPLASTY    Patient sitting upright in bed. Agitated and slightly confused this morning.  Patient endorses discomfort to the left hip.  Denies numbness and tingling to the left leg.  Swelling persistent to the bilateral lower legs and feet.   No nausea, vomiting, CP, or SOB.  Tolerating PO intake.    Temp:  [98.6  F (37  C)-98.8  F (37.1  C)] 98.8  F (37.1  C)  Pulse:  [82-97] 82  Resp:  [16-18] 16  BP: (138-172)/(64-79) 138/64  SpO2:  [96 %-97 %] 97 %    Alert and oriented but confused at times. Agitated. NAD. Non-toxic appearing. Breathing comfortably ORA.  Left hip Aquacel dressing with ~25% strikethrough and peeling distally. Order placed for RN to change.  Minimal erythema of the surrounding skin.   Mild bilateral lower leg, ankle, and foot edema, improved from previous. Non-pitting.  Thigh is soft and compressible.  Bilateral calves are soft, non-tender.  Left lower extremity is NVI.  Patient able to resist ankle dorsiflexion and plantar flexion bilaterally.  Full AROM of toes.  DP pulse palpable.  Sensation intact bilateral lower extremities.    Labs/Imaging:  Recent Labs   Lab Test 12/18/24  0838 12/17/24  0925 12/16/24  0746 12/15/24  1440   WBC 10.4  --  8.3 9.8   HGB 10.3* 8.5* 11.1* 11.2*     --  487* 476*     Recent Labs   Lab Test 12/16/24  0746   INR 0.98     A/P    1. S/p left hip hemiarthroplasty for management of a femoral neck fracture (DOS: 12/16/24)  -Continue Lovenox 40 mg daily while inpatient for DVT prophylaxis. Plan to discharge on ASA 81 mg BID x 6 weeks.   -Monitor Hgb. Stable.  -Mobilize with PT/OT.  -WBAT LLE with walker.   -No left hip active abduction or pivoting/twisting x 8 weeks postop.   -Continue current pain regimen.  -Dressings: Keep intact. Okay for RN to change if >60% saturated or peeling/falling off.   -Follow-up: 2 weeks post-op with   Lukasz Lees/Wilbur Dang PA-C    2. Disposition  -Anticipate d/c to TCU when medically cleared and progressing in PT. Ortho stable. Ortho team will sign off at this time, but will see the patient on POD#14 if still inpatient. Otherwise, he may follow up with Dr. Lees's team as above at that time.    Lashell Villanueva PA-C  [facility]
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
## 2024-12-20 NOTE — PROGRESS NOTES
Date & Time: 12/20//24 5173-9119  Summary: POD # 4 Left hip hemiarthroplasty  Orientation/Cognitive: A&Ox2; forgetful; disoriented to time and situation. [place]  Mobility Level/Assist Equipment: A1/GB/walker, SBA with urinal use at bedside.   Fall Risk (Y/N): Yes  Behavior Concerns: Green, patient is usually frustrated and anxious about his pain tolerance/ medication schedule   Pain Management: PRN oxycodone, Robaxin & Atarax   Tele/VS/O2: VSS except for HTN. on RA  ABNL Lab/BG: ,151 ( see other labs in chart)   Diet: Regular   Bowel/Bladder: Continent of bowel and bladder, uses urinal at bedside  Skin Concerns: dressing on Left hip incision with small amount of drainage/ aquacel dressing changed as ordered. BLE mild (+2) edema  Drains/Devices: PIV dc'd   Tests/Procedures for next shift: None  Anticipated DC date & active delays: Today- home with home care (Pt declined TCU). wheelchair ride scheduled today between 2724-1040   Other Important Information:  Patient frequently seeks out staff for pain medication. BGM 4 times daily with insulin sliding scale but refuses insulin.

## 2024-12-20 NOTE — PROGRESS NOTES
Care Management Discharge Note    Discharge Date: 12/20/2024       Discharge Disposition:  (tcu)    Discharge Services:      Discharge DME:      Discharge Transportation: public transportation    Private pay costs discussed: private room/amenity fees and transportation costs    Does the patient's insurance plan have a 3 day qualifying hospital stay waiver?  No    PAS Confirmation Code: [id number]  Patient/family educated on Medicare website which has current facility and service quality ratings:      Education Provided on the Discharge Plan:    Persons Notified of Discharge Plans: Faheem  Patient/Family in Agreement with the Plan:      Handoff Referral Completed: No, handoff not indicated or clinically appropriate    Additional Information:  Patient accepted to [facility] [place] today if he's ready. Reviewed chart and it appears that he is medically ready. Message to Dr. Vargas to confirm. Tentative wheelchair ride scheduled today between 9619-0472 as this was first available. PAS completed.    PAS-RR    D: Per DHS regulation, SW completed and submitted PAS-RR to [place] [employer] [place] Direct Connect via the Senior LinkAge Line.  PAS-RR confirmation # is : [id number]    I: SW spoke with patient and they are aware a PAS-RR has been submitted.  SW reviewed with patient that they may be contacted for a follow up appointment within 10 days of hospital discharge if their SNF stay is < 30 days.  Contact information for Senior LinkAge Line was also provided.    A: patient verbalized understanding.    P: Further questions may be directed to [facility] LinkAge Line at #[phone], option #4 for PAS-RR staff.    Writer met with patient to update. He says that he now wishes to discharge home. He says his neighbor can assist him where needed and his scooter is at home. He clarified that prior to this hospitalization, he was using that for his mobility. He could slide from his chair to the scooter and would use that to move around. He  said that he's open to home care coming into the home and would like to discharge today. Writer updated provider and sent home care referral to see if an agency would be able to accept him.     1300: Writer informed by therapy that they will send a walker home with patient and he can discharge home with home care. Writer updated provider and patient. Call to  [employer] and destination of ride changed to patient's home address on [address]. Confirmed he has his keys and there is an elevator. Updated Emy via message that discharge plan has changed.    [facility] accepted patient for home PT/OT.     SIVA Singh

## 2024-12-21 NOTE — PLAN OF CARE
Physical Therapy Discharge Summary    Reason for therapy discharge:    Discharged to home with home therapy.    Progress towards therapy goal(s). See goals on Care Plan in [facility] electronic health record for goal details.  Goals not met.  Barriers to achieving goals:   discharge from facility.    Therapy recommendation(s):    Continued therapy is recommended.  Rationale/Recommendations:  To progress strength, activity tolerance and independence with mobility.

## 2024-12-22 ENCOUNTER — MEDICAL CORRESPONDENCE (OUTPATIENT)
Dept: HEALTH INFORMATION MANAGEMENT | Facility: CLINIC | Age: 69
End: 2024-12-22

## 2024-12-23 ENCOUNTER — PATIENT OUTREACH (OUTPATIENT)
Dept: CARE COORDINATION | Facility: CLINIC | Age: 69
End: 2024-12-23
Payer: COMMERCIAL
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
## 2024-12-23 NOTE — LETTER
[facility]   To:   [facility] SW          Please give to facility    From:   Cynthia Torrez  [facility]  Care Coordinator   [facility]   P: [phone]   [email or web address]   Patient Name:  Faheem Jeff YOB: 1955   Admit date: 12/20/24      *Information Needed:  Please contact me when the patient will discharge (or if they will move to long term care)- include the discharge date, disposition, and main diagnosis   If the patient is discharged with home care services, please provide the name of the agency    Also- Please inform me if a care conference is being held.   Phone, Fax or Email with information                              Thank you

## 2024-12-29 DIAGNOSIS — Z79.4 TYPE 2 DIABETES MELLITUS WITHOUT COMPLICATION, WITH LONG-TERM CURRENT USE OF INSULIN (H): ICD-10-CM

## 2024-12-29 DIAGNOSIS — E11.9 TYPE 2 DIABETES MELLITUS WITHOUT COMPLICATION, WITH LONG-TERM CURRENT USE OF INSULIN (H): ICD-10-CM

## 2024-12-31 DIAGNOSIS — E11.9 TYPE 2 DIABETES MELLITUS WITHOUT COMPLICATION, WITH LONG-TERM CURRENT USE OF INSULIN (H): ICD-10-CM

## 2024-12-31 DIAGNOSIS — Z79.4 TYPE 2 DIABETES MELLITUS WITHOUT COMPLICATION, WITH LONG-TERM CURRENT USE OF INSULIN (H): ICD-10-CM

## 2025-01-03 DIAGNOSIS — S72.002A CLOSED FRACTURE OF NECK OF LEFT FEMUR, INITIAL ENCOUNTER (H): ICD-10-CM

## 2025-01-03 RX ORDER — OXYCODONE HYDROCHLORIDE 5 MG/1
5-10 TABLET ORAL EVERY 4 HOURS PRN
Qty: 25 TABLET | Refills: 0 | OUTPATIENT
Start: 2025-01-03

## 2025-01-03 NOTE — TELEPHONE ENCOUNTER
Patient is at home now per home care nurse. He is having a lot of post-operative pain. Requesting pain medication so he can get his scooter to [facility] to pay a phone bill.     Esmer Sanders RN

## 2025-01-03 NOTE — TELEPHONE ENCOUNTER
Routing refill request to provider for review/approval because:  Drug not on the [facility] refill protocol   Patient is out of roxicodone. Patient is able to come to appointment at this time.   Home care nurse states that patient is 2 weeks post op from left hip surgery. Patient's wound looks good. Wound still with anson.   Yarelis Flores RN

## 2025-01-06 RX ORDER — OXYCODONE HYDROCHLORIDE 5 MG/1
5-10 TABLET ORAL EVERY 6 HOURS PRN
Qty: 25 TABLET | Refills: 0 | Status: SHIPPED | OUTPATIENT
Start: 2025-01-06

## 2025-01-07 ENCOUNTER — MEDICAL CORRESPONDENCE (OUTPATIENT)
Dept: HEALTH INFORMATION MANAGEMENT | Facility: CLINIC | Age: 70
End: 2025-01-07

## 2025-01-07 DIAGNOSIS — Z53.9 DIAGNOSIS NOT YET DEFINED: Primary | ICD-10-CM

## 2025-01-07 PROCEDURE — G0179 MD RECERTIFICATION HHA PT: HCPCS | Performed by: NURSE PRACTITIONER

## 2025-01-21 DIAGNOSIS — S72.002A CLOSED FRACTURE OF NECK OF LEFT FEMUR, INITIAL ENCOUNTER (H): ICD-10-CM

## 2025-01-22 RX ORDER — OXYCODONE HYDROCHLORIDE 5 MG/1
5 TABLET ORAL EVERY 6 HOURS PRN
Qty: 12 TABLET | Refills: 0 | Status: SHIPPED | OUTPATIENT
Start: 2025-01-22

## 2025-01-23 ENCOUNTER — PATIENT OUTREACH (OUTPATIENT)
Dept: CARE COORDINATION | Facility: CLINIC | Age: 70
End: 2025-01-23
Payer: COMMERCIAL

## 2025-01-23 NOTE — PROGRESS NOTES
Clinic Care Coordination Contact  [facility]/Voicemail    Clinical Data: Care Coordinator Outreach    Outreach Documentation Number of Outreach Attempt   1/23/2025  10:14 AM 1       Left message on patient's voicemail with call back information and requested return call.      Plan: Care Coordinator will send care coordination introduction letter with care coordinator contact information and explanation of care coordination services via mail. Care Coordinator will try to reach patient again in 1-2 business days.    Cynthia Torrez  [facility]  Clinic Care Coordinator  [facility]  [phone]  [email or web address]

## 2025-01-23 NOTE — LETTER
[facility] CARE COORDINATION  [address]  [place] [address]    January 23, 2025    Faheem Jeff  [address]   [place] [place] [address]      Dear Faheem,    I am a clinic care coordinator who works with DAVID Grimaldo CNP with the [facility]. I wanted to introduce myself and provide you with my contact information for you to be able to call me with any questions or concerns. Below is a description of clinic care coordination and how I can further assist you.       The clinic care coordination team is made up of a registered nurse, , financial resource worker and community health worker who understand the health care system. The goal of clinic care coordination is to help you manage your health and improve access to the health care system. Our team works alongside your provider to assist you in determining your health and social needs. We can help you obtain health care and community resources, providing you with necessary information and education. We can work with you through any barriers and develop a care plan that helps coordinate and strengthen the communication between you and your care team.  Our services are voluntary and are offered without charge to you personally.    Please feel free to contact me with any questions or concerns regarding care coordination and what we can offer.      We are focused on providing you with the highest-quality healthcare experience possible.    Sincerely,     Cynthia Torrez, [facility]  Clinic Care Coordinator  [facility]  [phone]

## 2025-01-30 ENCOUNTER — MEDICAL CORRESPONDENCE (OUTPATIENT)
Dept: HEALTH INFORMATION MANAGEMENT | Facility: CLINIC | Age: 70
End: 2025-01-30
Payer: COMMERCIAL

## 2025-02-03 ENCOUNTER — MEDICAL CORRESPONDENCE (OUTPATIENT)
Dept: HEALTH INFORMATION MANAGEMENT | Facility: CLINIC | Age: 70
End: 2025-02-03
Payer: COMMERCIAL

## 2025-02-24 ENCOUNTER — TELEPHONE (OUTPATIENT)
Dept: INTERNAL MEDICINE | Facility: CLINIC | Age: 70
End: 2025-02-24
Payer: COMMERCIAL

## 2025-02-24 NOTE — TELEPHONE ENCOUNTER
Home Care is calling regarding an established patient with [facility].  Requesting orders from: Emy Shaver  RN APPROVED: RN able to provide verbal orders.  Home Care will send orders for signature.  RN will close encounter.  Is this a request for a temporary pause in the home care episode?  No    Orders Requested    Physical Therapy  Request for initial certification (first set of orders)   Frequency: 2 times per week x 4 weeks  RN gave verbal order: Yes      Lauren Sanders RN    
Abdominal Pain, N/V/D

## 2025-03-04 ENCOUNTER — MEDICAL CORRESPONDENCE (OUTPATIENT)
Dept: HEALTH INFORMATION MANAGEMENT | Facility: CLINIC | Age: 70
End: 2025-03-04

## 2025-03-04 ENCOUNTER — DOCUMENTATION ONLY (OUTPATIENT)
Dept: AUDIOLOGY | Facility: CLINIC | Age: 70
End: 2025-03-04
Payer: COMMERCIAL

## 2025-03-04 DIAGNOSIS — H90.3 SENSORINEURAL HEARING LOSS, BILATERAL: Primary | ICD-10-CM

## 2025-03-04 NOTE — PROGRESS NOTES
Patient mailed 24 size 13 batteries and wax filters per patient request.     Stefany Romero. CCC-A  Licensed Audiologist   [place] #[id number]

## 2025-03-31 ENCOUNTER — OFFICE VISIT (OUTPATIENT)
Dept: AUDIOLOGY | Facility: CLINIC | Age: 70
End: 2025-03-31
Payer: COMMERCIAL

## 2025-03-31 DIAGNOSIS — H90.3 SENSORINEURAL HEARING LOSS, BILATERAL: Primary | ICD-10-CM

## 2025-03-31 NOTE — PROGRESS NOTES
AUDIOLOGY REPORT    SUBJECTIVE: Faheem Jeff is a 69 year old male who was seen in the Audiology Clinic at [facility] on 3/31/2025 for a hearing aid check. Previous results have revealed moderate to severe sensorineural hearing loss with 20% word recognition for the right ear and moderate to moderately severe sensorineural hearing loss with 76% word recognition for the left ear. The patient has been seen previously in this clinic and was fit with a left Phonak Virto B70-13 hearing aid and Phonak CROS B-13 transmitter on 9/22/2020. The left hearing aid was replaced on 6/12/2023 and then again on 3/15/2024. Today, Faheem reports that he has lost his CROS transmitter a while ago and that the left hearing aid is broken.    OBJECTIVE: Visual inspection shows that the faceplate has come detached from the rest of the hearing aid and a wire appears to be broken. It was reviewed that it will need to go into the company for repair. He expressed understanding.  He requested a call and MyChart when it returns from repair.    It was reviewed that he is not eligible for a new CROS until later this year as insurance covers devices once per ear every 5 years. He expressed understanding.    Today's appointment is a no charge visit as no billable service was provided.    ASSESSMENT: A hearing aid check was completed today.    PLAN: Patient's left hearing aid is broke and being sent to the  for repair. He will be contact via phone and [employer] when it returns. Please call this clinic with any questions regarding today s appointment.      Romi Hager, [place]  Audiologist  [place] License  #[id number]

## 2025-04-01 ENCOUNTER — MEDICAL CORRESPONDENCE (OUTPATIENT)
Dept: HEALTH INFORMATION MANAGEMENT | Facility: CLINIC | Age: 70
End: 2025-04-01

## 2025-04-07 ENCOUNTER — MEDICAL CORRESPONDENCE (OUTPATIENT)
Dept: HEALTH INFORMATION MANAGEMENT | Facility: CLINIC | Age: 70
End: 2025-04-07
Payer: COMMERCIAL

## 2025-04-10 ENCOUNTER — DOCUMENTATION ONLY (OUTPATIENT)
Dept: AUDIOLOGY | Facility: CLINIC | Age: 70
End: 2025-04-10
Payer: COMMERCIAL

## 2025-04-10 DIAGNOSIS — H90.3 SENSORINEURAL HEARING LOSS, BILATERAL: Primary | ICD-10-CM

## 2025-04-23 ENCOUNTER — OFFICE VISIT (OUTPATIENT)
Dept: URGENT CARE | Facility: URGENT CARE | Age: 70
End: 2025-04-23
Payer: COMMERCIAL

## 2025-04-23 VITALS
RESPIRATION RATE: 18 BRPM | DIASTOLIC BLOOD PRESSURE: 60 MMHG | SYSTOLIC BLOOD PRESSURE: 158 MMHG | TEMPERATURE: 97.2 F | WEIGHT: 135 LBS | BODY MASS INDEX: 22.47 KG/M2 | HEART RATE: 89 BPM | OXYGEN SATURATION: 100 %

## 2025-04-23 DIAGNOSIS — M54.12 LEFT CERVICAL RADICULOPATHY: Primary | ICD-10-CM

## 2025-04-23 DIAGNOSIS — Z53.20 PATIENT LEFT BEFORE TREATMENT COMPLETED: ICD-10-CM

## 2025-04-23 PROCEDURE — 3077F SYST BP >= 140 MM HG: CPT | Performed by: PHYSICIAN ASSISTANT

## 2025-04-23 PROCEDURE — 99214 OFFICE O/P EST MOD 30 MIN: CPT | Performed by: PHYSICIAN ASSISTANT

## 2025-04-23 PROCEDURE — 3078F DIAST BP <80 MM HG: CPT | Performed by: PHYSICIAN ASSISTANT

## 2025-04-23 RX ORDER — PREDNISONE 20 MG/1
40 TABLET ORAL DAILY
Qty: 10 TABLET | Refills: 0 | Status: SHIPPED | OUTPATIENT
Start: 2025-04-23 | End: 2025-04-28

## 2025-04-23 RX ORDER — ASPIRIN 81 MG/1
TABLET, CHEWABLE ORAL
COMMUNITY
Start: 2025-02-04
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
## 2025-05-06 ENCOUNTER — MEDICAL CORRESPONDENCE (OUTPATIENT)
Dept: HEALTH INFORMATION MANAGEMENT | Facility: CLINIC | Age: 70
End: 2025-05-06
Payer: COMMERCIAL

## 2025-05-08 ENCOUNTER — TELEPHONE (OUTPATIENT)
Dept: INTERNAL MEDICINE | Facility: CLINIC | Age: 70
End: 2025-05-08
Payer: COMMERCIAL

## 2025-05-08 NOTE — TELEPHONE ENCOUNTER
Forms/Letter Request    Type of form/letter: OTHER: Medical Assistance       Do we have the form/letter: Yes:     Who is the form from? [place] [facility]    Where did/will the form come from? form was mailed in    When is form/letter needed by: asap    How would you like the form/letter returned: Fax : [phone]    Patient Notified form requests are processed in 5-7 business days:N/A    Could we send this information to you in [employer] or would you prefer to receive a phone call?:   No preference   Okay to leave a detailed message?: No.    Put forms in PCP Juanis BARFIELD   Labs/Medications
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
## 2025-05-10 ENCOUNTER — HEALTH MAINTENANCE LETTER (OUTPATIENT)
Age: 70
End: 2025-05-10

## 2025-06-02 ENCOUNTER — MEDICAL CORRESPONDENCE (OUTPATIENT)
Dept: HEALTH INFORMATION MANAGEMENT | Facility: CLINIC | Age: 70
End: 2025-06-02
Payer: COMMERCIAL

## 2025-06-16 NOTE — PROGRESS NOTES
"[facility] Initial Evaluation  Subjective:  The history is provided by the patient. No  was used.   Faheem Jeff being seen for lumbar pain secondary to \"degenerative disc disease.\" Also, coming in for neck pain secondary to a \"pinched nerve.\" Reports he has stenosis in his neck. States that he is not active and spends most of his time lying down because sitting and standing for long periods of time are painful. Pain has been around for many years and nothing seems to make it better. Has been to multiple pain specialists without improvement.  Rides a scooter almost everywhere with the occasional use of a cane. .   Problem began 12/5/2019. Problem occurred: gradual onset.  and reported as 9/10 on pain scale. General health as reported by patient is fair. Pertinent medical history includes:  High blood pressure, heart problems, osteoarthritis, numbness/tingling, diabetes, cancer, history of fractures, sleep disorder/apnea and smoking.  Medical allergies: other. Other medical allergies details: Meclazine.  Surgeries include:  Other. Other surgery history details: Bladder resections.  Current medications:  Cardiac medication, sleep medication and high blood pressure medication.   Primary job tasks include:  Prolonged standing, prolonged sitting, lifting/carrying, pushing/pulling and repetitive tasks.  Pain is described as sharp and shooting and is intermittent. Pain is worse during the day. Since onset symptoms are gradually worsening. Special tests:  X-ray (Imaging revealed DJD in spine and stenosis in neck). Previous treatment includes physical therapy and chiropractic. There was mild improvement following previous treatment.   Patient is Disabled/Previously a .   Barriers include:  None as reported by patient.  Red flags:  Calf pain-swelling-warmth and chest pain (Calf pain manifests as cramping in the middle of the night. No current chest pain but has had pain in the " "past. ).  Type of problem:  Lumbar   Condition occurred with:  Degenerative joint disease and repetition/overuse. This is a chronic condition    Patient reports pain:  Central lumbar spine, lumbar spine right and lumbar spine left. Radiates to:  Lower leg right and lower leg left. Associated symptoms:  Loss of balance, loss of motion, loss of motion/stiffness and loss of strength (Pt reports \"shooting\" pain into the lower extremities but denies tingling, numbness, and burning sensations into either leg). Symptoms are exacerbated by walking, sitting, certain positions, standing, lifting, stress, lying down, twisting and bending and relieved by rest.                      Objective:    Gait:  Pt rides a scooter mostly to get around. States that he does not walk much anymore because he is afraid of the pain. Has a SEC that is available for use around the house or for short distances.    Gait Type:  Antalgic   Weight Bearing Status:  WBAT   Assistive Devices:  Cane                 Lumbar/SI Evaluation  ROM:    AROM Lumbar:   Flexion:            To mid-shin, no pain. Pt states this is a relieving position  Ext:                    10%, increased pain   Side Bend:        Left:  To upper thigh, painful    Right:  To mid thigh, no pain  Rotation:           Left:  Painful with rotation    Right:  Painful with rotation  Side Glide:        Left:     Right:           Lumbar Myotomes:    T12-L3 (Hip Flex):  Left: 4+    Right: 4+  L2-4 (Quads):  Left:  5      L4 (Ankle DF):  Left:  4+    Right:  4+  L5 (Great Toe Ext): Left: 5    Right: 5   S1 (Toe Raise):  Left: 4+    Right: 4+  Lumbar DTR's:  not assessed      Cord Signs:  not assessed    Lumbar Dermtomes:  normal (Pt able to sense light touch in bilateral LEs in all dermatomes)                  Lumbar Palpation:    Tenderness present at Left:    Quadratus Lumborum; Piriformis; Gluteus Medius and Greater Trochanter  Tenderness not present at Left:    Erector Spinae or " Hamstrings  Tenderness present at Right: Quadratus Lumborum; Piriformis; Gluteus Medius and Greater Trochanter  Tenderness not present at Right:  Erector Spinae or Hamstrings  Functional Tests:  Core strength and proprioception lumbar: Sit to stand: Pt fatigues after completing 5 from  standard chair height.        Lumbar Provocation:      Left negative with:  PROM hip    Right negative with:  PROM hip                                                 General     ROS    Assessment/Plan:    Patient is a 64 year old male with cervical and lumbar complaints.    Patient has the following significant findings with corresponding treatment plan.                Diagnosis 1:  Neck pain, needs further evaluation  Pain -  hot/cold therapy, US, electric stimulation, manual therapy, self management, education and home program  Decreased ROM/flexibility - manual therapy, therapeutic exercise and home program  Decreased joint mobility - manual therapy and therapeutic exercise  Decreased strength - therapeutic exercise, therapeutic activities and home program  Impaired muscle performance - neuro re-education  Decreased function - therapeutic activities and home program  Impaired posture - neuro re-education and home program  Diagnosis 2:  Low back pain consistent with stenosis and generalized weakness   Pain -  hot/cold therapy, US, electric stimulation, mechanical traction, manual therapy, self management, education, directional preference exercise and home program  Decreased ROM/flexibility - manual therapy, therapeutic exercise and home program  Decreased joint mobility - manual therapy, therapeutic exercise and home program  Decreased strength - therapeutic exercise, therapeutic activities and home program  Impaired gait - gait training and home program  Impaired muscle performance - neuro re-education and home program  Decreased function - therapeutic activities and home program  Impaired posture - neuro re-education and home  program    Therapy Evaluation Codes:   1) History comprised of:   Personal factors that impact the plan of care:      Age, Coping style, Past/current experiences, Time since onset of symptoms and Work status.    Comorbidity factors that impact the plan of care are:      Cancer, Chest pain, Pain at night/rest, Smoking and Weakness.     Medications impacting care: None.  2) Examination of Body Systems comprised of:   Body structures and functions that impact the plan of care:      Cervical spine and Lumbar spine.   Activity limitations that impact the plan of care are:      Bathing, Bending, Driving, Dressing, Lifting, Reading/Computer work, Sitting, Squatting/kneeling, Stairs, Standing, Walking, Working, Sleeping and Laying down.  3) Clinical presentation characteristics are:   Stable/Uncomplicated.  4) Decision-Making    Moderate complexity using standardized patient assessment instrument and/or measureable assessment of functional outcome.  Cumulative Therapy Evaluation is: Moderate complexity.    Previous and current functional limitations:  (See Goal Flow Sheet for this information)    Short term and Long term goals: (See Goal Flow Sheet for this information)     Communication ability:  Patient appears to be able to clearly communicate and understand verbal and written communication and follow directions correctly.  Treatment Explanation - The following has been discussed with the patient:   RX ordered/plan of care  Anticipated outcomes  Possible risks and side effects  This patient would benefit from PT intervention to resume normal activities.   Rehab potential is fair.    Frequency:  1 X week, once daily  Duration:  for 8 weeks  Discharge Plan:  Achieve all LTG.  Independent in home treatment program.  Reach maximal therapeutic benefit.    Please refer to the daily flowsheet for treatment today, total treatment time and time spent performing 1:1 timed codes.        This document is complete and the patient is ready for discharge.

## 2025-06-23 ENCOUNTER — TELEPHONE (OUTPATIENT)
Dept: AUDIOLOGY | Facility: CLINIC | Age: 70
End: 2025-06-23
Payer: COMMERCIAL

## 2025-06-23 NOTE — TELEPHONE ENCOUNTER
[facility] Call Center    Phone Message    May a detailed message be left on voicemail: yes     Reason for Call: Other: Per pt he is in need of batteries and wax filters. Please mail them out to him Thank you I did confirm his address. Thank you     Action Taken: Message routed to:  [facility]): AUDIO    Travel Screening: Not Applicable     Date of Service:

## 2025-07-03 NOTE — TELEPHONE ENCOUNTER
[facility] Call Center    Phone Message    May a detailed message be left on voicemail: yes     Reason for Call: Other: Pt is interested in cross over hearing aids and would like to discuss them with an Audiologist further     Action Taken: Other: [facility] Audio    Travel Screening: Not Applicable     Date of Service:

## 2025-07-08 ENCOUNTER — PATIENT OUTREACH (OUTPATIENT)
Dept: GERIATRIC MEDICINE | Facility: CLINIC | Age: 70
End: 2025-07-08
Payer: COMMERCIAL

## 2025-07-08 NOTE — PROGRESS NOTES
[facility] Care Coordination Contact    Member became effective with  [facility] on 07/01/2025 with [facility].  Previous Health Plan: [facility]  Previous Care System: [facility]  Previous care coordinators name and number: RAYA DELVIN Ronquillo Type: CADKRISTEN  UTF received: Yes: Received and saved to member file  Mailed welcome letter and [facility] When to Contact Your Care Coordinator  Address/Phone discrepancy: N/A  [place] [employer] and fully prepped for visit as member new to [facility]/[employer] plan.    Anita Beltran  Care Management Specialist  [facility]  [phone]

## 2025-07-08 NOTE — LETTER
July 8, 2025    FAHEEM HERNANDEZ  [address]   [place] [address]      Dear Faheem:    As a member of [facility] ([facility] [place] [place]) ([facility]), you are provided a care coordinator. I will be your new care coordinator as of 07/01/2025. I will be calling you soon to see how you are doing and determine your needs.    If you have any questions, please feel free to call me at [phone]. If you reach my voice mail, please leave a message and your phone number. If you are hearing impaired, please call the [place] Relay at 201 or [phone] ([email or web address] relay service).    I look forward to speaking with you soon.    Sincerely,    Jennifer Vo RN, BSN, N  [phone]  [email or web address] is a health plan that contracts with both Medicare and the [place] Medical Assistance (Medicaid) program to provide benefits of both programs to enrollees. Enrollment in [facility] depends on contract renewal.      [facility] [place] [place]  [id number] DHS Approved ([id number])  [id number] (11/18)

## 2025-07-09 ENCOUNTER — DOCUMENTATION ONLY (OUTPATIENT)
Dept: AUDIOLOGY | Facility: CLINIC | Age: 70
End: 2025-07-09
Payer: COMMERCIAL

## 2025-07-09 DIAGNOSIS — H90.3 SENSORINEURAL HEARING LOSS, BILATERAL: Primary | ICD-10-CM

## 2025-07-11 ENCOUNTER — MEDICAL CORRESPONDENCE (OUTPATIENT)
Dept: HEALTH INFORMATION MANAGEMENT | Facility: CLINIC | Age: 70
End: 2025-07-11
Payer: COMMERCIAL

## 2025-07-30 ENCOUNTER — PATIENT OUTREACH (OUTPATIENT)
Dept: GERIATRIC MEDICINE | Facility: CLINIC | Age: 70
End: 2025-07-30
Payer: COMMERCIAL

## 2025-08-11 DIAGNOSIS — Z79.4 TYPE 2 DIABETES MELLITUS WITHOUT COMPLICATION, WITH LONG-TERM CURRENT USE OF INSULIN (H): ICD-10-CM

## 2025-08-11 DIAGNOSIS — E11.9 TYPE 2 DIABETES MELLITUS WITHOUT COMPLICATION, WITH LONG-TERM CURRENT USE OF INSULIN (H): ICD-10-CM

## 2025-08-13 ENCOUNTER — MEDICAL CORRESPONDENCE (OUTPATIENT)
Dept: HEALTH INFORMATION MANAGEMENT | Facility: CLINIC | Age: 70
End: 2025-08-13
Payer: COMMERCIAL

## 2025-08-15 ENCOUNTER — OFFICE VISIT (OUTPATIENT)
Dept: AUDIOLOGY | Facility: CLINIC | Age: 70
End: 2025-08-15
Payer: COMMERCIAL

## 2025-08-15 DIAGNOSIS — H90.3 SNHL (SENSORY-NEURAL HEARING LOSS), ASYMMETRICAL: Primary | ICD-10-CM

## 2025-08-25 ENCOUNTER — OFFICE VISIT (OUTPATIENT)
Dept: AUDIOLOGY | Facility: CLINIC | Age: 70
End: 2025-08-25
Payer: COMMERCIAL

## 2025-08-25 ENCOUNTER — TELEPHONE (OUTPATIENT)
Dept: INTERNAL MEDICINE | Facility: CLINIC | Age: 70
End: 2025-08-25

## 2025-08-25 DIAGNOSIS — H90.3 SENSORINEURAL HEARING LOSS, BILATERAL: Primary | ICD-10-CM

## 2025-09-04 ENCOUNTER — TELEPHONE (OUTPATIENT)
Dept: AUDIOLOGY | Facility: CLINIC | Age: 70
End: 2025-09-04
Payer: COMMERCIAL

## (undated) DEVICE — LINEN TOWEL PACK X5 5464

## (undated) DEVICE — CEMENT PRESSURIZER FEMORAL CANAL MED 0206-546-000

## (undated) DEVICE — SOL NACL 0.9% INJ 1000ML BAG 07983-09

## (undated) DEVICE — STPL SKIN 35W 6.9MM  PXW35

## (undated) DEVICE — HOOD SURG T7PLUS PEEL AWAY FACE SHIELD STRL LF 0416-801-100

## (undated) DEVICE — DRSG AQUACEL AG HYDROFIBER  3.5X10" 422605

## (undated) DEVICE — SUCTION IRR SYSTEM W/O TIP INTERPULSE HANDPIECE 0210-100-000

## (undated) DEVICE — BONE CLEANING TIP INTERPULSE FEMORAL CANAL 0210-008-000

## (undated) DEVICE — BONE CEMENT MIXEVAC HI VAC W/CARTRIDGE 0306-563-000

## (undated) DEVICE — BONE CEMENT BIOPREP FEMORAL PREP PLUG INSERTER 0206-710-000

## (undated) DEVICE — BONE CLEANING TIP INTERPULSE  0210-010-000

## (undated) DEVICE — BLADE SAW SAGITTAL STRK 18X90X1.37MM HD SYS 6 6118-137-090

## (undated) DEVICE — DRAPE CONVERTORS U-DRAPE 60X72" 8476

## (undated) DEVICE — DEVICE RETRIEVER HEWSON 71111579

## (undated) DEVICE — DRAPE SHEET REV FOLD 3/4 9349

## (undated) DEVICE — NDL SPINAL 18GA 3.5" 405184

## (undated) DEVICE — PACK TOTAL HIP W/POUCH SOP15HPFSM

## (undated) DEVICE — SU VICRYL+ 1 MO-4 18" DYED VCP702D

## (undated) DEVICE — SU ETHIBOND 5 V-37 4X30" MB66G

## (undated) DEVICE — SU VICRYL 2-0 CT-1 27" UND J259H

## (undated) DEVICE — SOLUTION WOUND CLEANSING 3/4OZ 10% PVP EA-L3011FB-50

## (undated) DEVICE — LINEN DRAPE 54X72" 5467

## (undated) DEVICE — SOL WATER IRRIG 1000ML BOTTLE 2F7114

## (undated) RX ORDER — VANCOMYCIN HYDROCHLORIDE 1 G/20ML
INJECTION, POWDER, LYOPHILIZED, FOR SOLUTION INTRAVENOUS
Status: DISPENSED
Start: 2024-12-16

## (undated) RX ORDER — CEFAZOLIN SODIUM/WATER 2 G/20 ML
SYRINGE (ML) INTRAVENOUS
Status: DISPENSED
Start: 2024-12-16

## (undated) RX ORDER — TOBRAMYCIN 1.2 G/30ML
INJECTION, POWDER, LYOPHILIZED, FOR SOLUTION INTRAVENOUS
Status: DISPENSED
Start: 2024-12-16

## (undated) RX ORDER — LIDOCAINE HYDROCHLORIDE 20 MG/ML
JELLY TOPICAL
Status: DISPENSED
Start: 2024-12-16

## (undated) RX ORDER — FENTANYL CITRATE 50 UG/ML
INJECTION, SOLUTION INTRAMUSCULAR; INTRAVENOUS
Status: DISPENSED
Start: 2024-12-16

## (undated) RX ORDER — FENTANYL CITRATE 0.05 MG/ML
INJECTION, SOLUTION INTRAMUSCULAR; INTRAVENOUS
Status: DISPENSED
Start: 2024-12-16

## (undated) RX ORDER — HYDROMORPHONE HYDROCHLORIDE 1 MG/ML
INJECTION, SOLUTION INTRAMUSCULAR; INTRAVENOUS; SUBCUTANEOUS
Status: DISPENSED
Start: 2024-12-16